# Patient Record
Sex: FEMALE | Race: WHITE | Employment: OTHER | URBAN - METROPOLITAN AREA
[De-identification: names, ages, dates, MRNs, and addresses within clinical notes are randomized per-mention and may not be internally consistent; named-entity substitution may affect disease eponyms.]

---

## 2017-06-08 ENCOUNTER — HOSPITAL ENCOUNTER (OUTPATIENT)
Dept: NON INVASIVE DIAGNOSTICS | Age: 77
Discharge: HOME | End: 2017-06-09
Attending: INTERNAL MEDICINE | Admitting: INTERNAL MEDICINE
Payer: MEDICARE

## 2017-06-08 ENCOUNTER — APPOINTMENT (OUTPATIENT)
Dept: GENERAL RADIOLOGY | Age: 77
End: 2017-06-08
Attending: INTERNAL MEDICINE
Payer: MEDICARE

## 2017-06-08 PROCEDURE — 77030031139 HC SUT VCRL2 J&J -A

## 2017-06-08 PROCEDURE — L3670 SO ACRO/CLAV CAN WEB PRE OTS: HCPCS

## 2017-06-08 PROCEDURE — 77030011640 HC PAD GRND REM COVD -A

## 2017-06-08 PROCEDURE — 77030002996 HC SUT SLK J&J -A

## 2017-06-08 PROCEDURE — 33208 INSRT HEART PM ATRIAL & VENT: CPT

## 2017-06-08 PROCEDURE — 74011000250 HC RX REV CODE- 250

## 2017-06-08 PROCEDURE — 74011250637 HC RX REV CODE- 250/637: Performed by: INTERNAL MEDICINE

## 2017-06-08 PROCEDURE — 77030018729 HC ELECTRD DEFIB PAD CARD -B

## 2017-06-08 PROCEDURE — 71010 XR CHEST PORT: CPT

## 2017-06-08 PROCEDURE — 99153 MOD SED SAME PHYS/QHP EA: CPT

## 2017-06-08 PROCEDURE — C1894 INTRO/SHEATH, NON-LASER: HCPCS

## 2017-06-08 PROCEDURE — 77030010507 HC ADH SKN DERMBND J&J -B

## 2017-06-08 PROCEDURE — 74011250636 HC RX REV CODE- 250/636

## 2017-06-08 PROCEDURE — 77030018836 HC SOL IRR NACL ICUM -A

## 2017-06-08 PROCEDURE — 74011250636 HC RX REV CODE- 250/636: Performed by: INTERNAL MEDICINE

## 2017-06-08 PROCEDURE — C1898 LEAD, PMKR, OTHER THAN TRANS: HCPCS

## 2017-06-08 PROCEDURE — C1892 INTRO/SHEATH,FIXED,PEEL-AWAY: HCPCS

## 2017-06-08 PROCEDURE — C1785 PMKR, DUAL, RATE-RESP: HCPCS

## 2017-06-08 PROCEDURE — 99152 MOD SED SAME PHYS/QHP 5/>YRS: CPT

## 2017-06-08 RX ORDER — ONDANSETRON 2 MG/ML
4 INJECTION INTRAMUSCULAR; INTRAVENOUS
Status: DISCONTINUED | OUTPATIENT
Start: 2017-06-08 | End: 2017-06-09 | Stop reason: HOSPADM

## 2017-06-08 RX ORDER — CEFAZOLIN SODIUM 1 G/3ML
INJECTION, POWDER, FOR SOLUTION INTRAMUSCULAR; INTRAVENOUS
Status: DISCONTINUED
Start: 2017-06-08 | End: 2017-06-09 | Stop reason: HOSPADM

## 2017-06-08 RX ORDER — TRAMADOL HYDROCHLORIDE 50 MG/1
50 TABLET ORAL
Status: DISCONTINUED | OUTPATIENT
Start: 2017-06-08 | End: 2017-06-09 | Stop reason: HOSPADM

## 2017-06-08 RX ORDER — LIDOCAINE HYDROCHLORIDE AND EPINEPHRINE 10; 10 MG/ML; UG/ML
INJECTION, SOLUTION INFILTRATION; PERINEURAL
Status: DISCONTINUED
Start: 2017-06-08 | End: 2017-06-08

## 2017-06-08 RX ORDER — SODIUM CHLORIDE 0.9 % (FLUSH) 0.9 %
5-10 SYRINGE (ML) INJECTION AS NEEDED
Status: DISCONTINUED | OUTPATIENT
Start: 2017-06-08 | End: 2017-06-09 | Stop reason: HOSPADM

## 2017-06-08 RX ORDER — METOPROLOL TARTRATE 5 MG/5ML
INJECTION INTRAVENOUS
Status: COMPLETED
Start: 2017-06-08 | End: 2017-06-08

## 2017-06-08 RX ORDER — PANTOPRAZOLE SODIUM 40 MG/1
40 TABLET, DELAYED RELEASE ORAL DAILY
Status: DISCONTINUED | OUTPATIENT
Start: 2017-06-09 | End: 2017-06-09 | Stop reason: HOSPADM

## 2017-06-08 RX ORDER — BISMUTH SUBSALICYLATE 262 MG
1 TABLET,CHEWABLE ORAL DAILY
COMMUNITY
End: 2022-02-22

## 2017-06-08 RX ORDER — CEFAZOLIN SODIUM IN 0.9 % NACL 2 G/100 ML
2 PLASTIC BAG, INJECTION (ML) INTRAVENOUS ONCE
Status: COMPLETED | OUTPATIENT
Start: 2017-06-08 | End: 2017-06-08

## 2017-06-08 RX ORDER — HEPARIN SODIUM 200 [USP'U]/100ML
INJECTION, SOLUTION INTRAVENOUS
Status: COMPLETED
Start: 2017-06-08 | End: 2017-06-08

## 2017-06-08 RX ORDER — LIDOCAINE HYDROCHLORIDE 10 MG/ML
1-40 INJECTION INFILTRATION; PERINEURAL
Status: DISCONTINUED | OUTPATIENT
Start: 2017-06-08 | End: 2017-06-08 | Stop reason: HOSPADM

## 2017-06-08 RX ORDER — FENTANYL CITRATE 50 UG/ML
INJECTION, SOLUTION INTRAMUSCULAR; INTRAVENOUS
Status: COMPLETED
Start: 2017-06-08 | End: 2017-06-08

## 2017-06-08 RX ORDER — ONDANSETRON 2 MG/ML
4 INJECTION INTRAMUSCULAR; INTRAVENOUS
Status: COMPLETED | OUTPATIENT
Start: 2017-06-08 | End: 2017-06-08

## 2017-06-08 RX ORDER — ONDANSETRON 2 MG/ML
INJECTION INTRAMUSCULAR; INTRAVENOUS
Status: COMPLETED
Start: 2017-06-08 | End: 2017-06-08

## 2017-06-08 RX ORDER — SODIUM CHLORIDE 0.9 % (FLUSH) 0.9 %
5-10 SYRINGE (ML) INJECTION EVERY 8 HOURS
Status: DISCONTINUED | OUTPATIENT
Start: 2017-06-08 | End: 2017-06-09 | Stop reason: HOSPADM

## 2017-06-08 RX ORDER — METOPROLOL TARTRATE 5 MG/5ML
5 INJECTION INTRAVENOUS ONCE
Status: COMPLETED | OUTPATIENT
Start: 2017-06-08 | End: 2017-06-08

## 2017-06-08 RX ORDER — MIDAZOLAM HYDROCHLORIDE 1 MG/ML
1-5 INJECTION, SOLUTION INTRAMUSCULAR; INTRAVENOUS
Status: DISCONTINUED | OUTPATIENT
Start: 2017-06-08 | End: 2017-06-08 | Stop reason: HOSPADM

## 2017-06-08 RX ORDER — MIDAZOLAM HYDROCHLORIDE 1 MG/ML
INJECTION, SOLUTION INTRAMUSCULAR; INTRAVENOUS
Status: COMPLETED
Start: 2017-06-08 | End: 2017-06-08

## 2017-06-08 RX ORDER — ENALAPRIL MALEATE 5 MG/1
10 TABLET ORAL DAILY
Status: DISCONTINUED | OUTPATIENT
Start: 2017-06-08 | End: 2017-06-09 | Stop reason: HOSPADM

## 2017-06-08 RX ORDER — BACITRACIN 50000 [IU]/1
INJECTION, POWDER, FOR SOLUTION INTRAMUSCULAR
Status: COMPLETED
Start: 2017-06-08 | End: 2017-06-08

## 2017-06-08 RX ORDER — HEPARIN SODIUM 200 [USP'U]/100ML
500 INJECTION, SOLUTION INTRAVENOUS ONCE
Status: COMPLETED | OUTPATIENT
Start: 2017-06-08 | End: 2017-06-08

## 2017-06-08 RX ORDER — LIDOCAINE HYDROCHLORIDE AND EPINEPHRINE 10; 10 MG/ML; UG/ML
INJECTION, SOLUTION INFILTRATION; PERINEURAL
Status: COMPLETED
Start: 2017-06-08 | End: 2017-06-08

## 2017-06-08 RX ORDER — BACITRACIN 50000 [IU]/1
50000 INJECTION, POWDER, FOR SOLUTION INTRAMUSCULAR ONCE
Status: COMPLETED | OUTPATIENT
Start: 2017-06-08 | End: 2017-06-08

## 2017-06-08 RX ORDER — ACETAMINOPHEN 325 MG/1
650 TABLET ORAL
Status: DISCONTINUED | OUTPATIENT
Start: 2017-06-08 | End: 2017-06-09 | Stop reason: HOSPADM

## 2017-06-08 RX ORDER — TRAMADOL HYDROCHLORIDE 50 MG/1
50 TABLET ORAL
Qty: 10 TAB | Refills: 0 | Status: ON HOLD | OUTPATIENT
Start: 2017-06-08 | End: 2017-07-01

## 2017-06-08 RX ORDER — FENTANYL CITRATE 50 UG/ML
12.5-5 INJECTION, SOLUTION INTRAMUSCULAR; INTRAVENOUS
Status: DISCONTINUED | OUTPATIENT
Start: 2017-06-08 | End: 2017-06-08 | Stop reason: HOSPADM

## 2017-06-08 RX ORDER — CEFAZOLIN SODIUM IN 0.9 % NACL 2 G/100 ML
2 PLASTIC BAG, INJECTION (ML) INTRAVENOUS EVERY 8 HOURS
Status: COMPLETED | OUTPATIENT
Start: 2017-06-08 | End: 2017-06-09

## 2017-06-08 RX ADMIN — BACITRACIN 50000 UNITS: 5000 INJECTION, POWDER, FOR SOLUTION INTRAMUSCULAR at 14:58

## 2017-06-08 RX ADMIN — TRAMADOL HYDROCHLORIDE 50 MG: 50 TABLET, FILM COATED ORAL at 21:51

## 2017-06-08 RX ADMIN — CEFAZOLIN 2 G: 10 INJECTION, POWDER, FOR SOLUTION INTRAVENOUS; PARENTERAL at 21:34

## 2017-06-08 RX ADMIN — FENTANYL CITRATE 25 MCG: 50 INJECTION, SOLUTION INTRAMUSCULAR; INTRAVENOUS at 14:58

## 2017-06-08 RX ADMIN — FENTANYL CITRATE 50 MCG: 50 INJECTION, SOLUTION INTRAMUSCULAR; INTRAVENOUS at 14:16

## 2017-06-08 RX ADMIN — ENALAPRIL MALEATE 10 MG: 5 TABLET ORAL at 18:14

## 2017-06-08 RX ADMIN — TRAMADOL HYDROCHLORIDE 50 MG: 50 TABLET, FILM COATED ORAL at 17:13

## 2017-06-08 RX ADMIN — MIDAZOLAM HYDROCHLORIDE 2 MG: 1 INJECTION, SOLUTION INTRAMUSCULAR; INTRAVENOUS at 14:16

## 2017-06-08 RX ADMIN — MIDAZOLAM HYDROCHLORIDE 2 MG: 1 INJECTION INTRAMUSCULAR; INTRAVENOUS at 14:16

## 2017-06-08 RX ADMIN — FENTANYL CITRATE 25 MCG: 50 INJECTION, SOLUTION INTRAMUSCULAR; INTRAVENOUS at 14:46

## 2017-06-08 RX ADMIN — BACITRACIN 50000 UNITS: 50000 INJECTION, POWDER, FOR SOLUTION INTRAMUSCULAR at 14:58

## 2017-06-08 RX ADMIN — METOPROLOL TARTRATE 5 MG: 5 INJECTION INTRAVENOUS at 14:57

## 2017-06-08 RX ADMIN — CEFAZOLIN 2 G: 10 INJECTION, POWDER, FOR SOLUTION INTRAVENOUS; PARENTERAL at 14:15

## 2017-06-08 RX ADMIN — MIDAZOLAM HYDROCHLORIDE 2 MG: 1 INJECTION, SOLUTION INTRAMUSCULAR; INTRAVENOUS at 14:50

## 2017-06-08 RX ADMIN — HEPARIN SODIUM 1000 UNITS: 200 INJECTION, SOLUTION INTRAVENOUS at 14:15

## 2017-06-08 RX ADMIN — MIDAZOLAM HYDROCHLORIDE 2 MG: 1 INJECTION INTRAMUSCULAR; INTRAVENOUS at 14:45

## 2017-06-08 RX ADMIN — ONDANSETRON HYDROCHLORIDE 4 MG: 2 INJECTION, SOLUTION INTRAMUSCULAR; INTRAVENOUS at 14:21

## 2017-06-08 RX ADMIN — MIDAZOLAM HYDROCHLORIDE 2 MG: 1 INJECTION, SOLUTION INTRAMUSCULAR; INTRAVENOUS at 14:45

## 2017-06-08 RX ADMIN — MIDAZOLAM HYDROCHLORIDE 2 MG: 1 INJECTION, SOLUTION INTRAMUSCULAR; INTRAVENOUS at 14:33

## 2017-06-08 RX ADMIN — ONDANSETRON 4 MG: 2 INJECTION INTRAMUSCULAR; INTRAVENOUS at 14:21

## 2017-06-08 RX ADMIN — LIDOCAINE HYDROCHLORIDE AND EPINEPHRINE 20 ML: 10; 10 INJECTION, SOLUTION INFILTRATION; PERINEURAL at 14:46

## 2017-06-08 RX ADMIN — ACETAMINOPHEN 650 MG: 325 TABLET, FILM COATED ORAL at 21:51

## 2017-06-08 RX ADMIN — Medication 10 ML: at 21:34

## 2017-06-08 NOTE — PROCEDURES
[address]  [phone]    Patient ID:  Patient: Samantha Hay  MRN: [id number]  Age: 78 y.o.  : 1940  Gender: female  Study Date: 2017    History: This is a female with nonreversible sick sinus syndrome and 2nd degree AV block, here for permanent pacemaker implant. Procedures Performed:  1. DUAL CHAMBER PACEMAKER (81642)    The patient was brought to the EP lab in a postabsorptive state after informed consent had been previously obtained. Continuous electrocardiographic and hemodynamic monitoring was performed. Sedation was performed by the nurse who was in constant attendance and my supervision throughout the procedure. Versed and fentanyl were used, start time 1:53 pm, stop time 2:53 pm.  Using 1% lidocaine with epinephrine, the left chest site was anesthetized. The pocket was formed in the usual fashion and ultimately axillary venous access was obtained using a micropuncture needle. Two safe sheaths were placed. The right ventricular lead was advanced to the RV apex. The right atrial lead was advanced to the RA appendage. Each lead was fixed and tested, performance verified. The leads were anchored to the pocket floor using two 0-silk sutures at each anchor sleeve. The pulse generator was connected to the leads and placed in the pocket after hemostasis was confirmed. Vigorous irrigation with antibiotic solution was performed. A single 0-silk suture was used to anchor the pulse generator to the pocket floor. The pocket was closed using a running 2-0 Vicryl layer x1, followed by a more superficial layer of running 4-0 Vicryl in a subcuticular fashion to close the skin. Final fluoroscopic check revealed adequate redundancy of the leads and the absence of pneumothorax. Dermabond was applied. Preoperative Diagnosis: As above. Postoperative Diagnosis: As above. Procedure:  As above.   Surgeon(s) and Role: Marina Matute MD - Primary   Anesthesia:   MAC. Estimated Blood Loss:  <5 cc. Specimens: * No specimens in log *   Findings:  As below. Complications:  None. SETTINGS:  SJM 2272, [id number], [employer] MRI  RA 3.9, 1.1, 564  RV 8.3, 0.5, 636  DDDR     Recommendations:  After successful dual chamber permanent pacemaker implant to the left chest using transvenous leads, routine follow-up in 2-4 weeks for wound and device check.

## 2017-06-08 NOTE — H&P
[facility]   [place], [address]   HISTORY AND PHYSICAL       Name:  Roslyn Mendieta   MR#:  [id number]   :  1940   Account #:  [de-identified]        Date of Adm:  2017       CHIEF COMPLAINT: Symptomatic bradycardia. HISTORY OF PRESENT ILLNESS: This is a 70-year-old female with a   history of prior supraventricular tachycardia ablation in  by Dr. Harman Cruz, chronic propranolol for symptomatic PVCs, hypertension,   hyperlipidemia, and hypothyroidism. She has been quite been complaining of occasional palpitations which   she describes as skipped beats, but no rapid heart rates. Lately she   has had more fatigue, including having to take afternoon naps, which   she did not do before. She tries to remain active and has cut back on   her propranolol to twice from three times daily. She has had a history of vertigo like   symptoms, but none recently. No syncope, chest pain, or heart failure symptoms otherwise. ALLERGIES:   1. EPINEPHRINE. 2. MOLD EXTRACT. 3. MORPHINE. MEDICATIONS: Please see the list.    REVIEW OF SYSTEMS   Noncontributory except as noted above. FAMILY HISTORY: Noncontributory. SOCIAL HISTORY: Nonsmoker. . Occasional alcohol use. PHYSICAL EXAMINATION   VITAL SIGNS: Blood pressure 199/83, pulse 63, respirations 18,   oxygen saturation 100% on room air, weight 138 pounds (62.6   kilograms). GENERAL: Not diaphoretic, not in acute distress. CARDIAC: Irregular rhythm and bradycardic, no murmur. Palpable   radial pulses bilaterally. EXTREMITIES: No cyanosis or clubbing. NEUROLOGIC: Awake, appropriate, grossly nonfocal. No resting   tremor. TELEMETRY: Sinus rhythm with sinus slowing and 2nd degree AV block Mobitz I are noted. The   overall heart rate is approximately 40 beats per minute, probably less. This is at rest.    IMPRESSION:   1.  Sick sinus syndrome and 2nd degree AV block with bradycardia, out of proportion to current beta blocker use. She is symptomatic. This is a nonreversible condition. 2. As above. RECOMMENDATIONS:   1. Given the potential benefits, risks, and alternatives she agrees to proceed with permanent pacemaker. A dual-chamber device will be   used. MRI compatibility will be sought. MD SHIRA Snyder / Denise.Wali   D:  06/08/2017   15:14   T:  06/08/2017   15:37   Job #:  [id number]

## 2017-06-08 NOTE — IP AVS SNAPSHOT
[address] [facility] 
[phone] Patient: Kaveh Falcon MRN: [id number] JFQ:0/49/2548 You are allergic to the following Allergen Reactions Epinephrine Other (comments) \"severe tachycardia\" Mold Extracts Unknown (comments) Morphine Nausea and Vomiting Recent Documentation Height Weight Breastfeeding? BMI OB Status Smoking Status 1.575 m 62.6 kg No 25.24 kg/m2 Hysterectomy Never Smoker Emergency Contacts Name Discharge Info Relation Home Work Mobile [address] [employer] CAREGIVER [3] Spouse [3] [phone] About your hospitalization You were admitted on:  June 8, 2017 You last received care in the:  [facility] 2 [facility] You were discharged on:  June 9, 2017 Unit phone number:  [phone] Why you were hospitalized Your primary diagnosis was:  Not on File Providers Seen During Your Hospitalizations Provider Role Specialty Primary office phone Regi Beverly MD Attending Provider Cardiology [phone] Your Primary Care Physician (PCP) Primary Care Physician Office Phone Office Fax John Lopez [phone] Follow-up Information Follow up With Details Comments Contact Info Rk Lopez, [address] [facility] [address] 
[phone] Your Appointments Wednesday July 05, 2017 11:00 AM EDT  
[facility] SCREENING SD RESULTS with [facility] 3 [address] ([address]) 200 [facility], [address] 3988 [address] [place] [address]  
[phone] Shower or bathe using soap and water.   Do not use deodorant, powder, perfumes, or lotion the day of your exam.  If your prior mammograms were not performed at [facility] please bring films with you or forward prior images 2 days before your procedure. Check in at registration 15min before your appointment time unless you were instructed to do otherwise. A script is not necessary, but if you have one, please bring it on the day of the mammogram or have it faxed to the department. [facility] [phone] [facility] [place]  [phone] [facility] [address]  [phone] [facility] [phone] [facility] [address] [facility] Jem Kentin [phone] Patient should report to the [facility], located in [address] [place] [address], [address]. Patient should arrive 15 minutes prior to appointment time. Current Discharge Medication List  
  
START taking these medications Dose & Instructions Dispensing Information Comments Morning Noon Evening Bedtime  
 traMADol 50 mg tablet Commonly known as:  ULTRAM  
   
Your last dose was: Your next dose is:    
   
   
 Dose:  50 mg Take 1 Tab by mouth every six (6) hours as needed for Pain. Max Daily Amount: 200 mg. Quantity:  10 Tab Refills:  0 CONTINUE these medications which have NOT CHANGED Dose & Instructions Dispensing Information Comments Morning Noon Evening Bedtime  
 enalapril 10 mg tablet Commonly known as:  Tiny Spray Your last dose was: Your next dose is:    
   
   
 Dose:  10 mg Take 10 mg by mouth daily. Refills:  0  
     
   
   
   
  
 multivitamin tablet Commonly known as:  ONE A DAY Your last dose was: Your next dose is:    
   
   
 Dose:  1 Tab Take 1 Tab by mouth daily. Refills:  0  
     
   
   
   
  
 omeprazole 10 mg capsule Commonly known as:  PRILOSEC Your last dose was: Your next dose is:    
   
   
 Dose:  40 mg Take 40 mg by mouth daily. Refills:  0  
     
   
   
   
  
 SYNTHROID 100 mcg tablet Generic drug:  levothyroxine Your last dose was: Your next dose is:    
   
   
 Dose:  100 mcg Take 100 mcg by mouth Daily (before breakfast). DAILY EXCEPT SUNDAY Refills:  0 Where to Get Your Medications Information on where to get these meds will be given to you by the nurse or doctor. ! Ask your nurse or doctor about these medications  
  traMADol 50 mg tablet Discharge Instructions DISCHARGE INSTRUCTIONS FOR PATIENTS WITH PACEMAKERS You had a dual chamber [employer] pacemaker implanted for a symptomatic slow heart rate problem with Dr. Allan Henderson on 6/8. 
 
1. Remember to call for an appointment in 2-4 weeks [phone] to check healing and implant programming with Dr. Davis Kirkpatrick nurse, Billie Nageotte. 2. Medic Alert Bracelets are available from your pharmacist to wear at all times if you choose to wear one. 3. Carry your ID card for pacemaker with you at all times. This card will be given to you in the hospital or mailed to you. 4. The pacemaker will bulge slightly under your skin. The bulge will decrease in size over the next few weeks. Please notify the doctor's office if you notice any of the following around your site: A.  A bruise that does not go away. B.  Soreness or yellow, green, or brown drainage from the site. C. Any swelling from the site. D. If you have a fever of 100 degrees or higher that lasts for a few days. INCISION CARE 1.  Leave skin glue over your site until it starts to fall off, usually in a few weeks. 2.  You may shower after 3 days as long as your incision isnt submerged or directly sprayed upon until well healed. 3.  For comfort, wear loose fitting clothing. 4.  Report any signs of infection, fever, pain, swelling, redness, oozing, or heat at site especially if these symptoms increase after the first 3 to 4 days. ACTIVITY PRECAUTIONS 1. Avoid rough contact with the implant site. 2. No driving for 14 days.  
3. Avoid lifting your arm over your head, carrying anything on the affected side, or lifting over 10 pounds for 30 days. For the first 2 days only bend your arm at the elbow. 4. Any extreme activity such as golf, weight lifting or exercise biking should be restricted for 60 days. 5. Do not carry objects by holding them against your implant site. 6.  No shooting rifles or any type of gun with the affected shoulder permanently. SPECIAL PRECAUTIONS 1. You should avoid all strong magnetic fields, such as arc welding, large transformers, large motors. 2.  You may not have an MRI which uses a strong magnet to take pictures unless your system is confirmed to be working well and a radiologist gives the OK. 3.  Treatments or surgery that requires diathermy or electrocautery should be discussed with your doctor before scheduled. 4. Avoid radio frequency transmitters, including radar. 5. Advise dentist or other medical personnel you see that you have a pacemaker. 6.  Cell phones and microwave oven use is okay. 7.  If you plan to move or take a trip to a new area, the doctor's office will give you a name of a doctor to contact for any problems. ANTIBIOTIC THERAPY During the first 8 weeks after your pacemaker insertion, you may need antibiotics before any dental work or certain tests or operations. Let the dentist or doctor who is caring for you know that you have had an implanted device. Discharge Orders None Introducing [facility]! Grey Zapata introduces [employer] patient portal. Now you can access parts of your medical record, email your doctor's office, and request medication refills online. 1. In your internet browser, go to [email or web address] 2. Click on the First Time User? Click Here link in the Sign In box. You will see the New Member Sign Up page. 3. Enter your [employer] Access Code exactly as it appears below. You will not need to use this code after youve completed the sign-up process.  If you do not sign up before the expiration date, you must request a new code. · [email or web address] Access Code: [email or web address] Expires: 8/23/2017  2:48 PM 
 
4. Enter the last four digits of your Social Security Number (xxxx) and Date of Birth (mm/dd/yyyy) as indicated and click Submit. You will be taken to the next sign-up page. 5. Create a [email or web address] ID. This will be your [email or web address] login ID and cannot be changed, so think of one that is secure and easy to remember. 6. Create a [email or web address] password. You can change your password at any time. 7. Enter your Password Reset Question and Answer. This can be used at a later time if you forget your password. 8. Enter your e-mail address. You will receive e-mail notification when new information is available in [address] 9. Click Sign Up. You can now view and download portions of your medical record. 10. Click the Download Summary menu link to download a portable copy of your medical information. If you have questions, please visit the Frequently Asked Questions section of the [email or web address] website. Remember, [email or web address] is NOT to be used for urgent needs. For medical emergencies, dial 911. Now available from your iPhone and Android! General Information Please provide this summary of care documentation to your next provider. Patient Signature:  ____________________________________________________________ Date:  ____________________________________________________________  
  
Francy Eastman Provider Signature:  ____________________________________________________________ Date:  ____________________________________________________________

## 2017-06-09 VITALS
WEIGHT: 138 LBS | HEART RATE: 60 BPM | BODY MASS INDEX: 25.4 KG/M2 | SYSTOLIC BLOOD PRESSURE: 164 MMHG | TEMPERATURE: 97.5 F | HEIGHT: 62 IN | OXYGEN SATURATION: 97 % | DIASTOLIC BLOOD PRESSURE: 81 MMHG | RESPIRATION RATE: 18 BRPM

## 2017-06-09 PROCEDURE — 74011250636 HC RX REV CODE- 250/636: Performed by: INTERNAL MEDICINE

## 2017-06-09 PROCEDURE — 74011250637 HC RX REV CODE- 250/637: Performed by: INTERNAL MEDICINE

## 2017-06-09 RX ADMIN — ACETAMINOPHEN 650 MG: 325 TABLET, FILM COATED ORAL at 06:25

## 2017-06-09 RX ADMIN — LEVOTHYROXINE SODIUM 100 MCG: 75 TABLET ORAL at 08:26

## 2017-06-09 RX ADMIN — TRAMADOL HYDROCHLORIDE 50 MG: 50 TABLET, FILM COATED ORAL at 05:19

## 2017-06-09 RX ADMIN — CEFAZOLIN 2 G: 10 INJECTION, POWDER, FOR SOLUTION INTRAVENOUS; PARENTERAL at 05:19

## 2017-06-09 RX ADMIN — Medication 10 ML: at 05:21

## 2017-06-09 RX ADMIN — ACETAMINOPHEN 650 MG: 325 TABLET, FILM COATED ORAL at 02:38

## 2017-06-09 RX ADMIN — PANTOPRAZOLE SODIUM 40 MG: 40 TABLET, DELAYED RELEASE ORAL at 08:26

## 2017-06-09 RX ADMIN — MULTIPLE VITAMINS W/ MINERALS TAB 1 TABLET: TAB at 08:26

## 2017-06-09 RX ADMIN — ENALAPRIL MALEATE 10 MG: 5 TABLET ORAL at 08:26

## 2017-06-09 NOTE — PROGRESS NOTES
POD#1 site and device programming check OK. S/p dual chamber SJM pacemaker. Dermabond intact. No hematoma. Ambulatory and taking oral.      Visit Vitals    /81 (BP 1 Location: Right arm, BP Patient Position: At rest)    Pulse 60    Temp 97.5 °F (36.4 °C)    Resp 18    Ht 5' 2\" (1.575 m)    Wt 62.6 kg (138 lb)    SpO2 97%    Breastfeeding No    BMI 25.24 kg/m2       ND, NAD  Chest site OK. No hematoma. RRR, no rub. Lungs CTAB anteriorly. No unilateral arm edema. Awake, appropriate, neuro grossly nonfocal.      PLAN:  Discharge to home with F/U in the office for wound and device programming check. Tramadol PRN. All questions answered. Patient is aware of signs and sx warranting urgent med F/U or calling 911.

## 2017-06-09 NOTE — CARDIO/PULMONARY
Cardiac Rehab:      Chart reviewed and pt visited. Printed material given and discussed re: pacemakers, pacemaker discharge instructions and the TLC diet. Discussed post pacemaker instructions including: restrictions for the affected arm (no raising the arm above shoulder level, no heavy lifting for 30 days), monitoring for infection, avoiding impacts/pressure to the site, avoiding extreme activities, when to call the doctor, use of cell phones and microwaves and avoiding strong magnetic fields. Pt verbalized understanding.

## 2017-06-09 NOTE — PROGRESS NOTES
1900 - Bedside report from [facility]. LACW sore, just medicated by prior nurse. Sling and ice intact. Dermabond site benign. 2100 - OOB to restroom for HS care, sat on side of bed and wanted to return to bed. Pt reassured soreness at site is normal, rates pain a 2/10 but is very dramatic over incision, site, soreness, what clothes she can and cannot wear, etc.  Dtr constantly offering reassurance to pt. Pt declines ambulation in hallway at this time, wants to wait until morning but has moved around in restroom and room a fair amount thusfar this evening with no adverse effects. Pt is maintaining appropriate L arm precautions. 2200 - Tramadol / Tylenol for LACW soreness 2/10.    2300 - pt restful in bed with no current complaints. 0240 - Tylenol for HA and incisional ache. 0552 - Tramadol for HA and incisional ache. Ambulation in hallway 400 feet without difficulty or complaints. LACW incision is benign. 0630 - tylenol for HA and incisional soreness. 0700 - bedside report to RN.

## 2017-06-09 NOTE — PROGRESS NOTES
Patient ambulated in hallway without difficulty. Dressing CDI. No complaints. Discharge instructions reviewed with patient; to be discharged to home with . Site care instructions reviewed; site(s) CDI. Patient instructed on which medications to continue, which to start. Prescriptions given to patient. Medication info provided and reviewed with patient. Follow-up appointment information given; follow-up appointment to be made by patient. IV and tele box removed. Opportunity for questions provided; all questions answered. All belongings returned. Patient wheeled to front door via wheelchair by volunteer; to be transported home by .

## 2017-06-09 NOTE — DISCHARGE INSTRUCTIONS
DISCHARGE INSTRUCTIONS FOR PATIENTS WITH PACEMAKERS    You had a dual chamber [employer] pacemaker implanted for a symptomatic slow heart rate problem with Dr. Kartik Heck on 6/8.    1. Remember to call for an appointment in 2-4 weeks [phone] to check healing and implant programming with Dr. Cookie Mcintosh nurse, Evan Pressley. 2. Medic Alert Bracelets are available from your pharmacist to wear at all times if you choose to wear one. 3. Carry your ID card for pacemaker with you at all times. This card will be given to you in the hospital or mailed to you. 4. The pacemaker will bulge slightly under your skin. The bulge will decrease in size over the next few weeks. Please notify the doctor's office if you notice any of the following around your site:   A.  A bruise that does not go away. B.  Soreness or yellow, green, or brown drainage from the site. C. Any swelling from the site. D. If you have a fever of 100 degrees or higher that lasts for a few days. INCISION CARE       1.  Leave skin glue over your site until it starts to fall off, usually in a few weeks. 2.  You may shower after 3 days as long as your incision isnt submerged or directly sprayed upon until well healed. 3.  For comfort, wear loose fitting clothing. 4.  Report any signs of infection, fever, pain, swelling, redness, oozing, or heat at site especially if these symptoms increase after the first 3 to 4 days. ACTIVITY PRECAUTIONS     1. Avoid rough contact with the implant site. 2. No driving for 14 days. 3. Avoid lifting your arm over your head, carrying anything on the affected side, or lifting over 10 pounds for 30 days. For the first 2 days only bend your arm at the elbow. 4. Any extreme activity such as golf, weight lifting or exercise biking should be restricted for 60 days. 5. Do not carry objects by holding them against your implant site.    6.  No shooting rifles or any type of gun with the affected shoulder permanently. SPECIAL PRECAUTIONS     1. You should avoid all strong magnetic fields, such as arc welding, large transformers, large motors. 2.  You may not have an MRI which uses a strong magnet to take pictures unless your system is confirmed to be working well and a radiologist gives the OK. 3.  Treatments or surgery that requires diathermy or electrocautery should be discussed with your doctor before scheduled. 4. Avoid radio frequency transmitters, including radar. 5. Advise dentist or other medical personnel you see that you have a pacemaker. 6.  Cell phones and microwave oven use is okay. 7.  If you plan to move or take a trip to a new area, the doctor's office will give you a name of a doctor to contact for any problems. ANTIBIOTIC THERAPY    During the first 8 weeks after your pacemaker insertion, you may need antibiotics before any dental work or certain tests or operations. Let the dentist or doctor who is caring for you know that you have had an implanted device.

## 2017-06-29 ENCOUNTER — HOSPITAL ENCOUNTER (OUTPATIENT)
Dept: GENERAL RADIOLOGY | Age: 77
Discharge: HOME | End: 2017-06-29
Payer: MEDICARE

## 2017-06-29 DIAGNOSIS — I44.1: ICD-10-CM

## 2017-06-29 PROCEDURE — 71020 XR CHEST PA LAT: CPT

## 2017-06-30 ENCOUNTER — HOSPITAL ENCOUNTER (OUTPATIENT)
Dept: NON INVASIVE DIAGNOSTICS | Age: 77
Discharge: HOME | End: 2017-07-01
Attending: INTERNAL MEDICINE | Admitting: INTERNAL MEDICINE
Payer: MEDICARE

## 2017-06-30 ENCOUNTER — APPOINTMENT (OUTPATIENT)
Dept: GENERAL RADIOLOGY | Age: 77
End: 2017-06-30
Attending: INTERNAL MEDICINE
Payer: MEDICARE

## 2017-06-30 PROBLEM — T82.110A PACEMAKER LEAD MALFUNCTION: Status: ACTIVE | Noted: 2017-06-30

## 2017-06-30 PROCEDURE — 71010 XR CHEST PORT: CPT

## 2017-06-30 PROCEDURE — 99152 MOD SED SAME PHYS/QHP 5/>YRS: CPT

## 2017-06-30 PROCEDURE — 77030036615 HC IMPL ENV ANTIBACT MEDT -G

## 2017-06-30 PROCEDURE — 74011000250 HC RX REV CODE- 250

## 2017-06-30 PROCEDURE — 74011250636 HC RX REV CODE- 250/636

## 2017-06-30 PROCEDURE — 74011250636 HC RX REV CODE- 250/636: Performed by: INTERNAL MEDICINE

## 2017-06-30 PROCEDURE — 99218 HC RM OBSERVATION: CPT

## 2017-06-30 PROCEDURE — L3670 SO ACRO/CLAV CAN WEB PRE OTS: HCPCS

## 2017-06-30 PROCEDURE — 77030010507 HC ADH SKN DERMBND J&J -B

## 2017-06-30 PROCEDURE — 77030031139 HC SUT VCRL2 J&J -A

## 2017-06-30 PROCEDURE — 77030018729 HC ELECTRD DEFIB PAD CARD -B

## 2017-06-30 PROCEDURE — 74011250637 HC RX REV CODE- 250/637: Performed by: INTERNAL MEDICINE

## 2017-06-30 PROCEDURE — 77030011640 HC PAD GRND REM COVD -A

## 2017-06-30 PROCEDURE — 33215 REPOSITION PACING-DEFIB LEAD: CPT

## 2017-06-30 PROCEDURE — 77030028698 HC BLD TISS PLSM MEDT -D

## 2017-06-30 PROCEDURE — 99153 MOD SED SAME PHYS/QHP EA: CPT

## 2017-06-30 PROCEDURE — 77030002996 HC SUT SLK J&J -A

## 2017-06-30 PROCEDURE — 77030018836 HC SOL IRR NACL ICUM -A

## 2017-06-30 RX ORDER — SODIUM CHLORIDE 0.9 % (FLUSH) 0.9 %
5-10 SYRINGE (ML) INJECTION AS NEEDED
Status: DISCONTINUED | OUTPATIENT
Start: 2017-06-30 | End: 2017-07-01 | Stop reason: HOSPADM

## 2017-06-30 RX ORDER — MIDAZOLAM HYDROCHLORIDE 1 MG/ML
1-5 INJECTION, SOLUTION INTRAMUSCULAR; INTRAVENOUS
Status: DISCONTINUED | OUTPATIENT
Start: 2017-06-30 | End: 2017-06-30 | Stop reason: HOSPADM

## 2017-06-30 RX ORDER — THERA TABS 400 MCG
1 TAB ORAL DAILY
Status: DISCONTINUED | OUTPATIENT
Start: 2017-07-01 | End: 2017-07-01 | Stop reason: HOSPADM

## 2017-06-30 RX ORDER — METOPROLOL TARTRATE 5 MG/5ML
5 INJECTION INTRAVENOUS ONCE
Status: COMPLETED | OUTPATIENT
Start: 2017-06-30 | End: 2017-06-30

## 2017-06-30 RX ORDER — FENTANYL CITRATE 50 UG/ML
INJECTION, SOLUTION INTRAMUSCULAR; INTRAVENOUS
Status: COMPLETED
Start: 2017-06-30 | End: 2017-06-30

## 2017-06-30 RX ORDER — CEFAZOLIN SODIUM IN 0.9 % NACL 2 G/100 ML
PLASTIC BAG, INJECTION (ML) INTRAVENOUS
Status: COMPLETED
Start: 2017-06-30 | End: 2017-06-30

## 2017-06-30 RX ORDER — ONDANSETRON 2 MG/ML
INJECTION INTRAMUSCULAR; INTRAVENOUS
Status: DISPENSED
Start: 2017-06-30 | End: 2017-07-01

## 2017-06-30 RX ORDER — LABETALOL HYDROCHLORIDE 5 MG/ML
10 INJECTION, SOLUTION INTRAVENOUS
Status: DISCONTINUED | OUTPATIENT
Start: 2017-06-30 | End: 2017-07-01 | Stop reason: HOSPADM

## 2017-06-30 RX ORDER — MIDAZOLAM HYDROCHLORIDE 1 MG/ML
INJECTION, SOLUTION INTRAMUSCULAR; INTRAVENOUS
Status: COMPLETED
Start: 2017-06-30 | End: 2017-06-30

## 2017-06-30 RX ORDER — CEFAZOLIN SODIUM IN 0.9 % NACL 2 G/100 ML
2 PLASTIC BAG, INJECTION (ML) INTRAVENOUS EVERY 8 HOURS
Status: CANCELLED | OUTPATIENT
Start: 2017-06-30 | End: 2017-07-01

## 2017-06-30 RX ORDER — LIDOCAINE HYDROCHLORIDE 10 MG/ML
INJECTION INFILTRATION; PERINEURAL
Status: COMPLETED
Start: 2017-06-30 | End: 2017-06-30

## 2017-06-30 RX ORDER — ENALAPRIL MALEATE 5 MG/1
10 TABLET ORAL DAILY
Status: DISCONTINUED | OUTPATIENT
Start: 2017-06-30 | End: 2017-07-01 | Stop reason: HOSPADM

## 2017-06-30 RX ORDER — SODIUM CHLORIDE 0.9 % (FLUSH) 0.9 %
5-10 SYRINGE (ML) INJECTION EVERY 8 HOURS
Status: DISCONTINUED | OUTPATIENT
Start: 2017-06-30 | End: 2017-07-01 | Stop reason: HOSPADM

## 2017-06-30 RX ORDER — TRAMADOL HYDROCHLORIDE 50 MG/1
50-100 TABLET ORAL
Qty: 20 TAB | Refills: 0 | Status: ON HOLD | OUTPATIENT
Start: 2017-06-30 | End: 2017-08-13 | Stop reason: CLARIF

## 2017-06-30 RX ORDER — ONDANSETRON 2 MG/ML
4 INJECTION INTRAMUSCULAR; INTRAVENOUS ONCE
Status: ACTIVE | OUTPATIENT
Start: 2017-06-30 | End: 2017-07-01

## 2017-06-30 RX ORDER — CEFAZOLIN SODIUM IN 0.9 % NACL 2 G/100 ML
2 PLASTIC BAG, INJECTION (ML) INTRAVENOUS ONCE
Status: COMPLETED | OUTPATIENT
Start: 2017-06-30 | End: 2017-06-30

## 2017-06-30 RX ORDER — HEPARIN SODIUM 200 [USP'U]/100ML
500 INJECTION, SOLUTION INTRAVENOUS ONCE
Status: COMPLETED | OUTPATIENT
Start: 2017-06-30 | End: 2017-06-30

## 2017-06-30 RX ORDER — PANTOPRAZOLE SODIUM 40 MG/1
40 TABLET, DELAYED RELEASE ORAL DAILY
Status: DISCONTINUED | OUTPATIENT
Start: 2017-07-01 | End: 2017-07-01 | Stop reason: HOSPADM

## 2017-06-30 RX ORDER — SODIUM CHLORIDE 9 MG/ML
100 INJECTION, SOLUTION INTRAVENOUS CONTINUOUS
Status: DISCONTINUED | OUTPATIENT
Start: 2017-06-30 | End: 2017-07-01 | Stop reason: HOSPADM

## 2017-06-30 RX ORDER — BACITRACIN 50000 [IU]/1
50000 INJECTION, POWDER, FOR SOLUTION INTRAMUSCULAR ONCE
Status: COMPLETED | OUTPATIENT
Start: 2017-06-30 | End: 2017-06-30

## 2017-06-30 RX ORDER — HEPARIN SODIUM 200 [USP'U]/100ML
INJECTION, SOLUTION INTRAVENOUS
Status: COMPLETED
Start: 2017-06-30 | End: 2017-06-30

## 2017-06-30 RX ORDER — BACITRACIN 50000 [IU]/1
INJECTION, POWDER, FOR SOLUTION INTRAMUSCULAR
Status: COMPLETED
Start: 2017-06-30 | End: 2017-06-30

## 2017-06-30 RX ORDER — METOPROLOL TARTRATE 5 MG/5ML
INJECTION INTRAVENOUS
Status: COMPLETED
Start: 2017-06-30 | End: 2017-06-30

## 2017-06-30 RX ORDER — TRAMADOL HYDROCHLORIDE 50 MG/1
50-100 TABLET ORAL
Status: DISCONTINUED | OUTPATIENT
Start: 2017-06-30 | End: 2017-07-01 | Stop reason: HOSPADM

## 2017-06-30 RX ORDER — LIDOCAINE HYDROCHLORIDE 10 MG/ML
1-20 INJECTION INFILTRATION; PERINEURAL
Status: DISCONTINUED | OUTPATIENT
Start: 2017-06-30 | End: 2017-06-30 | Stop reason: HOSPADM

## 2017-06-30 RX ORDER — LORAZEPAM 0.5 MG/1
0.5 TABLET ORAL
Status: DISCONTINUED | OUTPATIENT
Start: 2017-06-30 | End: 2017-07-01 | Stop reason: HOSPADM

## 2017-06-30 RX ORDER — FENTANYL CITRATE 50 UG/ML
12.5-5 INJECTION, SOLUTION INTRAMUSCULAR; INTRAVENOUS
Status: DISCONTINUED | OUTPATIENT
Start: 2017-06-30 | End: 2017-06-30 | Stop reason: HOSPADM

## 2017-06-30 RX ADMIN — METOPROLOL TARTRATE 5 MG: 5 INJECTION INTRAVENOUS at 17:02

## 2017-06-30 RX ADMIN — CEFAZOLIN 2 G: 10 INJECTION, POWDER, FOR SOLUTION INTRAVENOUS; PARENTERAL at 15:47

## 2017-06-30 RX ADMIN — BACITRACIN 50000 UNITS: 50000 INJECTION, POWDER, FOR SOLUTION INTRAMUSCULAR at 16:43

## 2017-06-30 RX ADMIN — Medication 2 G: at 15:47

## 2017-06-30 RX ADMIN — MIDAZOLAM HYDROCHLORIDE 2 MG: 1 INJECTION INTRAMUSCULAR; INTRAVENOUS at 15:46

## 2017-06-30 RX ADMIN — FENTANYL CITRATE 50 MCG: 50 INJECTION, SOLUTION INTRAMUSCULAR; INTRAVENOUS at 16:20

## 2017-06-30 RX ADMIN — FENTANYL CITRATE 50 MCG: 50 INJECTION, SOLUTION INTRAMUSCULAR; INTRAVENOUS at 15:46

## 2017-06-30 RX ADMIN — MIDAZOLAM HYDROCHLORIDE 2 MG: 1 INJECTION, SOLUTION INTRAMUSCULAR; INTRAVENOUS at 16:14

## 2017-06-30 RX ADMIN — MIDAZOLAM HYDROCHLORIDE 1 MG: 1 INJECTION, SOLUTION INTRAMUSCULAR; INTRAVENOUS at 16:20

## 2017-06-30 RX ADMIN — HEPARIN SODIUM 1000 UNITS: 200 INJECTION, SOLUTION INTRAVENOUS at 15:49

## 2017-06-30 RX ADMIN — SODIUM CHLORIDE 100 ML/HR: 900 INJECTION, SOLUTION INTRAVENOUS at 12:25

## 2017-06-30 RX ADMIN — MIDAZOLAM HYDROCHLORIDE 2 MG: 1 INJECTION, SOLUTION INTRAMUSCULAR; INTRAVENOUS at 15:55

## 2017-06-30 RX ADMIN — TRAMADOL HYDROCHLORIDE 100 MG: 50 TABLET, FILM COATED ORAL at 23:18

## 2017-06-30 RX ADMIN — Medication 10 ML: at 23:18

## 2017-06-30 RX ADMIN — MIDAZOLAM HYDROCHLORIDE 2 MG: 1 INJECTION, SOLUTION INTRAMUSCULAR; INTRAVENOUS at 16:05

## 2017-06-30 RX ADMIN — MIDAZOLAM HYDROCHLORIDE 1 MG: 1 INJECTION, SOLUTION INTRAMUSCULAR; INTRAVENOUS at 16:38

## 2017-06-30 RX ADMIN — Medication 50 MCG: at 16:05

## 2017-06-30 RX ADMIN — LIDOCAINE HYDROCHLORIDE 15 ML: 10 INJECTION, SOLUTION INFILTRATION; PERINEURAL at 16:14

## 2017-06-30 RX ADMIN — FENTANYL CITRATE 50 MCG: 50 INJECTION, SOLUTION INTRAMUSCULAR; INTRAVENOUS at 16:05

## 2017-06-30 RX ADMIN — TRAMADOL HYDROCHLORIDE 50 MG: 50 TABLET, FILM COATED ORAL at 18:38

## 2017-06-30 RX ADMIN — LIDOCAINE HYDROCHLORIDE 15 ML: 10 INJECTION INFILTRATION; PERINEURAL at 16:14

## 2017-06-30 RX ADMIN — Medication 50 MCG: at 15:46

## 2017-06-30 RX ADMIN — MIDAZOLAM HYDROCHLORIDE 2 MG: 1 INJECTION, SOLUTION INTRAMUSCULAR; INTRAVENOUS at 15:46

## 2017-06-30 RX ADMIN — Medication 50 MCG: at 16:20

## 2017-06-30 RX ADMIN — ENALAPRIL MALEATE 10 MG: 5 TABLET ORAL at 18:38

## 2017-06-30 RX ADMIN — MIDAZOLAM HYDROCHLORIDE 2 MG: 1 INJECTION, SOLUTION INTRAMUSCULAR; INTRAVENOUS at 16:11

## 2017-06-30 NOTE — IP AVS SNAPSHOT
[address] [facility] 
[phone] Patient: Bebe Durant MRN: [id number] QDP:3/14/3851 You are allergic to the following Allergen Reactions Epinephrine Other (comments) \"severe tachycardia\" Mold Extracts Unknown (comments) Morphine Nausea and Vomiting Recent Documentation Height Weight BMI OB Status Smoking Status 1.575 m 61.7 kg 24.87 kg/m2 Hysterectomy Never Smoker Emergency Contacts Name Discharge Info Relation Home Work Mobile [address] [employer] CAREGIVER [3] Spouse [3] [phone] About your hospitalization You were admitted on:  June 30, 2017 You last received care in the:  [facility] 2 [facility] You were discharged on:  July 1, 2017 Unit phone number:  [phone] Why you were hospitalized Your primary diagnosis was:  Not on File Your diagnoses also included:  Pacemaker Lead Malfunction Providers Seen During Your Hospitalizations Provider Role Specialty Primary office phone Michelle Rodriguez MD Attending Provider Cardiology [phone] Your Primary Care Physician (PCP) Primary Care Physician Office Phone Office Fax Becca Zapata [phone] Follow-up Information Follow up With Details Comments Contact Info Deisy Duffy, [address] [facility] [address] [phone] Your Appointments Wednesday July 05, 2017 11:00 AM EDT  
[facility] SCREENING SD RESULTS with [facility] PSYCHIATRIC CENTER [facility] 3 [address] ([address]) 200 [facility], [address]48 [address] [place] [address]  
[phone] Shower or bathe using soap and water.   Do not use deodorant, powder, perfumes, or lotion the day of your exam.  If your prior mammograms were not performed at [facility] facility please bring films with you or forward prior images 2 days before your procedure. Check in at registration 15min before your appointment time unless you were instructed to do otherwise. A script is not necessary, but if you have one, please bring it on the day of the mammogram or have it faxed to the department. [facility] [phone] [facility]  [phone] [facility]  [phone] [facility] [phone] [facility] 1155 [facility] [phone] Patient should report to the [facility], located in [address] [place] [address], [address]. Patient should arrive 15 minutes prior to appointment time. Current Discharge Medication List  
  
CONTINUE these medications which have CHANGED Dose & Instructions Dispensing Information Comments Morning Noon Evening Bedtime * traMADol 50 mg tablet Commonly known as:  ULTRAM  
What changed:   
- how much to take 
- reasons to take this Your last dose was: Your next dose is:    
   
   
 Dose:   mg Take 1-2 Tabs by mouth every six (6) hours as needed. Max Daily Amount: 400 mg. Quantity:  20 Tab Refills:  0  
     
   
   
   
  
 * traMADol 50 mg tablet Commonly known as:  ULTRAM  
What changed: You were already taking a medication with the same name, and this prescription was added. Make sure you understand how and when to take each. Your last dose was: Your next dose is:    
   
   
 Dose:  50 mg Take 1 Tab by mouth every six (6) hours as needed for Pain. Max Daily Amount: 200 mg. Quantity:  10 Tab Refills:  0  
     
   
   
   
  
 * Notice: This list has 2 medication(s) that are the same as other medications prescribed for you. Read the directions carefully, and ask your doctor or other care provider to review them with you. CONTINUE these medications which have NOT CHANGED Dose & Instructions Dispensing Information Comments Morning Noon Evening Bedtime  
 enalapril 10 mg tablet Commonly known as:  Larina Dense Your last dose was: Your next dose is:    
   
   
 Dose:  10 mg Take 10 mg by mouth daily. Refills:  0  
     
   
   
   
  
 multivitamin tablet Commonly known as:  ONE A DAY Your last dose was: Your next dose is:    
   
   
 Dose:  1 Tab Take 1 Tab by mouth daily. Refills:  0  
     
   
   
   
  
 omeprazole 10 mg capsule Commonly known as:  PRILOSEC Your last dose was: Your next dose is:    
   
   
 Dose:  40 mg Take 40 mg by mouth daily. Refills:  0  
     
   
   
   
  
 SYNTHROID 100 mcg tablet Generic drug:  levothyroxine Your last dose was: Your next dose is:    
   
   
 Dose:  100 mcg Take 100 mcg by mouth Daily (before breakfast). DAILY EXCEPT SUNDAY Refills:  0 Where to Get Your Medications Information on where to get these meds will be given to you by the nurse or doctor. ! Ask your nurse or doctor about these medications  
  traMADol 50 mg tablet  
 traMADol 50 mg tablet Discharge Instructions DISCHARGE INSTRUCTIONS FOR PATIENTS WITH PACEMAKERS You had both the atrial and ventricular leads repositioned by Dr. Starr Mejia on 6/30 due to intermittent chest discomfort that may have been coming from one of those leads. This was done despite a reassuring programming check and chest X-ray, rarely these will not reveal a problem with lead position or interface with the heart muscle itself. I expect you will be sore at the pacemaker incision area for several days. You will be provided with a prescription for pain medication accordingly. 1. Remember to call for an appointment in 2-4 weeks [phone] to check healing and implant programming with Dr. Delmy Prieto nurse, Shana Hansen. Please call with any questions or concerns. 2. Medic Alert Bracelets are available from your pharmacist to wear at all times if you choose to wear one. 3. Carry your ID card for pacemaker with you at all times. This card will be given to you in the hospital or mailed to you. 4. The pacemaker will bulge slightly under your skin. The bulge will decrease in size over the next few weeks. Please notify the doctor's office if you notice any of the following around your site: A.  A bruise that does not go away. B.  Soreness or yellow, green, or brown drainage from the site. C. Any swelling from the site. D. If you have a fever of 100 degrees or higher that lasts for a few days. INCISION CARE 1.  Leave skin glue over your site until it starts to fall off, usually in a few weeks. 2.  You may shower after 3 days as long as your incision isnt submerged or directly sprayed upon until well healed. No submersion in any water for one month. 3.  For comfort, wear loose fitting clothing. 4.  Report any signs of infection, fever, pain, swelling, redness, oozing, or heat at site especially if these symptoms increase after the first 3 to 4 days. ACTIVITY PRECAUTIONS 1. Avoid rough contact with the implant site. 2. No driving for 14 days. 3. Avoid lifting your arm over your head, carrying anything on the affected side, or lifting over 10 pounds for 30 days. For the first 2 days only bend your arm at the elbow. 4. Any extreme activity such as golf, weight lifting or exercise biking should be restricted for 60 days. 5. Do not carry objects by holding them against your implant site. 6.  No shooting rifles or any type of gun with the affected shoulder permanently. SPECIAL PRECAUTIONS 1. You should avoid all strong magnetic fields, such as arc welding, large transformers, large motors. 2.  You may not have an MRI which uses a strong magnet to take pictures unless a cardiologist AND radiologist give the [place]. 3.  Treatments or surgery that requires diathermy or electrocautery should be discussed with your doctor before scheduled. 4. Avoid radio frequency transmitters, including radar. 5. Advise dentist or other medical personnel you see that you have a pacemaker. 6.  Cell phones and microwave oven use is okay. 7.  If you plan to move or take a trip to a new area, the doctor's office will give you a name of a doctor to contact for any problems. ANTIBIOTIC THERAPY During the first 8 weeks after your pacemaker insertion, you may need antibiotics before any dental work or certain tests or operations. Let the dentist or doctor who is caring for you know that you have had an implanted device. You will not need to take an oral antibiotic after this procedure. As we discussed, a special mesh impregnated with antibiotics was placed in the area--this will be completely absorbed but will bathe the interior of your pacemaker wound with therapy for about a week. Discharge Orders None Introducing [facility] HE[facility]! [facility] introduces [employer] patient portal. Now you can access parts of your medical record, email your doctor's office, and request medication refills online. 1. In your internet browser, go to [email or web address] 2. Click on the First Time User? Click Here link in the Sign In box. You will see the New Member Sign Up page. 3. Enter your [employer] Access Code exactly as it appears below. You will not need to use this code after youve completed the sign-up process. If you do not sign up before the expiration date, you must request a new code. · [employer] Access Code: [email or web address] Expires: 8/23/2017  2:48 PM 
 
4. Enter the last four digits of your Social Security Number (xxxx) and Date of Birth (mm/dd/yyyy) as indicated and click Submit. You will be taken to the next sign-up page. 5. Create a [employer] ID. This will be your [employer] login ID and cannot be changed, so think of one that is secure and easy to remember. 6. Create a [employer] password. You can change your password at any time. 7. Enter your Password Reset Question and Answer. This can be used at a later time if you forget your password. 8. Enter your e-mail address. You will receive e-mail notification when new information is available in [address] 9. Click Sign Up. You can now view and download portions of your medical record. 10. Click the Download Summary menu link to download a portable copy of your medical information. If you have questions, please visit the Frequently Asked Questions section of the [employer] website. Remember, [employer] is NOT to be used for urgent needs. For medical emergencies, dial 911. Now available from your iPhone and Android! General Information Please provide this summary of care documentation to your next provider. Patient Signature:  ____________________________________________________________ Date:  ____________________________________________________________  
  
[facility] Endo Provider Signature:  ____________________________________________________________ Date:  ____________________________________________________________

## 2017-06-30 NOTE — PROCEDURES
[address]  [phone]    Patient ID:  Patient: Clinton Colbert  MRN: [id number]  Age: 78 y.o.  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 It is unclear whether it is the atrial or ventricular lead by device testing and chest X-ray. She is here for lead revision. Procedures Performed:  1. Repositioning of right atrial lead (55800)   2. Repositioning of right ventricular lead (55859)     The patient was brought to the EP lab in a postabsorptive state after informed consent had been previously obtained. Continuous electrocardiographic and hemodynamic monitoring was performed. Sedation was performed by the EP nurse who was in constant attendance throughout the procedure using Versed 12 mg and fentanyl 150 mcg. Start time 14:45, end time 15:51 (66 minutes). The leads were inspected under fluoroscopy and no significant abnormality was found. No lead stood out as a cause of her chest discomfort. Using 1% lidocaine, the left chest site was anesthetized at the preexisting pocket. The pocket was opened in the usual fashion, old debris removed, and the anchor sleeves of the leads and pulse generator were exposed. There were both small arterial and venous tributaries that required clamping and/or electrocautery to achieve hemostasis. The pulse generator was disconnected from the leads and removed from the pocket. Analyzer testing of the leads was unremarkable. Again, no lead stood out as a cause of her chest discomfort, so I decided to reposition both. The right atrial lead was freed from its anchor sleeve by cutting and removing the 0-silk anchor stitches. Then, the fixation screw was retracted after a stylet advanced. The right atrial lead was pulled back into the SVC, then advanced to the RA appendage region. The lead was fixed and tested, performance verified. The right ventricular lead was freed from its anchor sleeve by cutting and removing the 0-silk anchor stitches. The fixation screw was retracted after a stylet advanced. It was pulled back into the right atrium, then advanced to the RVOT and then down to the RV apex. The lead was fixed and tested, performance verified. The leads were anchored to the pocket floor using two 0-silk sutures at each anchor sleeve. The old pulse generator was connected to the leads and placed in a Tyrx antibiotic envelope and then inside the pocket after hemostasis was confirmed. Vigorous irrigation with antibiotic solution was performed. The pocket was closed using a running 2-0 Vicryl layer x1, followed by a more superficial layer of running 4-0 Vicryl in a subcuticular fashion to close the skin. Final fluoroscopic check revealed adequate redundancy of the leads and the absence of pneumothorax. Dermabond was applied. Preoperative Diagnosis: As above. Postoperative Diagnosis: As above. Procedure:  As above. Surgeon(s) and Role:  Keith Dalal MD - Primary   Anesthesia:   MAC. Estimated Blood Loss:  <5 cc. Specimens: * No specimens in log *   Findings:  As below. Complications:  None. SETTINGS:  [facility]272, [id number]  RA 2.0, 0.8, 448  RV 10.4, 0.7, 543  DDDR     Recommendations:  After successful repositioning of both right atrial and right ventricular transvenous leads of the existing dual chamber permanent pacemaker system, routine follow-up in 2-4 weeks for wound and device check.     Signed:  Varun Hollingsworth MD

## 2017-06-30 NOTE — PROGRESS NOTES
Bedside and Verbal shift change report given to Hermann Nelson (oncoming nurse) by Viki Oglesby (offgoing nurse).  Report included the following information SBAR, Kardex, Intake/Output, MAR, Accordion and Recent Results

## 2017-06-30 NOTE — PROGRESS NOTES
I explained the scenario to the patient and family. She has had some lower central chest discomfort that has occurred intermittently with coughing, sneezing, or bending over. This has not worsened over time. Said that she didn't notice it as much due to pocket site soreness, but when that improved, the new sx emerged. She has had device testing as an OP on two occasions since implant, last one yesterday along with a PA, LAT CXR. The left chest pocket looks OK, device is testing very well, and the CXR shows minimal slack in the RV lead, but otherwise unremarkable. I discussed the option of pacemaker lead revision (atrial and/or ventricular) and she agrees to proceed. I may have to reposition or replace the lead(s) based on my findings and to make sure that all possible explanations for sx addressed. She understands this.

## 2017-07-01 VITALS
RESPIRATION RATE: 16 BRPM | DIASTOLIC BLOOD PRESSURE: 77 MMHG | HEART RATE: 60 BPM | BODY MASS INDEX: 25.03 KG/M2 | WEIGHT: 136 LBS | OXYGEN SATURATION: 96 % | TEMPERATURE: 97.6 F | SYSTOLIC BLOOD PRESSURE: 170 MMHG | HEIGHT: 62 IN

## 2017-07-01 PROCEDURE — 99218 HC RM OBSERVATION: CPT

## 2017-07-01 PROCEDURE — 74011250637 HC RX REV CODE- 250/637: Performed by: INTERNAL MEDICINE

## 2017-07-01 RX ORDER — TRAMADOL HYDROCHLORIDE 50 MG/1
50 TABLET ORAL
Qty: 10 TAB | Refills: 0 | Status: SHIPPED | OUTPATIENT
Start: 2017-07-01 | End: 2022-02-22

## 2017-07-01 RX ADMIN — Medication 10 ML: at 05:44

## 2017-07-01 RX ADMIN — Medication 10 ML: at 06:23

## 2017-07-01 RX ADMIN — TRAMADOL HYDROCHLORIDE 50 MG: 50 TABLET, FILM COATED ORAL at 05:44

## 2017-07-01 RX ADMIN — TRAMADOL HYDROCHLORIDE 100 MG: 50 TABLET, FILM COATED ORAL at 11:15

## 2017-07-01 RX ADMIN — ENALAPRIL MALEATE 10 MG: 5 TABLET ORAL at 08:04

## 2017-07-01 RX ADMIN — THERA TABS 1 TABLET: TAB at 08:04

## 2017-07-01 RX ADMIN — PANTOPRAZOLE SODIUM 40 MG: 40 TABLET, DELAYED RELEASE ORAL at 08:04

## 2017-07-01 RX ADMIN — LEVOTHYROXINE SODIUM 100 MCG: 75 TABLET ORAL at 08:04

## 2017-07-01 NOTE — PROGRESS NOTES
Pt c/o abdominal discomfort as well as left anterior chest wall incisional pain. Pt states the incisional l pain makes wayne feel nauseated. Provided pt with crackers ans ginger ale. Tramadol 100 mg po given for pain. Pt up to bathroom x 2. Pt states she feels like she needs to have a BM but is unable to. Pt also states that she takes Metamucil at home daily. Pt stated her last BM was Thursday. Will continue to monitor.

## 2017-07-01 NOTE — CARDIO/PULMONARY
C/P Rehab Note:    Chart Reviewed. Pt S/p PPM lead revision. Pt is a non smoker. History significant for:  -HTN  Met with pt who was sitting up in the bed. Reviewed role of Cardiac Rehab Nurse. Printed material given and discussed re:  pacemaker discharge instructions and the [place] diet. Reviewed post pacemaker instructions including: restrictions for the affected arm (no raising the arm above shoulder level, no heavy lifting for 30 days), monitoring for infection, avoiding impacts/pressure to the site, avoiding extreme activities, when to call the doctor, use of cell phones and microwaves and avoiding strong magnetic fields. Pt without questions and demonstrated understanding.

## 2017-07-01 NOTE — ROUTINE PROCESS
Verbal and bedside report received from Antonieta Nugent RN by [employer]. Resumed care of pt. VSS. Pt c/o of incisional pain 5/10. Incision on left chest dry and intact with no hematoma.

## 2017-07-01 NOTE — DISCHARGE INSTRUCTIONS
DISCHARGE INSTRUCTIONS FOR PATIENTS WITH PACEMAKERS    You had both the atrial and ventricular leads repositioned by Dr. Dee Dill on 6/30 due to intermittent chest discomfort that may have been coming from one of those leads. This was done despite a reassuring programming check and chest X-ray, rarely these will not reveal a problem with lead position or interface with the heart muscle itself. I expect you will be sore at the pacemaker incision area for several days. You will be provided with a prescription for pain medication accordingly. 1. Remember to call for an appointment in 2-4 weeks [phone] to check healing and implant programming with Dr. Andrea Bella nurse, Niki Fagan. Please call with any questions or concerns. 2. Medic Alert Bracelets are available from your pharmacist to wear at all times if you choose to wear one. 3. Carry your ID card for pacemaker with you at all times. This card will be given to you in the hospital or mailed to you. 4. The pacemaker will bulge slightly under your skin. The bulge will decrease in size over the next few weeks. Please notify the doctor's office if you notice any of the following around your site:   A.  A bruise that does not go away. B.  Soreness or yellow, green, or brown drainage from the site. C. Any swelling from the site. D. If you have a fever of 100 degrees or higher that lasts for a few days. INCISION CARE       1.  Leave skin glue over your site until it starts to fall off, usually in a few weeks. 2.  You may shower after 3 days as long as your incision isnt submerged or directly sprayed upon until well healed. No submersion in any water for one month. 3.  For comfort, wear loose fitting clothing. 4.  Report any signs of infection, fever, pain, swelling, redness, oozing, or heat at site especially if these symptoms increase after the first 3 to 4 days. ACTIVITY PRECAUTIONS     1. Avoid rough contact with the implant site.   2. No driving for 14 days. 3. Avoid lifting your arm over your head, carrying anything on the affected side, or lifting over 10 pounds for 30 days. For the first 2 days only bend your arm at the elbow. 4. Any extreme activity such as golf, weight lifting or exercise biking should be restricted for 60 days. 5. Do not carry objects by holding them against your implant site. 6.  No shooting rifles or any type of gun with the affected shoulder permanently. SPECIAL PRECAUTIONS     1. You should avoid all strong magnetic fields, such as arc welding, large transformers, large motors. 2.  You may not have an MRI which uses a strong magnet to take pictures unless a cardiologist AND radiologist give the [place]. 3.  Treatments or surgery that requires diathermy or electrocautery should be discussed with your doctor before scheduled. 4. Avoid radio frequency transmitters, including radar. 5. Advise dentist or other medical personnel you see that you have a pacemaker. 6.  Cell phones and microwave oven use is okay. 7.  If you plan to move or take a trip to a new area, the doctor's office will give you a name of a doctor to contact for any problems. ANTIBIOTIC THERAPY    During the first 8 weeks after your pacemaker insertion, you may need antibiotics before any dental work or certain tests or operations. Let the dentist or doctor who is caring for you know that you have had an implanted device. You will not need to take an oral antibiotic after this procedure. As we discussed, a special mesh impregnated with antibiotics was placed in the area--this will be completely absorbed but will bathe the interior of your pacemaker wound with therapy for about a week.

## 2017-08-10 ENCOUNTER — HOSPITAL ENCOUNTER (INPATIENT)
Age: 77
LOS: 2 days | Discharge: HOME | DRG: 281 | End: 2017-08-13
Attending: EMERGENCY MEDICINE | Admitting: INTERNAL MEDICINE
Payer: MEDICARE

## 2017-08-10 ENCOUNTER — APPOINTMENT (OUTPATIENT)
Dept: GENERAL RADIOLOGY | Age: 77
DRG: 281 | End: 2017-08-10
Attending: EMERGENCY MEDICINE
Payer: MEDICARE

## 2017-08-10 DIAGNOSIS — I51.81 TAKOTSUBO CARDIOMYOPATHY: ICD-10-CM

## 2017-08-10 DIAGNOSIS — I21.4 NSTEMI (NON-ST ELEVATED MYOCARDIAL INFARCTION) (HCC): Primary | ICD-10-CM

## 2017-08-10 LAB
ALBUMIN SERPL BCP-MCNC: 3.3 G/DL (ref 3.5–5)
ALBUMIN/GLOB SERPL: 0.9 {RATIO} (ref 1.1–2.2)
ALP SERPL-CCNC: 90 U/L (ref 45–117)
ALT SERPL-CCNC: 23 U/L (ref 12–78)
ANION GAP BLD CALC-SCNC: 8 MMOL/L (ref 5–15)
AST SERPL W P-5'-P-CCNC: 19 U/L (ref 15–37)
BASOPHILS # BLD AUTO: 0 K/UL (ref 0–0.1)
BASOPHILS # BLD: 0 % (ref 0–1)
BILIRUB SERPL-MCNC: 0.3 MG/DL (ref 0.2–1)
BUN SERPL-MCNC: 16 MG/DL (ref 6–20)
BUN/CREAT SERPL: 17 (ref 12–20)
CALCIUM SERPL-MCNC: 8.4 MG/DL (ref 8.5–10.1)
CHLORIDE SERPL-SCNC: 106 MMOL/L (ref 97–108)
CK SERPL-CCNC: 140 U/L (ref 26–192)
CO2 SERPL-SCNC: 24 MMOL/L (ref 21–32)
CREAT SERPL-MCNC: 0.93 MG/DL (ref 0.55–1.02)
EOSINOPHIL # BLD: 0.1 K/UL (ref 0–0.4)
EOSINOPHIL NFR BLD: 1 % (ref 0–7)
ERYTHROCYTE [DISTWIDTH] IN BLOOD BY AUTOMATED COUNT: 13.8 % (ref 11.5–14.5)
GLOBULIN SER CALC-MCNC: 3.6 G/DL (ref 2–4)
GLUCOSE SERPL-MCNC: 167 MG/DL (ref 65–100)
HCT VFR BLD AUTO: 36.2 % (ref 35–47)
HGB BLD-MCNC: 12.4 G/DL (ref 11.5–16)
LYMPHOCYTES # BLD AUTO: 25 % (ref 12–49)
LYMPHOCYTES # BLD: 2.3 K/UL (ref 0.8–3.5)
MCH RBC QN AUTO: 30.8 PG (ref 26–34)
MCHC RBC AUTO-ENTMCNC: 34.3 G/DL (ref 30–36.5)
MCV RBC AUTO: 90 FL (ref 80–99)
MONOCYTES # BLD: 0.2 K/UL (ref 0–1)
MONOCYTES NFR BLD AUTO: 2 % (ref 5–13)
NEUTS SEG # BLD: 6.4 K/UL (ref 1.8–8)
NEUTS SEG NFR BLD AUTO: 72 % (ref 32–75)
PLATELET # BLD AUTO: 283 K/UL (ref 150–400)
POTASSIUM SERPL-SCNC: 3.1 MMOL/L (ref 3.5–5.1)
PROT SERPL-MCNC: 6.9 G/DL (ref 6.4–8.2)
RBC # BLD AUTO: 4.02 M/UL (ref 3.8–5.2)
SODIUM SERPL-SCNC: 138 MMOL/L (ref 136–145)
TROPONIN I SERPL-MCNC: 2.84 NG/ML
WBC # BLD AUTO: 9 K/UL (ref 3.6–11)

## 2017-08-10 PROCEDURE — 85025 COMPLETE CBC W/AUTO DIFF WBC: CPT | Performed by: EMERGENCY MEDICINE

## 2017-08-10 PROCEDURE — 96375 TX/PRO/DX INJ NEW DRUG ADDON: CPT

## 2017-08-10 PROCEDURE — 71010 XR CHEST PORT: CPT

## 2017-08-10 PROCEDURE — 96374 THER/PROPH/DIAG INJ IV PUSH: CPT

## 2017-08-10 PROCEDURE — 80053 COMPREHEN METABOLIC PANEL: CPT | Performed by: EMERGENCY MEDICINE

## 2017-08-10 PROCEDURE — 82550 ASSAY OF CK (CPK): CPT | Performed by: EMERGENCY MEDICINE

## 2017-08-10 PROCEDURE — 84484 ASSAY OF TROPONIN QUANT: CPT | Performed by: EMERGENCY MEDICINE

## 2017-08-10 PROCEDURE — 99285 EMERGENCY DEPT VISIT HI MDM: CPT

## 2017-08-10 PROCEDURE — 93005 ELECTROCARDIOGRAM TRACING: CPT

## 2017-08-10 PROCEDURE — 36415 COLL VENOUS BLD VENIPUNCTURE: CPT | Performed by: EMERGENCY MEDICINE

## 2017-08-10 NOTE — IP AVS SNAPSHOT
[address] [facility] 
[phone] Patient: Martine Roque MRN: [id number] FIC:[id number]/85/[id number] You are allergic to the following Allergen Reactions Epinephrine Other (comments) \"severe tachycardia\" Mold Extracts Unknown (comments) Morphine Nausea and Vomiting Recent Documentation Height Weight Breastfeeding? 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 [3] Spouse [3] [phone] About your hospitalization You were admitted on:  August 11, 2017 You last received care in the:  [facility] 2 INTRVNTNL CARDIO You were discharged on:  August 13, 2017 Unit phone number:  [phone] Why you were hospitalized Your primary diagnosis was:  Not on File Your diagnoses also included:  Nstemi (Non-St Elevated Myocardial Infarction) (Hcc), Htn (Hypertension), History Of Permanent Cardiac Pacemaker Placement, Essential Hypertension, Benign, Gerd (Gastroesophageal Reflux Disease) Providers Seen During Your Hospitalizations Provider Role Specialty Primary office phone Moisés Posada MD Attending Provider Emergency Medicine [phone] Washington Chin MD Attending Provider Cardiology [phone] Gary Sanches MD Attending Provider Cardiology [phone] Your Primary Care Physician (PCP) Primary Care Physician Office Phone Office Fax Hannahute Speed [phone] Follow-up Information Follow up With Details Comments Contact Info Alyssia Ferraro MD  to discuss long term treatment of anxiety [address] [place] [address] [facility] [address] [phone] Gary Sanches MD Schedule an appointment as soon as possible for a visit in 2 weeks  [id number] [place] 
[phone] Your Appointments Wednesday September 06, 2017  9:00 AM EDT  
CARD ASSESSMENT I with [facility] CARDIOPULM SPECIALTY  
[facility] CARDIOPULM [facility] ([address]) [address] [facility] [place]  
[phone] Wednesday September 27, 2017 10:15 AM EDT  
BETHANY MAMMO SCREENING with [facility] 2 [address] ([address]) [address] [place] [address], [address] [facility]  
[phone] Shower or bathe using soap and water. Do not use deodorant, powder, perfumes, or lotion the day of your exam.  If your prior mammograms were not performed at [facility] 6 please bring films with you or forward prior images 2 days before your procedure. Check in at registration 15min before your appointment time unless you were instructed to do otherwise. A script is not necessary, but if you have one, please bring it on the day of the mammogram or have it faxed to the department. [facility] [phone] [facility]  [phone] [facility] [address]  [phone] [facility] [phone] [facility] 1156 [facility] [phone] Patient should report to the [facility], located in [address] [place] [address], [address]. Patient should arrive 15 minutes prior to appointment time. Current Discharge Medication List  
  
START taking these medications Dose & Instructions Dispensing Information Comments Morning Noon Evening Bedtime  
 aspirin 81 mg chewable tablet Start taking on:  8/14/2017 Your last dose was: Your next dose is:    
   
   
 Dose:  81 mg Take 1 Tab by mouth daily. Quantity:  30 Tab Refills:  12 LORazepam 0.5 mg tablet Commonly known as:  ATIVAN Your last dose was: Your next dose is:    
   
   
 Dose:  0.5 mg Take 1 Tab by mouth every six (6) hours as needed. Max Daily Amount: 2 mg. Do not drive or drink alcohol if taking this medicine. Quantity:  20 Tab Refills:  0  
     
   
   
   
  
 metoprolol succinate 25 mg XL tablet Commonly known as:  TOPROL-XL Start taking on:  8/14/2017 Your last dose was: Your next dose is:    
   
   
 Dose:  25 mg Take 1 Tab by mouth daily. Quantity:  30 Tab Refills:  12  
     
   
   
   
  
 pravastatin 10 mg tablet Commonly known as:  PRAVACHOL Your last dose was: Your next dose is:    
   
   
 Dose:  10 mg Take 1 Tab by mouth nightly. Quantity:  30 Tab Refills:  12 CONTINUE these medications which have NOT CHANGED Dose & Instructions Dispensing Information Comments Morning Noon Evening Bedtime  
 enalapril 10 mg tablet Commonly known as:  Meldanita Hayser Your last dose was: Your next dose is:    
   
   
 Dose:  10 mg Take 10 mg by mouth daily. Refills:  0  
     
   
   
   
  
 multivitamin tablet Commonly known as:  ONE A DAY Your last dose was: Your next dose is:    
   
   
 Dose:  1 Tab Take 1 Tab by mouth daily. Refills:  0  
     
   
   
   
  
 omeprazole 10 mg capsule Commonly known as:  PRILOSEC Your last dose was: Your next dose is:    
   
   
 Dose:  40 mg Take 40 mg by mouth daily. Refills:  0  
     
   
   
   
  
 SYNTHROID 100 mcg tablet Generic drug:  levothyroxine Your last dose was: Your next dose is:    
   
   
 Dose:  100 mcg Take 100 mcg by mouth Daily (before breakfast). DAILY EXCEPT SUNDAY Refills:  0  
     
   
   
   
  
 traMADol 50 mg tablet Commonly known as:  ULTRAM  
   
Your last dose was: Your next dose is:    
   
   
 Dose:  50 mg Take 1 Tab by mouth every six (6) hours as needed for Pain. Max Daily Amount: 200 mg. Quantity:  10 Tab Refills:  0 Where to Get Your Medications Information on where to get these meds will be given to you by the nurse or doctor. ! Ask your nurse or doctor about these medications  
  aspirin 81 mg chewable tablet LORazepam 0.5 mg tablet  
 metoprolol succinate 25 mg XL tablet  
 pravastatin 10 mg tablet Discharge Instructions [address]    [phone] [address]    [email or web address] Patient Discharge Instructions Bebe Durant / [id number] : 1940 Admitted 8/10/2017 Discharged 2017 · It is important that you take the medication exactly as they are prescribed. · Keep your medication in the bottles provided by the pharmacist and keep a list of the medication names, dosages, and times to be taken in your wallet. · Do not take other medications without consulting your doctor. BRING ALL OF YOUR MEDICINES TO YOUR OFFICE VISIT with Dr. Wyatt Flores. Follow-up:  
Follow-up Information Follow up With Details Comments Contact Info Deisy Duffy MD  to discuss long term treatment of anxiety [address] [place], [place] [id number] 
[phone] Sal Fairchild MD Schedule an appointment as soon as possible for a visit in 2 weeks  [address] [facility] 
[phone] Cardiac Catheterization  Discharge Instructions ? Do not drive, operate any machinery, or sign any legal documents for 24 hours after your procedure. You must have someone to drive you home. ? You may take a shower 24 hours after your cardiac catheterization. Be sure to get the dressing wet and then remove it; gently wash the area with warm soapy water. Pat dry and leave open to air. To help prevent infections, be sure to keep the cath site clean and dry. No lotions, creams, powders, ointments, etc. in the cath site for approximately 1 week.  
 
? Do not take a tub bath, get in a hot tub or swimming pool for approximately 5 days or until the cath site is completely healed. ? No strenuous activity or heavy lifting over 10 lbs. for 7 days. ? Drink plenty of fluids for 24-48 hours after your cath to flush the contrast dye from your kidneys. No alcoholic beverages for 24 hours. You may resume your previous diet (low fat, low cholesterol) after your cath. ? After your cath, some bruising or discomfort is common during the healing process. Tylenol, 1-2 tablets every 6 hours as needed, is recommended if you experience any discomfort. If you experience any signs or symptoms of infection such as fever, chills, or poorly healing incision, persistent tenderness or swelling in the groin, redness and/or warmth to the touch, numbness, significant tingling or pain at the groin site or affected extremity, rash, drainage from the cath site, or if the leg feels tight or swollen, call your physician right away. ? If bleeding at the cath site occurs, take a clean gauze pad and apply direct pressure to the groin just above the puncture site. Call 911 immediately, and continue to apply direct pressure until an ambulance gets to your location. ? You may return to work  2  days after your cardiac cath if no groin bleeding. Heart Failure: After Your Visit Your Care Instructions Heart failure occurs when your heart does not pump as much blood as the body needs. Failure does not mean that the heart has stopped pumping but rather that it is not pumping as well as it should. Over time, this causes fluid buildup in your lungs and other parts of your body. Fluid buildup can cause shortness of breath, fatigue, swollen ankles, and other problems. By taking medicines regularly, reducing sodium (salt) in your diet, checking your weight every day, and making lifestyle changes, you can feel better and live longer. Follow-up care is a key part of your treatment and safety.  Be sure to make and go to all appointments, and call your doctor if you are having problems. You need to keep a list of the medicines you take and the medicine dosages. How can you care for yourself at home? Diet · Limit sodium (salt) in your diet to less than 1800 mg per day. People get most of their sodium from table salt that is added to food and from processed foods. Fast food and restaurant meals also tend to be very high in sodium. Do not add salt to your food. · Limit your liquids to  1.5 quarts per day. Medicines · Take your medicines exactly as prescribed. Call your doctor if you think you are having a problem with your medicine. · Do not take any vitamins, over-the-counter medicine, or herbal products without talking to your doctor first. Adline Rim not take ibuprofen (Advil or Motrin) and naproxen (Aleve) without talking to your doctor first. They could make your heart failure worse. · You may be taking some of the following medicine. ¨ Beta-blockers can slow heart rate, decrease blood pressure, and improve your condition. Taking a beta-blocker may lower your chance of needing to be hospitalized again. ¨ Angiotensin-converting enzyme inhibitors (ACEIs) reduce the heart's workload, lower blood pressure, and reduce swelling. Taking an ACEI may lower your chance of needing to be hospitalized again. ¨ Angiotensin II receptor blockers (ARBs) work like ACEIs. Your doctor may prescribe them instead of or in addition to ACEIs. ¨ Diuretics, also called water pills, reduce swelling. ¨ Spironolactone is a diuretic that also keeps heart failure from getting worse. ¨ Digoxin reduces symptoms for some people with heart failure. ¨ Aspirin and other blood thinners prevent blood clots, which can cause a stroke or heart attack. ¨ Potassium supplements replace this important mineral, which is sometimes lost with diuretics. When should you call for help? Call 911 if you have symptoms of sudden heart failure such as: · You have severe trouble breathing. · You cough up pink, foamy mucus. · You have a new irregular or rapid heartbeat. Call your doctor now or seek immediate medical care if: 
· You have new or increased shortness of breath. · You are dizzy or lightheaded, or you feel like you may faint. · You have sudden weight gain, such as 3 pounds or more in 2 to 3 days. · You have increased swelling in your legs, ankles, or feet. · You are suddenly so tired or weak that you cannot do your usual activities. Watch closely for changes in your health, and be sure to contact your doctor if: 
· You develop new symptoms. · Lifestyle changes Do not smoke. Smoking increases your risk of a heart attack. If you need help quitting, talk to your doctor about stop-smoking programs and medicines. These can increase your chances of quitting for good. Eat a heart-healthy diet that is low in cholesterol, saturated fat, and salt, and is full of fruits, vegetables and whole-grains. Eat at least two servings of fish each week. You may get more details about how to eat healthy, but these tips can help you get started. Avoid colds and flu. Get a pneumococcal vaccine shot. If you have had one before, ask your doctor whether you need a second dose. Get a flu shot every fall. If you must be around people with colds or flu, wash your hands often. Watch closely for changes in your health, and be sure to contact your doctor if you have any problem Information obtained by : 
I understand that if any problems occur once I am at home I am to contact my physician. I understand and acknowledge receipt of the instructions indicated above. R.N.'s Signature                                                                  Date/Time Patient or Representative Signature                                                          Date/Time Darrin Lennox, MD 
 
    
[address]    [phone] [place], [address] [facility]    [email or web address] Discharge Orders None [employer] Announcement We are excited to announce that we are making your provider's discharge notes available to you in [employer]. You will see these notes when they are completed and signed by the physician that discharged you from your recent hospital stay. If you have any questions or concerns about any information you see in [employer], please call the Health Information Department where you were seen or reach out to your Primary Care Provider for more information about your plan of care. Introducing [facility]! Emanuel Dickey introduces [employer] patient portal. Now you can access parts of your medical record, email your doctor's office, and request medication refills online. 1. In your internet browser, go to [email or web address] 2. Click on the First Time User? Click Here link in the Sign In box. You will see the New Member Sign Up page. 3. Enter your [employer] Access Code exactly as it appears below. You will not need to use this code after youve completed the sign-up process. If you do not sign up before the expiration date, you must request a new code. · [employer] Access Code: [email or web address] Expires: 8/23/2017  2:48 PM 
 
4. Enter the last four digits of your Social Security Number (xxxx) and Date of Birth (mm/dd/yyyy) as indicated and click Submit. You will be taken to the next sign-up page. 5. Create a [employer] ID. This will be your [employer] login ID and cannot be changed, so think of one that is secure and easy to remember. 6. Create a [employer] password. You can change your password at any time. 7. Enter your Password Reset Question and Answer. This can be used at a later time if you forget your password. 8. Enter your e-mail address. You will receive e-mail notification when new information is available in [address] 9. Click Sign Up. You can now view and download portions of your medical record. 10. Click the Download Summary menu link to download a portable copy of your medical information. If you have questions, please visit the Frequently Asked Questions section of the [employer] website. Remember, [employer] is NOT to be used for urgent needs. For medical emergencies, dial 911. Now available from your iPhone and Android! General Information Please provide this summary of care documentation to your next provider. Patient Signature:  ____________________________________________________________ Date:  ____________________________________________________________  
  
Maricel Francisco Provider Signature:  ____________________________________________________________ Date:  ____________________________________________________________

## 2017-08-11 PROBLEM — I10 HTN (HYPERTENSION): Status: ACTIVE | Noted: 2017-08-11

## 2017-08-11 PROBLEM — K21.9 GERD (GASTROESOPHAGEAL REFLUX DISEASE): Status: ACTIVE | Noted: 2017-08-11

## 2017-08-11 PROBLEM — I21.4 NSTEMI (NON-ST ELEVATED MYOCARDIAL INFARCTION) (HCC): Status: ACTIVE | Noted: 2017-08-11

## 2017-08-11 PROBLEM — Z95.0 HISTORY OF PERMANENT CARDIAC PACEMAKER PLACEMENT: Status: ACTIVE | Noted: 2017-08-11

## 2017-08-11 LAB
ATRIAL RATE: 77 BPM
ATRIAL RATE: 78 BPM
CALCULATED P AXIS, ECG09: 69 DEGREES
CALCULATED P AXIS, ECG09: 70 DEGREES
CALCULATED R AXIS, ECG10: 28 DEGREES
CALCULATED R AXIS, ECG10: 64 DEGREES
CALCULATED T AXIS, ECG11: -14 DEGREES
CALCULATED T AXIS, ECG11: 19 DEGREES
CK MB CFR SERPL CALC: 6.5 % (ref 0–2.5)
CK MB SERPL-MCNC: 14.3 NG/ML (ref 5–25)
CK SERPL-CCNC: 221 U/L (ref 26–192)
DIAGNOSIS, 93000: NORMAL
DIAGNOSIS, 93000: NORMAL
P-R INTERVAL, ECG05: 196 MS
P-R INTERVAL, ECG05: 198 MS
Q-T INTERVAL, ECG07: 400 MS
Q-T INTERVAL, ECG07: 424 MS
QRS DURATION, ECG06: 102 MS
QRS DURATION, ECG06: 94 MS
QTC CALCULATION (BEZET), ECG08: 456 MS
QTC CALCULATION (BEZET), ECG08: 479 MS
TROPONIN I SERPL-MCNC: 7.55 NG/ML
VENTRICULAR RATE, ECG03: 77 BPM
VENTRICULAR RATE, ECG03: 78 BPM

## 2017-08-11 PROCEDURE — 74011250637 HC RX REV CODE- 250/637: Performed by: INTERNAL MEDICINE

## 2017-08-11 PROCEDURE — 74011000250 HC RX REV CODE- 250

## 2017-08-11 PROCEDURE — 65660000000 HC RM CCU STEPDOWN

## 2017-08-11 PROCEDURE — 93005 ELECTROCARDIOGRAM TRACING: CPT

## 2017-08-11 PROCEDURE — 82553 CREATINE MB FRACTION: CPT | Performed by: EMERGENCY MEDICINE

## 2017-08-11 PROCEDURE — 74011250636 HC RX REV CODE- 250/636: Performed by: EMERGENCY MEDICINE

## 2017-08-11 PROCEDURE — 77030029065 HC DRSG HEMO QCLOT ZMED -B

## 2017-08-11 PROCEDURE — 84484 ASSAY OF TROPONIN QUANT: CPT | Performed by: EMERGENCY MEDICINE

## 2017-08-11 PROCEDURE — 77030004550 HC CATH ANGI DX PRF MRTM -B

## 2017-08-11 PROCEDURE — 36415 COLL VENOUS BLD VENIPUNCTURE: CPT | Performed by: EMERGENCY MEDICINE

## 2017-08-11 PROCEDURE — 74011250636 HC RX REV CODE- 250/636: Performed by: INTERNAL MEDICINE

## 2017-08-11 PROCEDURE — 82550 ASSAY OF CK (CPK): CPT | Performed by: EMERGENCY MEDICINE

## 2017-08-11 PROCEDURE — B2151ZZ FLUOROSCOPY OF LEFT HEART USING LOW OSMOLAR CONTRAST: ICD-10-PCS | Performed by: INTERNAL MEDICINE

## 2017-08-11 PROCEDURE — 74011250637 HC RX REV CODE- 250/637: Performed by: EMERGENCY MEDICINE

## 2017-08-11 PROCEDURE — B2111ZZ FLUOROSCOPY OF MULTIPLE CORONARY ARTERIES USING LOW OSMOLAR CONTRAST: ICD-10-PCS | Performed by: INTERNAL MEDICINE

## 2017-08-11 PROCEDURE — 74011636320 HC RX REV CODE- 636/320

## 2017-08-11 PROCEDURE — 74011250636 HC RX REV CODE- 250/636

## 2017-08-11 PROCEDURE — C1894 INTRO/SHEATH, NON-LASER: HCPCS

## 2017-08-11 PROCEDURE — C1769 GUIDE WIRE: HCPCS

## 2017-08-11 PROCEDURE — C9113 INJ PANTOPRAZOLE SODIUM, VIA: HCPCS | Performed by: EMERGENCY MEDICINE

## 2017-08-11 PROCEDURE — C1760 CLOSURE DEV, VASC: HCPCS

## 2017-08-11 PROCEDURE — 4A023N7 MEASUREMENT OF CARDIAC SAMPLING AND PRESSURE, LEFT HEART, PERCUTANEOUS APPROACH: ICD-10-PCS | Performed by: INTERNAL MEDICINE

## 2017-08-11 PROCEDURE — 93306 TTE W/DOPPLER COMPLETE: CPT

## 2017-08-11 PROCEDURE — 99153 MOD SED SAME PHYS/QHP EA: CPT

## 2017-08-11 PROCEDURE — 99152 MOD SED SAME PHYS/QHP 5/>YRS: CPT

## 2017-08-11 RX ORDER — SODIUM CHLORIDE 0.9 % (FLUSH) 0.9 %
5-10 SYRINGE (ML) INJECTION AS NEEDED
Status: DISCONTINUED | OUTPATIENT
Start: 2017-08-11 | End: 2017-08-13 | Stop reason: HOSPADM

## 2017-08-11 RX ORDER — ENOXAPARIN SODIUM 100 MG/ML
1 INJECTION SUBCUTANEOUS EVERY 12 HOURS
Status: DISCONTINUED | OUTPATIENT
Start: 2017-08-11 | End: 2017-08-11

## 2017-08-11 RX ORDER — ACETAMINOPHEN 325 MG/1
650 TABLET ORAL
Status: COMPLETED | OUTPATIENT
Start: 2017-08-11 | End: 2017-08-11

## 2017-08-11 RX ORDER — ATORVASTATIN CALCIUM 20 MG/1
20 TABLET, FILM COATED ORAL
Status: DISCONTINUED | OUTPATIENT
Start: 2017-08-11 | End: 2017-08-13

## 2017-08-11 RX ORDER — MORPHINE SULFATE 4 MG/ML
2 INJECTION, SOLUTION INTRAMUSCULAR; INTRAVENOUS
Status: DISCONTINUED | OUTPATIENT
Start: 2017-08-11 | End: 2017-08-13 | Stop reason: HOSPADM

## 2017-08-11 RX ORDER — ACETAMINOPHEN 325 MG/1
650 TABLET ORAL
Status: DISCONTINUED | OUTPATIENT
Start: 2017-08-11 | End: 2017-08-13 | Stop reason: HOSPADM

## 2017-08-11 RX ORDER — SODIUM CHLORIDE 0.9 % (FLUSH) 0.9 %
5-10 SYRINGE (ML) INJECTION EVERY 8 HOURS
Status: DISCONTINUED | OUTPATIENT
Start: 2017-08-11 | End: 2017-08-13 | Stop reason: HOSPADM

## 2017-08-11 RX ORDER — SODIUM CHLORIDE 9 MG/ML
75 INJECTION, SOLUTION INTRAVENOUS CONTINUOUS
Status: DISCONTINUED | OUTPATIENT
Start: 2017-08-11 | End: 2017-08-11

## 2017-08-11 RX ORDER — VALSARTAN 40 MG/1
20 TABLET ORAL DAILY
Status: DISCONTINUED | OUTPATIENT
Start: 2017-08-12 | End: 2017-08-11

## 2017-08-11 RX ORDER — HEPARIN SODIUM 200 [USP'U]/100ML
500 INJECTION, SOLUTION INTRAVENOUS ONCE
Status: COMPLETED | OUTPATIENT
Start: 2017-08-11 | End: 2017-08-11

## 2017-08-11 RX ORDER — PANTOPRAZOLE SODIUM 40 MG/10ML
40 INJECTION, POWDER, LYOPHILIZED, FOR SOLUTION INTRAVENOUS
Status: COMPLETED | OUTPATIENT
Start: 2017-08-11 | End: 2017-08-11

## 2017-08-11 RX ORDER — TRAMADOL HYDROCHLORIDE 50 MG/1
50 TABLET ORAL
Status: DISCONTINUED | OUTPATIENT
Start: 2017-08-11 | End: 2017-08-13 | Stop reason: HOSPADM

## 2017-08-11 RX ORDER — LIDOCAINE HYDROCHLORIDE 10 MG/ML
INJECTION INFILTRATION; PERINEURAL
Status: COMPLETED
Start: 2017-08-11 | End: 2017-08-11

## 2017-08-11 RX ORDER — LORAZEPAM 2 MG/ML
1 INJECTION INTRAMUSCULAR
Status: COMPLETED | OUTPATIENT
Start: 2017-08-11 | End: 2017-08-11

## 2017-08-11 RX ORDER — LIDOCAINE HYDROCHLORIDE 10 MG/ML
1-20 INJECTION INFILTRATION; PERINEURAL ONCE
Status: COMPLETED | OUTPATIENT
Start: 2017-08-11 | End: 2017-08-11

## 2017-08-11 RX ORDER — POTASSIUM CHLORIDE AND SODIUM CHLORIDE 900; 300 MG/100ML; MG/100ML
INJECTION, SOLUTION INTRAVENOUS CONTINUOUS
Status: DISCONTINUED | OUTPATIENT
Start: 2017-08-11 | End: 2017-08-12

## 2017-08-11 RX ORDER — POTASSIUM CHLORIDE 750 MG/1
40 TABLET, FILM COATED, EXTENDED RELEASE ORAL
Status: COMPLETED | OUTPATIENT
Start: 2017-08-11 | End: 2017-08-11

## 2017-08-11 RX ORDER — METOPROLOL SUCCINATE 25 MG/1
12.5 TABLET, EXTENDED RELEASE ORAL DAILY
Status: DISCONTINUED | OUTPATIENT
Start: 2017-08-11 | End: 2017-08-13

## 2017-08-11 RX ORDER — ENALAPRIL MALEATE 5 MG/1
10 TABLET ORAL DAILY
Status: DISCONTINUED | OUTPATIENT
Start: 2017-08-12 | End: 2017-08-13 | Stop reason: HOSPADM

## 2017-08-11 RX ORDER — LEVOTHYROXINE SODIUM 25 UG/1
100 TABLET ORAL
Status: DISCONTINUED | OUTPATIENT
Start: 2017-08-11 | End: 2017-08-13

## 2017-08-11 RX ORDER — NALOXONE HYDROCHLORIDE 0.4 MG/ML
0.4 INJECTION, SOLUTION INTRAMUSCULAR; INTRAVENOUS; SUBCUTANEOUS AS NEEDED
Status: DISCONTINUED | OUTPATIENT
Start: 2017-08-11 | End: 2017-08-13 | Stop reason: HOSPADM

## 2017-08-11 RX ORDER — HEPARIN SODIUM 200 [USP'U]/100ML
INJECTION, SOLUTION INTRAVENOUS
Status: COMPLETED
Start: 2017-08-11 | End: 2017-08-11

## 2017-08-11 RX ORDER — HYDROMORPHONE HYDROCHLORIDE 1 MG/ML
0.5 INJECTION, SOLUTION INTRAMUSCULAR; INTRAVENOUS; SUBCUTANEOUS
Status: COMPLETED | OUTPATIENT
Start: 2017-08-11 | End: 2017-08-11

## 2017-08-11 RX ORDER — METOPROLOL TARTRATE 25 MG/1
25 TABLET, FILM COATED ORAL 2 TIMES DAILY
Status: DISCONTINUED | OUTPATIENT
Start: 2017-08-11 | End: 2017-08-11

## 2017-08-11 RX ORDER — NITROGLYCERIN 0.4 MG/1
0.4 TABLET SUBLINGUAL AS NEEDED
Status: DISCONTINUED | OUTPATIENT
Start: 2017-08-11 | End: 2017-08-13 | Stop reason: HOSPADM

## 2017-08-11 RX ORDER — MIDAZOLAM HYDROCHLORIDE 1 MG/ML
INJECTION, SOLUTION INTRAMUSCULAR; INTRAVENOUS
Status: COMPLETED
Start: 2017-08-11 | End: 2017-08-11

## 2017-08-11 RX ORDER — FENTANYL CITRATE 50 UG/ML
25-50 INJECTION, SOLUTION INTRAMUSCULAR; INTRAVENOUS
Status: DISCONTINUED | OUTPATIENT
Start: 2017-08-11 | End: 2017-08-11

## 2017-08-11 RX ORDER — DIPHENHYDRAMINE HYDROCHLORIDE 50 MG/ML
25 INJECTION, SOLUTION INTRAMUSCULAR; INTRAVENOUS
Status: DISCONTINUED | OUTPATIENT
Start: 2017-08-11 | End: 2017-08-13 | Stop reason: HOSPADM

## 2017-08-11 RX ORDER — POTASSIUM CHLORIDE AND SODIUM CHLORIDE 900; 300 MG/100ML; MG/100ML
INJECTION, SOLUTION INTRAVENOUS CONTINUOUS
Status: DISCONTINUED | OUTPATIENT
Start: 2017-08-11 | End: 2017-08-11

## 2017-08-11 RX ORDER — FENTANYL CITRATE 50 UG/ML
INJECTION, SOLUTION INTRAMUSCULAR; INTRAVENOUS
Status: COMPLETED
Start: 2017-08-11 | End: 2017-08-11

## 2017-08-11 RX ORDER — ASPIRIN 325 MG
325 TABLET ORAL DAILY
Status: DISCONTINUED | OUTPATIENT
Start: 2017-08-11 | End: 2017-08-13

## 2017-08-11 RX ORDER — PANTOPRAZOLE SODIUM 40 MG/1
40 TABLET, DELAYED RELEASE ORAL
Status: DISCONTINUED | OUTPATIENT
Start: 2017-08-11 | End: 2017-08-13 | Stop reason: HOSPADM

## 2017-08-11 RX ORDER — MIDAZOLAM HYDROCHLORIDE 1 MG/ML
.5-2 INJECTION, SOLUTION INTRAMUSCULAR; INTRAVENOUS
Status: DISCONTINUED | OUTPATIENT
Start: 2017-08-11 | End: 2017-08-11

## 2017-08-11 RX ORDER — ONDANSETRON 2 MG/ML
4 INJECTION INTRAMUSCULAR; INTRAVENOUS
Status: DISCONTINUED | OUTPATIENT
Start: 2017-08-11 | End: 2017-08-13 | Stop reason: HOSPADM

## 2017-08-11 RX ADMIN — ATORVASTATIN CALCIUM 20 MG: 20 TABLET, FILM COATED ORAL at 22:00

## 2017-08-11 RX ADMIN — MIDAZOLAM HYDROCHLORIDE 1 MG: 1 INJECTION, SOLUTION INTRAMUSCULAR; INTRAVENOUS at 13:27

## 2017-08-11 RX ADMIN — ACETAMINOPHEN 650 MG: 325 TABLET ORAL at 03:28

## 2017-08-11 RX ADMIN — ACETAMINOPHEN 650 MG: 325 TABLET ORAL at 18:42

## 2017-08-11 RX ADMIN — MIDAZOLAM HYDROCHLORIDE 1 MG: 1 INJECTION INTRAMUSCULAR; INTRAVENOUS at 13:16

## 2017-08-11 RX ADMIN — PANTOPRAZOLE SODIUM 40 MG: 40 TABLET, DELAYED RELEASE ORAL at 09:00

## 2017-08-11 RX ADMIN — SODIUM CHLORIDE 75 ML/HR: 900 INJECTION, SOLUTION INTRAVENOUS at 06:40

## 2017-08-11 RX ADMIN — SODIUM CHLORIDE AND POTASSIUM CHLORIDE: 9; 2.98 INJECTION, SOLUTION INTRAVENOUS at 10:15

## 2017-08-11 RX ADMIN — FENTANYL CITRATE 50 MCG: 50 INJECTION, SOLUTION INTRAMUSCULAR; INTRAVENOUS at 13:16

## 2017-08-11 RX ADMIN — IOPAMIDOL 50 ML: 755 INJECTION, SOLUTION INTRAVENOUS at 13:42

## 2017-08-11 RX ADMIN — HEPARIN SODIUM 1000 UNITS: 200 INJECTION, SOLUTION INTRAVENOUS at 13:26

## 2017-08-11 RX ADMIN — IOPAMIDOL 30 ML: 755 INJECTION, SOLUTION INTRAVENOUS at 13:40

## 2017-08-11 RX ADMIN — Medication 10 ML: at 20:59

## 2017-08-11 RX ADMIN — METOPROLOL TARTRATE 25 MG: 25 TABLET ORAL at 09:00

## 2017-08-11 RX ADMIN — LIDOCAINE HYDROCHLORIDE 8 ML: 10 INJECTION, SOLUTION INFILTRATION; PERINEURAL at 13:28

## 2017-08-11 RX ADMIN — LEVOTHYROXINE SODIUM 100 MCG: 25 TABLET ORAL at 09:00

## 2017-08-11 RX ADMIN — HYDROMORPHONE HYDROCHLORIDE 0.5 MG: 1 INJECTION, SOLUTION INTRAMUSCULAR; INTRAVENOUS; SUBCUTANEOUS at 00:11

## 2017-08-11 RX ADMIN — PANTOPRAZOLE SODIUM 40 MG: 40 INJECTION, POWDER, FOR SOLUTION INTRAVENOUS at 00:11

## 2017-08-11 RX ADMIN — SODIUM CHLORIDE AND POTASSIUM CHLORIDE: 9; 2.98 INJECTION, SOLUTION INTRAVENOUS at 13:59

## 2017-08-11 RX ADMIN — ASPIRIN 325 MG ORAL TABLET 325 MG: 325 PILL ORAL at 05:17

## 2017-08-11 RX ADMIN — MIDAZOLAM HYDROCHLORIDE 1 MG: 1 INJECTION, SOLUTION INTRAMUSCULAR; INTRAVENOUS at 13:16

## 2017-08-11 RX ADMIN — ONDANSETRON 4 MG: 2 INJECTION INTRAMUSCULAR; INTRAVENOUS at 14:08

## 2017-08-11 RX ADMIN — METOPROLOL SUCCINATE 12.5 MG: 25 TABLET, EXTENDED RELEASE ORAL at 17:30

## 2017-08-11 RX ADMIN — LIDOCAINE HYDROCHLORIDE 8 ML: 10 INJECTION INFILTRATION; PERINEURAL at 13:28

## 2017-08-11 RX ADMIN — POTASSIUM CHLORIDE 40 MEQ: 750 TABLET, FILM COATED, EXTENDED RELEASE ORAL at 09:00

## 2017-08-11 RX ADMIN — LORAZEPAM 1 MG: 2 INJECTION INTRAMUSCULAR; INTRAVENOUS at 04:00

## 2017-08-11 RX ADMIN — Medication 10 ML: at 21:42

## 2017-08-11 NOTE — PROCEDURES
Procedure: Cardiac Catheterization. Date: 8/11/2017   Moderate sedation start time: 1316  Moderate sedation stop time: 1338  Sedation used: versed/fentanyl. Sedation was administered by a cath lab nurse; I directly supervised the cath lab staff as the patient was monitored for the duration of the procedure. INDICATION/PreDx: NQWMI. CM (moderate LV dysfunction on echo). PROCEDURES PERFORMED   1. Left heart catheterization. 2. Left ventriculography in JOSEPH projection. 3. Selective coronary angiography. DESCRIPTION OF PROCEDURE: After informed consent of all potential complications, the patient was prepped and draped in the usual sterile manner. Lidocaine 1% was utilized for local anesthesia. Conscious sedation per flow sheet. The right femoral artery was entered and a 6-[place]izen of [place] sheath placed without complication using modified Seldinger technique. The sheath was flushed at all catheter exchanges and all catheters were advanced over a guidewire under direct fluoroscopic visualization. Left coronary angiography was performed in multiple views using a 6-Citizen of [place] JL4 catheter. Right coronary angiography was performed in multiple views using a JR4 catheter. Left heart catheterization and left ventriculography was performed in JOSEPH projection using a 6-[place]izen of [place] straight pigtail. No apparent complications. Estimated blood loss minimal. SPECIMENS: No specimens. After appropriate sheath placement confirmed on angiography and re-prepping the site, an Angioseal was utilized for closure with appropriate deployment, excellent hemostasis and no apparent complications. FINDINGS   LEFT MAIN:   Large vessel; Angiographically normal.    LAD: Large vessel; Angiographically normal. Several small diagonals. CIRCUMFLEX: Large vessel; Ostial 20% stenosis otherwise an angiographically normal vessel. Small mid and distal OMs. RCA: Medium sized, dominant vessel;  Angiographically normal. Small PDA and trivial postero-lateral vessels. LV Gram: Akinetic mid-distal anterior, apical and distal inferior walls with vigorous basal function; EF 30%. Trivial   mitral regurgitation; no aortic stenosis on pullback. EDP 21. CONCLUSION/post procedure Dx:   1. Moderate LV function consistent with Takotsubo CM. 2. No significant CAD. PLAN: Medical management of non-ischemic CM.

## 2017-08-11 NOTE — ROUTINE PROCESS
TRANSFER - OUT REPORT:    Verbal report given to Sindy RN(name) on Martine Roque  being transferred to IVCU(unit) for routine progression of care       Report consisted of patients Situation, Background, Assessment and   Recommendations(SBAR). Information from the following report(s) SBAR, Kardex and ED Summary was reviewed with the receiving nurse. Lines:   Peripheral IV 08/10/17 Right Antecubital (Active)   Site Assessment Clean, dry, & intact 8/10/2017 10:52 PM   Phlebitis Assessment 0 8/10/2017 10:52 PM   Infiltration Assessment 0 8/10/2017 10:52 PM   Dressing Status Clean, dry, & intact 8/10/2017 10:52 PM   Hub Color/Line Status Blue 8/10/2017 10:52 PM        Opportunity for questions and clarification was provided.       Patient transported with:   Registered Nurse  Tech

## 2017-08-11 NOTE — PROGRESS NOTES
Cardiology f/u:   No further CP today; NQWMI by enzymes. Echo: Ef 35-40%; ant/apical WMA; Mild-mod MR. I have recommended diagnostic cath for definitive evaluation of the patient's coronary anatomy and PCI if appropriate; All risks, benefits, and alternatives were discussed and patient wishes to proceed.

## 2017-08-11 NOTE — PROGRESS NOTES
TRANSFER - IN REPORT:    Verbal report received from lionel RN(name) on Ronda Shukla  being received from ER(unit) for routine progression of care      Report consisted of patients Situation, Background, Assessment and   Recommendations(SBAR). Information from the following report(s) SBAR, Kardex, ED Summary, Intake/Output, MAR, Recent Results, Med Rec Status and Cardiac Rhythm paced was reviewed with the receiving nurse. Opportunity for questions and clarification was provided. Assessment will be completed upon patients arrival to unit and care assumed.

## 2017-08-11 NOTE — CONSULTS
[address]   [address] [facility]       Name:  Briana Weinstein   MR#:  [id number]   :  1940   Account #:  [de-identified]    Date of Consultation:  2017   Date of Adm:  08/10/2017       REQUESTING PHYSICIAN: Melanie Rinne, MD, [facility]    CHIEF COMPLAINT: Chest pain. HISTORY OF PRESENT ILLNESS: The patient is a 68-year-old   female with a history of a recent dual-chamber pacemaker placement   for sick sinus syndrome, initially back in . The patient underwent   lead revision in early July by Dr. Rosales Bello because of persistent   symptoms of chest pain and possible lead dislodgement. She tolerated   this well and went home about a month ago. She has no history of   coronary artery disease or prior myocardial infarction. She does have   hypertension and dyslipidemia. She had a remote SV[place] [facility] The patient's  developed severe bleeding from a varicose vein   last night. There was apparently a lot of blood and the patient seemed   quite frightened and upset. She developed chest discomfort, which   was not particularly severe. She eventually came into the ER, where   her initial troponin was 2.84. At that point she was chest pain-free, and   a repeat troponin was 7.85. She was admitted by the hospitalist   service. She is currently clinically stable. PAST MEDICAL HISTORY: As noted above. Also, remarkable for   hypothyroidism. PAST SURGICAL HISTORY: Prior pacemaker placement, prior C-  section x3, prior finger surgery. CURRENT MEDICATIONS   1. Aspirin. 2. Metoprolol. 3. L-thyroxine. SOCIAL HISTORY: The patient does not smoke. She drinks alcohol   socially. She lives locally. FAMILY HISTORY: Negative for heart attacks. REVIEW OF SYSTEMS: As noted above, otherwise noncontributory. PHYSICAL EXAMINATION   GENERAL: Reveals an elderly white female in no acute distress.    VITAL SIGNS: Blood pressure 122/55, pulse 77, respirations 16,   temperature 97.5. HEENT: Pupils are equal and reactive. Oropharynx reveals moist oral   mucosa. NECK: Supple. No masses or thyromegaly. No cervical or   supraclavicular adenopathy. No carotid bruits. No JVD. CHEST: Clear. No wheezes or crackles. SKIN: Warm and dry. CARDIAC: Regular rate and rhythm. Soft gallop, 2/6 systolic murmur. ABDOMEN: Soft, nontender, no masses or organomegaly. Bowel   sounds positive. EXTREMITIES: No cyanosis, clubbing or edema. NEUROLOGIC: No obvious gross motor deficits. LABORATORY DATA: Hemoglobin 12.4. BUN 16, creatinine 0.9,   potassium 3.1. Troponin 2.84, repeat 7.85. DIAGNOSTIC DATA: EKG: Ventricular pacing, with some anterior T-  wave inversions and ST changes seen. Chest x-ray negative. IMPRESSION   1. Non-ST segment elevation myocardial infarction, rule out Takotsubo   cardiomyopathy. 2. Prior dual-chamber pacemaker placement in June, with lead revision   in early July. 3. Hypertension. 4. Dyslipidemia. 5. Remote history of supraventricular tachycardia, with ablation in   1992. PLAN: Continue current medications. We will obtain an   echocardiogram this morning. Plan catheter this afternoon. Hold off on   further anticoagulation, unless she has further symptoms.         MD Pinky Dempsey / Tayler Cardona   D:  08/11/2017   09:24   T:  08/11/2017   09:53   Job #:  [id number]

## 2017-08-11 NOTE — CARDIO/PULMONARY
CP Rehab Note: chart review    Admit: chest pain    Mhx: pacemaker, HTN, dyslipidemia, ablation,     Never smoked. To have cardiac cath today. Echo ordered. CP Rehab will continue to follow.

## 2017-08-11 NOTE — PROGRESS NOTES
3595 - Arrived via stretcher. Calm, no pain.      0700 - Pharmacy not yet sent lovenox. MD paged, lovenox to be HELD. Order for K supplement for 3.1K. Bedside report to RN.

## 2017-08-11 NOTE — PROGRESS NOTES
Bedside and Verbal shift change report given to Jazz (oncoming nurse) by Blair Louis (offgoing nurse). Report included the following information SBAR, Kardex, Intake/Output, MAR, Accordion and Recent Results.

## 2017-08-11 NOTE — ED PROVIDER NOTES
HPI Comments: Chapin Winters, 78 y.o. Female with PMHx significant for HTN and arrhythmia with pacemaker placed, presents via EMS to [facility] ED with cc of sudden onset central chest pain that started after she ran up the stairs at 2130 tonight. She also c/o associated nausea. She states that her grandson called her upstairs because her  had a varicose vein rupture. She states that she saw blood everywhere and called EMS. Per EMS, the patient received nitroglycerin en route. She states that she felt normal today prior to her 's varicose vein rupture. She notes that she had her pacemaker replaced ~ 5 weeks ago, and she had her pacemaker re-evaluated 2 weeks ago with no significant findings. She also reports that she had a stress test a \"few years ago,\" with no significant findings. She states that she had a history of rheumatic fever and two cardiac ablations. She denies a history of CAD or a history of cardiac catheterizations. She specifically denies SOB or other complaints at this time. PCP: Vickey Howe MD  Cardiology: Kevin Colon MD    Social history significant for: - Tobacco, - EtOH    There are no other complaints, changes, or physical findings at this time. Written by SIL Beltran, as dictated by Tyler Dobbs MD.    The history is provided by the patient. No  was used.         Past Medical History:   Diagnosis Date    Arrhythmia 58    HX TACHYCARDIA, TX ABLATION    Arthritis     GERD (gastroesophageal reflux disease)     Hypertension     Nausea & vomiting     SEE NOTE PAPER CHART PREVIOUS  MEDS USES TO PREVENT N&V    Thyroid disease     HYPOTHYROID       Past Surgical History:   Procedure Laterality Date    HX APPENDECTOMY      HX  SECTION        x3, hysterectomy    HX CHOLECYSTECTOMY      HX HEENT      tonsillectomy    HX HYSTERECTOMY      HX ORTHOPAEDIC      right knee orthoscopic, right wrist surgery         Family History: Problem Relation Age of Onset    Diabetes Mother     Lung Disease Father        Social History     Social History    Marital status:      Spouse name: N/A    Number of children: N/A    Years of education: N/A     Occupational History    Not on file. Social History Main Topics    Smoking status: Never Smoker    Smokeless tobacco: Not on file    Alcohol use Yes      Comment: 2-3 GLASSES WINE PER MONTH    Drug use: Not on file    Sexual activity: Not on file     Other Topics Concern    Not on file     Social History Narrative         ALLERGIES: Epinephrine; Mold extracts; and Morphine    Review of Systems   Constitutional: Negative for activity change, appetite change, fatigue and fever. HENT: Negative. Negative for congestion, rhinorrhea and sore throat. Respiratory: Negative. Negative for cough, shortness of breath and wheezing. Cardiovascular: Positive for chest pain. Negative for leg swelling. Gastrointestinal: Positive for nausea. Negative for abdominal distention, abdominal pain, constipation, diarrhea and vomiting. Endocrine: Negative. Genitourinary: Negative for difficulty urinating, dysuria, menstrual problem, vaginal bleeding and vaginal discharge. Musculoskeletal: Negative. Negative for arthralgias, joint swelling and myalgias. Skin: Negative. Negative for rash. Neurological: Negative. Negative for dizziness, weakness, light-headedness and headaches. Psychiatric/Behavioral: Negative. Vitals:    08/10/17 2231 08/11/17 0000 08/11/17 0334   BP: 126/78 129/67 121/76   Pulse: 83 75 78   Resp: 18 15 18   Temp: 97.5 °F (36.4 °C)     SpO2: 98% 99% 99%   Weight: 56.7 kg (125 lb)     Height: 5' 5\" (1.651 m)              Physical Exam   Constitutional: She is oriented to person, place, and time. She appears well-developed and well-nourished. HENT:   Head: Atraumatic. Mouth/Throat: Mucous membranes are dry.    Eyes: EOM are normal.   Cardiovascular: Normal rate, regular rhythm, normal heart sounds and intact distal pulses. Exam reveals no gallop and no friction rub. No murmur heard. Pulmonary/Chest: Effort normal and breath sounds normal. No respiratory distress. She has no wheezes. She has no rales. She exhibits no tenderness. Pacemaker to left chest wall with recent incision that is clean, dry, and intact   Abdominal: Soft. Bowel sounds are normal. She exhibits no distension and no mass. There is no tenderness. There is no rebound and no guarding. Musculoskeletal: Normal range of motion. She exhibits no edema or tenderness. Neurological: She is oriented to person, place, and time. Skin: Skin is warm. Bruise to the inner aspect of her RLE that appears several days old   Psychiatric: She has a normal mood and affect. Nursing note and vitals reviewed. MDM  Number of Diagnoses or Management Options  NSTEMI (non-ST elevated myocardial infarction) [facility]):   Takotsubo cardiomyopathy:   Diagnosis management comments:   DDx: Stress reaction Arneta Peamartín), atypical chest pain. Low suspicion for acute MI. Will obtain two sets of cardiac enzymes. Discuss with cardiology. Amount and/or Complexity of Data Reviewed  Clinical lab tests: ordered and reviewed  Tests in the radiology section of CPT®: ordered and reviewed  Tests in the medicine section of CPT®: ordered and reviewed  Review and summarize past medical records: yes  Discuss the patient with other providers: yes (Cardiology, Hospitalist)  Independent visualization of images, tracings, or specimens: yes         ED Course       Procedures     EKG interpretation: (Preliminary) 22:42  Rhythm: Atrial sensed ventricular paced rhythm; and regular . Rate (approx.): 78; Axis: normal; OH interval: normal; QRS interval: normal; J point elevation in I, II, V4-V6. Will repeat EKG in 15 minutes.    Written by Marichuy Thomas ED Scribe, as dictated by Iza Garcias MD.    Progress Note:  11:49 PM  The patient has a critical troponin level of 2.84. Will repeat EKG and page Cardiology. The patient does not have a prior EKG on file. Written by SIL Davies, as dictated by Gerald Rivas MD.    EKG interpretation: (Repeat) 0:06  Rhythm: Atrial sensed ventricular paced rhythm; and regular . Rate (approx.): 77; Axis: normal; [place] interval: normal; QRS interval: normal ; Persistent J point elevation in I, II and 1 mm ST elevation in V4-V6. Written by SIL Davies, as dictated by Gerald Rivas MD.    Consult Note:  12:21 AM  Gerald Rivas MD spoke with Dex Mccoy MD,  Specialty: Cardiology  Discussed pt's hx, disposition, and available diagnostic and imaging results. Reviewed care plans. Consultant agrees with plans as outlined. Dr. Yefri Jordan recommends to obtain another troponin in 3 hours. He states that if it is trending up, the patient can be admitted, if it does not increase, the patient can be discharged with close follow up with Cardiology tomorrow morning. Progress Note:  3:41 AM  The patient's repeat troponin is 7.55, will re-page Cardiology. Written by SIL Davies, as dictated by Gerald Rivas MD.    Progress Note:  3:45 AM  Re-spoke to Dr. Yefri Jordan regarding the patient's repeat elevated troponin. He recommends admission to the hospitalist with Cardiology consultation in the morning. He requests to be notified if her chest pain returns. The pt and family were updated on the plan, and they are in agreement. Written by SIL Davies, as dictated by Gerald Rivas MD.    CONSULT NOTE:   4:01 AM  Gerald Rivas MD spoke with Mary Hoffman MD,   Specialty: Hospitalist  Discussed pt's hx, disposition, and available diagnostic and imaging results. Reviewed care plans. Consultant will evaluate pt for admission.   Written by SIL Davies, as dictated by Gerald Rivas MD.    LABORATORY TESTS:  Recent Results (from the past 12 hour(s))   EKG, 12 LEAD, INITIAL    Collection Time: 08/10/17 10:42 PM   Result Value Ref Range    Ventricular Rate 78 BPM    Atrial Rate 78 BPM    P-R Interval 196 ms    QRS Duration 94 ms    Q-T Interval 400 ms    QTC Calculation (Bezet) 456 ms    Calculated P Axis 70 degrees    Calculated R Axis 64 degrees    Calculated T Axis 19 degrees    Diagnosis       Atrial-sensed ventricular-paced rhythm  Abnormal ECG  When compared with ECG of 08-JUN-2007 23:43,  Electronic ventricular pacemaker has replaced Sinus rhythm     CBC WITH AUTOMATED DIFF    Collection Time: 08/10/17 10:56 PM   Result Value Ref Range    WBC 9.0 3.6 - 11.0 K/uL    RBC 4.02 3.80 - 5.20 M/uL    HGB 12.4 11.5 - 16.0 g/dL    HCT 36.2 35.0 - 47.0 %    MCV 90.0 80.0 - 99.0 FL    MCH 30.8 26.0 - 34.0 PG    MCHC 34.3 30.0 - 36.5 g/dL    RDW 13.8 11.5 - 14.5 %    PLATELET 270 387 - 390 K/uL    NEUTROPHILS 72 32 - 75 %    LYMPHOCYTES 25 12 - 49 %    MONOCYTES 2 (L) 5 - 13 %    EOSINOPHILS 1 0 - 7 %    BASOPHILS 0 0 - 1 %    ABS. NEUTROPHILS 6.4 1.8 - 8.0 K/UL    ABS. LYMPHOCYTES 2.3 0.8 - 3.5 K/UL    ABS. MONOCYTES 0.2 0.0 - 1.0 K/UL    ABS. EOSINOPHILS 0.1 0.0 - 0.4 K/UL    ABS. BASOPHILS 0.0 0.0 - 0.1 K/UL   METABOLIC PANEL, COMPREHENSIVE    Collection Time: 08/10/17 10:56 PM   Result Value Ref Range    Sodium 138 136 - 145 mmol/L    Potassium 3.1 (L) 3.5 - 5.1 mmol/L    Chloride 106 97 - 108 mmol/L    CO2 24 21 - 32 mmol/L    Anion gap 8 5 - 15 mmol/L    Glucose 167 (H) 65 - 100 mg/dL    BUN 16 6 - 20 MG/DL    Creatinine 0.93 0.55 - 1.02 MG/DL    BUN/Creatinine ratio 17 12 - 20      GFR est AA >60 >60 ml/min/1.73m2    GFR est non-AA 59 (L) >60 ml/min/1.73m2    Calcium 8.4 (L) 8.5 - 10.1 MG/DL    Bilirubin, total 0.3 0.2 - 1.0 MG/DL    ALT (SGPT) 23 12 - 78 U/L    AST (SGOT) 19 15 - 37 U/L    Alk.  phosphatase 90 45 - 117 U/L    Protein, total 6.9 6.4 - 8.2 g/dL    Albumin 3.3 (L) 3.5 - 5.0 g/dL    Globulin 3.6 2.0 - 4.0 g/dL    A-G Ratio 0.9 (L) 1.1 - 2.2     CK W/ REFLX CKMB    Collection Time: 08/10/17 10:56 PM   Result Value Ref Range     26 - 192 U/L   TROPONIN I    Collection Time: 08/10/17 10:56 PM   Result Value Ref Range    Troponin-I, Qt. 2.84 (H) <0.05 ng/mL   EKG, 12 LEAD, SUBSEQUENT    Collection Time: 08/11/17 12:06 AM   Result Value Ref Range    Ventricular Rate 77 BPM    Atrial Rate 77 BPM    P-R Interval 198 ms    QRS Duration 102 ms    Q-T Interval 424 ms    QTC Calculation (Bezet) 479 ms    Calculated P Axis 69 degrees    Calculated R Axis 28 degrees    Calculated T Axis -14 degrees    Diagnosis       Atrial-sensed ventricular-paced rhythm  Abnormal ECG  When compared with ECG of 10-AUG-2017 22:42,  MANUAL COMPARISON REQUIRED, DATA IS UNCONFIRMED     TROPONIN I    Collection Time: 08/11/17  2:53 AM   Result Value Ref Range    Troponin-I, Qt. 7.55 (H) <0.05 ng/mL   CK W/ REFLX CKMB    Collection Time: 08/11/17  2:53 AM   Result Value Ref Range     (H) 26 - 192 U/L   CK-MB,QUANT. Collection Time: 08/11/17  2:53 AM   Result Value Ref Range    CK - MB 14.3 (H) <3.6 NG/ML    CK-MB Index 6.5 (H) 0 - 2.5         IMAGING RESULTS:  CXR Results  (Last 48 hours)               08/10/17 2333  XR CHEST PORT Final result    Impression:  IMPRESSION: No evidence of acute cardiopulmonary process. Narrative:  INDICATION:  Chest Pain        COMPARISON: 6/30/2017       FINDINGS: Single AP portable view of the chest obtained at 2321 demonstrates a   stable cardiomediastinal silhouette. The lungs are clear bilaterally. No osseous   abnormalities are seen. There is a left-sided pacer device. There is no   pneumothorax.                  MEDICATIONS GIVEN:  Medications   pantoprazole (PROTONIX) injection 40 mg (40 mg IntraVENous Given 8/11/17 0011)   HYDROmorphone (PF) (DILAUDID) injection 0.5 mg (0.5 mg IntraVENous Given 8/11/17 0011)   acetaminophen (TYLENOL) tablet 650 mg (650 mg Oral Given 8/11/17 9458)   LORazepam (ATIVAN) injection 1 mg (1 mg IntraVENous Given 8/11/17 1070) IMPRESSION:  1. NSTEMI (non-ST elevated myocardial infarction) ([facility] [address]        PLAN:  1. Admit to Hospitalist.    Admit Note:  4:02 AM  Pt is being admitted by Polo Fraga MD. The results of their tests and reason(s) for their admission have been discussed with pt and/or available family. They convey agreement and understanding for the need to be admitted and for admission diagnosis. This note is prepared by Marichuy Thomas, acting as a Scribe for Iza Garcias MD.    Iza Garcias MD: The scribe's documentation has been prepared under my direction and personally reviewed by me in its entirety. I confirm that the notes above accurately reflects all work, treatment, procedures, and medical decision making performed by me.

## 2017-08-11 NOTE — H&P
Hospitalist Admission Note    NAME: Wil Porras   :  1940   MRN:  [id number]     Date/Time:  2017 4:37 AM    Patient PCP: Torie Alexandra MD  ________________________________________________________________________    My assessment of this patient's clinical condition and my plan of care is as follows. Assessment / Plan:  NSTEMI  Interestingly chest pain started after emotional episode like Takotsubo but lasted more than an hour which is more consistent with NSTEMI  I discussed the case with oncall cardiology  Will place on Lovenox 1mg/kg SQ Q12H for now  Will keep NPO in case require cardiac cath in am   IVF  Will admit to cardiology service as per discussion with cardiology  Switch ACE inh to beta blockers  Start ASA  PRN nitroglycerine   Check FLP    Essential Hypertension  Switch Enalapril to BB due to above    PPM in place    GERD (gastroesophageal reflux disease) with h/o hiatal hernia  Continue PPIs    Code Status: Full  Surrogate Decision Maker:     DVT Prophylaxis: Therapeutic Lovenox for NSTEMI  GI Prophylaxis: On PPIs for GERD    Baseline: functional        Subjective:   CHIEF COMPLAINT: chest pain    HISTORY OF PRESENT ILLNESS:     Chao Miller is a 78 y.o.  female who presents with chest pain. As per patient, her  was bleeding from his varicose vein so she ran flight of stairs and then she started to have chest pain. Chest pain was 8/10 in intensity, crushing in nature, in center of chest, non radiating, constant and lasted for an hour. Pt claims, even after an hour it remain there with 2/10 in intensity. Pt denies any fever, chills, nausea, vomiting, diarrhea, chest pain, cough, shortness of breath and problems urination. In ED pt was given dilaudid for chest pain. He troponin noted to be remarkably elevated. We were asked to admit for work up and evaluation of the above problems.      Past Medical History:   Diagnosis Date    Arrhythmia     HX TACHYCARDIA, TX ABLATION    Arthritis     GERD (gastroesophageal reflux disease)     Hypertension     Nausea & vomiting     SEE NOTE PAPER CHART PREVIOUS  MEDS USES TO PREVENT N&V    Thyroid disease     HYPOTHYROID        Past Surgical History:   Procedure Laterality Date    HX APPENDECTOMY      HX  SECTION        x3, hysterectomy    HX CHOLECYSTECTOMY      HX HEENT      tonsillectomy    HX HYSTERECTOMY      HX ORTHOPAEDIC      right knee orthoscopic, right wrist surgery       Social History   Substance Use Topics    Smoking status: Never Smoker    Smokeless tobacco: Not on file    Alcohol use Yes      Comment: 2-3 GLASSES WINE PER MONTH        Family History   Problem Relation Age of Onset    Diabetes Mother     Lung Disease Father      Allergies   Allergen Reactions    Epinephrine Other (comments)     \"severe tachycardia\"     Mold Extracts Unknown (comments)    Morphine Nausea and Vomiting        Prior to Admission medications    Medication Sig Start Date End Date Taking? Authorizing Provider   traMADol (ULTRAM) 50 mg tablet Take 1 Tab by mouth every six (6) hours as needed for Pain. Max Daily Amount: 200 mg. 17   Paris Caro MD   traMADol (ULTRAM) 50 mg tablet Take 1-2 Tabs by mouth every six (6) hours as needed. Max Daily Amount: 400 mg. 17   Beverly Sanchez MD   multivitamin (ONE A DAY) tablet Take 1 Tab by mouth daily. Historical Provider   enalapril (VASOTEC) 10 mg tablet Take 10 mg by mouth daily. Perico Wetzel MD   levothyroxine (SYNTHROID) 100 mcg tablet Take 100 mcg by mouth Daily (before breakfast). DAILY EXCEPT     Perico Wetzel MD   omeprazole (PRILOSEC) 10 mg capsule Take 40 mg by mouth daily. Perico Wetzel MD       REVIEW OF SYSTEMS:     I am not able to complete the review of systems because:    The patient is intubated and sedated    The patient has altered mental status due to his acute medical problems    The patient has baseline aphasia from prior stroke(s)    The patient has baseline dementia and is not reliable historian    The patient is in acute medical distress and unable to provide information           Total of 12 systems reviewed as follows:       POSITIVE= underlined text  Negative = text not underlined  General:  fever, chills, sweats, generalized weakness, weight loss/gain,      loss of appetite   Eyes:    blurred vision, eye pain, loss of vision, double vision  ENT:    rhinorrhea, pharyngitis   Respiratory:   cough, sputum production, SOB, HINES, wheezing, pleuritic pain   Cardiology:   chest pain, palpitations, orthopnea, PND, edema, syncope   Gastrointestinal:  abdominal pain , N/V, diarrhea, dysphagia, constipation, bleeding   Genitourinary:  frequency, urgency, dysuria, hematuria, incontinence   Muskuloskeletal :  arthralgia, myalgia, back pain  Hematology:  easy bruising, nose or gum bleeding, lymphadenopathy   Dermatological: rash, ulceration, pruritis, color change / jaundice  Endocrine:   hot flashes or polydipsia   Neurological:  headache, dizziness, confusion, focal weakness, paresthesia,     Speech difficulties, memory loss, gait difficulty  Psychological: Feelings of anxiety, depression, agitation    Objective:   VITALS:    Visit Vitals    /76 (BP 1 Location: Right arm)    Pulse 78    Temp 97.5 °F (36.4 °C)    Resp 18    Ht 5' 5\" (1.651 m)    Wt 56.7 kg (125 lb)    SpO2 99%    BMI 20.8 kg/m2       PHYSICAL EXAM:    General:    Alert, cooperative, no distress, appears stated age. HEENT: Atraumatic, anicteric sclerae, pink conjunctivae     No oral ulcers, mucosa moist, throat clear, dentition fair  Neck:  Supple, symmetrical,  thyroid: non tender  Lungs:   Clear to auscultation bilaterally. No Wheezing or Rhonchi. No rales. Chest wall:  No tenderness  No Accessory muscle use. Heart:   Regular  rhythm,  No  murmur   No edema  Abdomen:   Soft, non-tender. Not distended.   Bowel sounds normal  Extremities: No cyanosis. No clubbing,      Skin turgor normal, Capillary refill normal, Radial dial pulse 2+  Skin:     Not pale. Not Jaundiced  No rashes   Psych:  Good insight. Not depressed. Not anxious or agitated. Neurologic: EOMs intact. No facial asymmetry. No aphasia or slurred speech. Symmetrical strength, Sensation grossly intact. Alert and oriented X 4.     _______________________________________________________________________  Care Plan discussed with:    Comments   Patient y    Family      RN y    Care Manager                    Consultant:  [facility], ED physician   _______________________________________________________________________  Expected  Disposition:   Home with Family y   HH/PT/OT/RN    SNF/LTC    IVAN    ________________________________________________________________________  TOTAL TIME: 61 Minutes    Critical Care Provided     Minutes non procedure based      Comments    y Reviewed previous records   >50% of visit spent in counseling and coordination of care y Discussion with patient and family and questions answered  Discussion with oncall cardiology Dr Sridhar Arciniega       ________________________________________________________________________  Signed: Butron Crockett MD    Procedures: see electronic medical records for all procedures/Xrays and details which were not copied into this note but were reviewed prior to creation of Plan.     LAB DATA REVIEWED:    Recent Results (from the past 24 hour(s))   EKG, 12 LEAD, INITIAL    Collection Time: 08/10/17 10:42 PM   Result Value Ref Range    Ventricular Rate 78 BPM    Atrial Rate 78 BPM    P-R Interval 196 ms    QRS Duration 94 ms    Q-T Interval 400 ms    QTC Calculation (Bezet) 456 ms    Calculated P Axis 70 degrees    Calculated R Axis 64 degrees    Calculated T Axis 19 degrees    Diagnosis       Atrial-sensed ventricular-paced rhythm  Abnormal ECG  When compared with ECG of 08-JUN-2007 23:43,  Electronic ventricular pacemaker has replaced Sinus rhythm     CBC WITH AUTOMATED DIFF    Collection Time: 08/10/17 10:56 PM   Result Value Ref Range    WBC 9.0 3.6 - 11.0 K/uL    RBC 4.02 3.80 - 5.20 M/uL    HGB 12.4 11.5 - 16.0 g/dL    HCT 36.2 35.0 - 47.0 %    MCV 90.0 80.0 - 99.0 FL    MCH 30.8 26.0 - 34.0 PG    MCHC 34.3 30.0 - 36.5 g/dL    RDW 13.8 11.5 - 14.5 %    PLATELET 445 841 - 779 K/uL    NEUTROPHILS 72 32 - 75 %    LYMPHOCYTES 25 12 - 49 %    MONOCYTES 2 (L) 5 - 13 %    EOSINOPHILS 1 0 - 7 %    BASOPHILS 0 0 - 1 %    ABS. NEUTROPHILS 6.4 1.8 - 8.0 K/UL    ABS. LYMPHOCYTES 2.3 0.8 - 3.5 K/UL    ABS. MONOCYTES 0.2 0.0 - 1.0 K/UL    ABS. EOSINOPHILS 0.1 0.0 - 0.4 K/UL    ABS. BASOPHILS 0.0 0.0 - 0.1 K/UL   METABOLIC PANEL, COMPREHENSIVE    Collection Time: 08/10/17 10:56 PM   Result Value Ref Range    Sodium 138 136 - 145 mmol/L    Potassium 3.1 (L) 3.5 - 5.1 mmol/L    Chloride 106 97 - 108 mmol/L    CO2 24 21 - 32 mmol/L    Anion gap 8 5 - 15 mmol/L    Glucose 167 (H) 65 - 100 mg/dL    BUN 16 6 - 20 MG/DL    Creatinine 0.93 0.55 - 1.02 MG/DL    BUN/Creatinine ratio 17 12 - 20      GFR est AA >60 >60 ml/min/1.73m2    GFR est non-AA 59 (L) >60 ml/min/1.73m2    Calcium 8.4 (L) 8.5 - 10.1 MG/DL    Bilirubin, total 0.3 0.2 - 1.0 MG/DL    ALT (SGPT) 23 12 - 78 U/L    AST (SGOT) 19 15 - 37 U/L    Alk.  phosphatase 90 45 - 117 U/L    Protein, total 6.9 6.4 - 8.2 g/dL    Albumin 3.3 (L) 3.5 - 5.0 g/dL    Globulin 3.6 2.0 - 4.0 g/dL    A-G Ratio 0.9 (L) 1.1 - 2.2     CK W/ REFLX CKMB    Collection Time: 08/10/17 10:56 PM   Result Value Ref Range     26 - 192 U/L   TROPONIN I    Collection Time: 08/10/17 10:56 PM   Result Value Ref Range    Troponin-I, Qt. 2.84 (H) <0.05 ng/mL   EKG, 12 LEAD, SUBSEQUENT    Collection Time: 08/11/17 12:06 AM   Result Value Ref Range    Ventricular Rate 77 BPM    Atrial Rate 77 BPM    P-R Interval 198 ms    QRS Duration 102 ms    Q-T Interval 424 ms    QTC Calculation (Bezet) 479 ms    Calculated P Axis 69 degrees    Calculated R Axis 28 degrees    Calculated T Axis -14 degrees    Diagnosis       Atrial-sensed ventricular-paced rhythm  Abnormal ECG  When compared with ECG of 10-AUG-2017 22:42,  MANUAL COMPARISON REQUIRED, DATA IS UNCONFIRMED     TROPONIN I    Collection Time: 08/11/17  2:53 AM   Result Value Ref Range    Troponin-I, Qt. 7.55 (H) <0.05 ng/mL   CK W/ REFLX CKMB    Collection Time: 08/11/17  2:53 AM   Result Value Ref Range     (H) 26 - 192 U/L   CK-MB,QUANT.     Collection Time: 08/11/17  2:53 AM   Result Value Ref Range    CK - MB 14.3 (H) <3.6 NG/ML    CK-MB Index 6.5 (H) 0 - 2.5

## 2017-08-12 LAB
ALBUMIN SERPL BCP-MCNC: 3 G/DL (ref 3.5–5)
ALBUMIN/GLOB SERPL: 0.9 {RATIO} (ref 1.1–2.2)
ALP SERPL-CCNC: 74 U/L (ref 45–117)
ALT SERPL-CCNC: 19 U/L (ref 12–78)
ANION GAP BLD CALC-SCNC: 4 MMOL/L (ref 5–15)
AST SERPL W P-5'-P-CCNC: 26 U/L (ref 15–37)
BILIRUB SERPL-MCNC: 0.5 MG/DL (ref 0.2–1)
BUN SERPL-MCNC: 14 MG/DL (ref 6–20)
BUN/CREAT SERPL: 16 (ref 12–20)
CALCIUM SERPL-MCNC: 8.7 MG/DL (ref 8.5–10.1)
CHLORIDE SERPL-SCNC: 107 MMOL/L (ref 97–108)
CHOLEST SERPL-MCNC: 166 MG/DL
CO2 SERPL-SCNC: 27 MMOL/L (ref 21–32)
CREAT SERPL-MCNC: 0.86 MG/DL (ref 0.55–1.02)
GLOBULIN SER CALC-MCNC: 3.4 G/DL (ref 2–4)
GLUCOSE SERPL-MCNC: 95 MG/DL (ref 65–100)
HDLC SERPL-MCNC: 54 MG/DL
HDLC SERPL: 3.1 {RATIO} (ref 0–5)
LDLC SERPL CALC-MCNC: 82.4 MG/DL (ref 0–100)
LIPID PROFILE,FLP: NORMAL
POTASSIUM SERPL-SCNC: 4.6 MMOL/L (ref 3.5–5.1)
PROT SERPL-MCNC: 6.4 G/DL (ref 6.4–8.2)
SODIUM SERPL-SCNC: 138 MMOL/L (ref 136–145)
TRIGL SERPL-MCNC: 148 MG/DL (ref ?–150)
VLDLC SERPL CALC-MCNC: 29.6 MG/DL

## 2017-08-12 PROCEDURE — 80053 COMPREHEN METABOLIC PANEL: CPT | Performed by: INTERNAL MEDICINE

## 2017-08-12 PROCEDURE — 65660000000 HC RM CCU STEPDOWN

## 2017-08-12 PROCEDURE — 74011250637 HC RX REV CODE- 250/637: Performed by: INTERNAL MEDICINE

## 2017-08-12 PROCEDURE — 80061 LIPID PANEL: CPT | Performed by: INTERNAL MEDICINE

## 2017-08-12 PROCEDURE — 36415 COLL VENOUS BLD VENIPUNCTURE: CPT | Performed by: INTERNAL MEDICINE

## 2017-08-12 RX ORDER — LORAZEPAM 0.5 MG/1
0.5 TABLET ORAL
Status: DISCONTINUED | OUTPATIENT
Start: 2017-08-12 | End: 2017-08-13 | Stop reason: HOSPADM

## 2017-08-12 RX ADMIN — ENALAPRIL MALEATE 10 MG: 5 TABLET ORAL at 08:26

## 2017-08-12 RX ADMIN — TRAMADOL HYDROCHLORIDE 50 MG: 50 TABLET, FILM COATED ORAL at 07:32

## 2017-08-12 RX ADMIN — TRAMADOL HYDROCHLORIDE 50 MG: 50 TABLET, FILM COATED ORAL at 13:33

## 2017-08-12 RX ADMIN — LORAZEPAM 0.5 MG: 0.5 TABLET ORAL at 22:17

## 2017-08-12 RX ADMIN — Medication 10 ML: at 05:29

## 2017-08-12 RX ADMIN — LEVOTHYROXINE SODIUM 100 MCG: 25 TABLET ORAL at 07:32

## 2017-08-12 RX ADMIN — ACETAMINOPHEN 650 MG: 325 TABLET ORAL at 17:30

## 2017-08-12 RX ADMIN — ACETAMINOPHEN 650 MG: 325 TABLET ORAL at 22:17

## 2017-08-12 RX ADMIN — METOPROLOL SUCCINATE 12.5 MG: 25 TABLET, EXTENDED RELEASE ORAL at 08:26

## 2017-08-12 RX ADMIN — ASPIRIN 325 MG ORAL TABLET 325 MG: 325 PILL ORAL at 08:26

## 2017-08-12 RX ADMIN — ATORVASTATIN CALCIUM 20 MG: 20 TABLET, FILM COATED ORAL at 22:16

## 2017-08-12 RX ADMIN — Medication 10 ML: at 22:18

## 2017-08-12 RX ADMIN — PANTOPRAZOLE SODIUM 40 MG: 40 TABLET, DELAYED RELEASE ORAL at 07:32

## 2017-08-12 NOTE — ROUTINE PROCESS
Verbal and bedside report received from Ida Haskins RN by [employer]. Resumed care of pt. VSS. Pt sitting up in the chair eating lunch. Will continue to monitor.

## 2017-08-12 NOTE — PROGRESS NOTES
Visited by Chris Oliveira Partner on 8/12/17.     MANUELA Galicia, [facility], [address]     Paging Service  [phone])

## 2017-08-12 NOTE — CARDIO/PULMONARY
Cardiopulmonary Rehab:    Chart reviewed. Pt is a 78 y.o. female admitted with NSTEMI. S/P cardiac cath, no intervention. Pt with Takotsubo cardiomyopathy. LVEF 35-40%. Mhx: pacemaker, HTN, dyslipidemia, ablation,      Never smoked. Pt visited,  at the bedside. Patient given printed teaching materials on MI and the cardiac diet. Instruction given on symptom identification of MI and the importance of prompt treatment. Discussed checking weight every am and calling MD if weight is up 2-3 lbs in a day or 5 lbs in a week (or as directed by the physician). Also discussed the cardiac diet (low NA/fat/chol). Reviewed progressive return to activity as tolerated with frequent rest periods as needed, taking medications as prescribed, the benefits of outpatient cardiac rehab and the importance of follow up visits with physician. Pt is scheduled to begin outpatient cardiopulmonary rehabilitation Wednesday, September 6, 2017 @ 0900. Orientation packet provided. Discussed post catheterization restrictions including no lifting, no tub baths and no straining for 7 days. Also discussed what to do if bleeding or bruising at the cath insertion site is observed. Pt very anxious. Emotional support provided. Encouraged patient/ to verbalize concerns/questions. Will follow as appropriate for MI education and support.

## 2017-08-12 NOTE — PROGRESS NOTES
Bedside and Verbal shift change report given to Odette Hoffman (oncoming nurse) by Moris Powers (offgoing nurse). Report included the following information SBAR, Kardex, Intake/Output, MAR, Accordion and Recent Results.

## 2017-08-12 NOTE — PROGRESS NOTES
1900 - Bedside report from [facility]. Paced. Eating meal.  ELROY douglass benign, +PPP. No complaints. 1030 - Up to ambulate in hallway 300 feet without difficulty or complaints. ELROY douglass benign. 0700 - Bedside report to RN. Paced. R groin sore this am.  Day nurse will medicate.

## 2017-08-12 NOTE — PROGRESS NOTES
Cardiology Progress Note  2017     Admit Date: 8/10/2017  Admit Diagnosis: NSTEMI (non-ST elevated myocardial infarction) ([facility] [address]  CC: none currently  Cardiac Assessment/Plan:   CM: Echo: Ef 35-40%; ant/apical WMA; Mild-mod MR. No CAD at cath. Takotsubo CM: med Rx; cont bblocker/acei. HTN: stable. Rhythm: sinus    Volume status:euvolemic  Renal function: stable    Anxiety D/O: per PCP; short-term ativan prn. Dispo: mobilize further; d/c tomorrow if tolerating meds. For other plans, see orders.   Hospital problem list   Active Hospital Problems    Diagnosis Date Noted    NSTEMI (non-ST elevated myocardial infarction) ([facility] [address] 2017    HTN (hypertension) 2017    History of permanent cardiac pacemaker placement 2017    GERD (gastroesophageal reflux disease) 2017    Essential hypertension, benign 2013        Subjective: Altaf Parnell reports   Chest pain X none  consistent with:  Non-cardiac CP         Atypical CP     None now  On going  Anginal CP     Dyspnea  none  at rest  with exertion        x improved  unchanged  worse              PND X none  overnight       Orthopnea X none  improved  unchanged  worse   Presyncope X none  improved  unchanged  worse     Ambulated in hallway without symptoms   Yes   Ambulated in room without symptoms  Yes   Objective:    Physical Exam:  Overall VSSAF;    Visit Vitals    /83 (BP 1 Location: Left arm, BP Patient Position: At rest)    Pulse 64    Temp 98 °F (36.7 °C)    Resp 16    Ht 5' 5\" (1.651 m)    Wt 56.7 kg (125 lb)    SpO2 98%    Breastfeeding No    BMI 20.8 kg/m2     Temp (24hrs), Av.9 °F (36.6 °C), Min:97.6 °F (36.4 °C), Max:98.2 °F (36.8 °C)    Patient Vitals for the past 8 hrs:   Pulse   17 0655 64   17 0517 76    Patient Vitals for the past 8 hrs:   Resp   17 0655 16   17 0517 16    Patient Vitals for the past 8 hrs:   BP   17 0655 151/83   17 7055 114/70        Intake/Output Summary (Last 24 hours) at 08/12/17 1036  Last data filed at 08/12/17 0655   Gross per 24 hour   Intake              480 ml   Output                0 ml   Net              480 ml     General Appearance: Well developed, well nourished, no acute distress. Ears/Nose/Mouth/Throat:   Normal MM; anicteric. JVP: WNL   Resp:   clear to auscultation bilaterally. Nl resp effort. Cardiovascular:  RRR, S1, S2 normal, no new murmur. No gallop or rub. Abdomen:   Soft, non-tender, bowel sounds are present. Extremities: No edema bilaterally. Skin:  Neuro: Warm and dry. A/O x3, grossly nonfocal   cath site intact w/o hematoma or new bruit; distal pulse unchanged x Yes   Data Review:     Telemetry independently reviewed x sinus x paced  parox afib  NSVT     ECG independently reviewed  NSR  afib  no significant changes  NSST-Tw chgs   x no new ECG provided for review   Lab results reviewed as noted below. Current medications reviewed as noted below. No results for input(s): PH, PCO2, PO2 in the last 72 hours. Recent Labs      08/11/17   0253  08/10/17   2256   CKMB  14.3*   --    CKNDX  6.5*   --    TROIQ  7.55*  2.84*     Recent Labs      08/12/17   0524  08/10/17   2256   NA  138  138   K  4.6  3.1*   CL  107  106   CO2  27  24   BUN  14  16   CREA  0.86  0.93   GLU  95  167*   CA  8.7  8.4*   ALB  3.0*  3.3*   WBC   --   9.0   HGB   --   12.4   HCT   --   36.2   PLT   --   283     Lab Results   Component Value Date/Time    Cholesterol, total 166 08/12/2017 05:24 AM    HDL Cholesterol 54 08/12/2017 05:24 AM    LDL, calculated 82.4 08/12/2017 05:24 AM    Triglyceride 148 08/12/2017 05:24 AM    CHOL/HDL Ratio 3.1 08/12/2017 05:24 AM     Recent Labs      08/12/17   0524  08/10/17   2256   SGOT  26  19   AP  74  90   TP  6.4  6.9   ALB  3.0*  3.3*   GLOB  3.4  3.6     No results for input(s): INR, PTP, APTT in the last 72 hours.     No lab exists for component: INREXT   No components found for: [place]    Current Facility-Administered Medications   Medication Dose Route Frequency    aspirin (ASPIRIN) tablet 325 mg  325 mg Oral DAILY    levothyroxine (SYNTHROID) tablet 100 mcg  100 mcg Oral ACB    pantoprazole (PROTONIX) tablet 40 mg  40 mg Oral ACB    traMADol (ULTRAM) tablet 50 mg  50 mg Oral Q6H PRN    morphine injection 2 mg  2 mg IntraVENous Q3H PRN    sodium chloride (NS) flush 5-10 mL  5-10 mL IntraVENous Q8H    sodium chloride (NS) flush 5-10 mL  5-10 mL IntraVENous PRN    acetaminophen (TYLENOL) tablet 650 mg  650 mg Oral Q6H PRN    ondansetron (ZOFRAN) injection 4 mg  4 mg IntraVENous Q4H PRN    nitroglycerin (NITROSTAT) tablet 0.4 mg  0.4 mg SubLINGual PRN    atorvastatin (LIPITOR) tablet 20 mg  20 mg Oral QHS    sodium chloride (NS) flush 5-10 mL  5-10 mL IntraVENous Q8H    sodium chloride (NS) flush 5-10 mL  5-10 mL IntraVENous PRN    naloxone (NARCAN) injection 0.4 mg  0.4 mg IntraVENous PRN    diphenhydrAMINE (BENADRYL) injection 25 mg  25 mg IntraVENous Q4H PRN    metoprolol succinate (TOPROL-XL) XL tablet 12.5 mg  12.5 mg Oral DAILY    enalapril (VASOTEC) tablet 10 mg  10 mg Oral DAILY        Marion Garcia MD

## 2017-08-13 VITALS
BODY MASS INDEX: 20.83 KG/M2 | SYSTOLIC BLOOD PRESSURE: 138 MMHG | HEIGHT: 65 IN | TEMPERATURE: 98 F | WEIGHT: 125 LBS | OXYGEN SATURATION: 96 % | DIASTOLIC BLOOD PRESSURE: 76 MMHG | RESPIRATION RATE: 13 BRPM | HEART RATE: 61 BPM

## 2017-08-13 PROCEDURE — 74011250637 HC RX REV CODE- 250/637: Performed by: INTERNAL MEDICINE

## 2017-08-13 RX ORDER — METOPROLOL SUCCINATE 25 MG/1
25 TABLET, EXTENDED RELEASE ORAL DAILY
Status: DISCONTINUED | OUTPATIENT
Start: 2017-08-14 | End: 2017-08-13 | Stop reason: HOSPADM

## 2017-08-13 RX ORDER — LEVOTHYROXINE SODIUM 100 UG/1
100 TABLET ORAL
Status: DISCONTINUED | OUTPATIENT
Start: 2017-08-13 | End: 2017-08-13 | Stop reason: HOSPADM

## 2017-08-13 RX ORDER — PRAVASTATIN SODIUM 10 MG/1
10 TABLET ORAL
Status: DISCONTINUED | OUTPATIENT
Start: 2017-08-13 | End: 2017-08-13 | Stop reason: HOSPADM

## 2017-08-13 RX ORDER — LORAZEPAM 0.5 MG/1
0.5 TABLET ORAL
Qty: 20 TAB | Refills: 0 | Status: SHIPPED | OUTPATIENT
Start: 2017-08-13

## 2017-08-13 RX ORDER — PRAVASTATIN SODIUM 10 MG/1
10 TABLET ORAL
Qty: 30 TAB | Refills: 12 | Status: SHIPPED | OUTPATIENT
Start: 2017-08-13 | End: 2022-02-22

## 2017-08-13 RX ORDER — METOPROLOL SUCCINATE 25 MG/1
25 TABLET, EXTENDED RELEASE ORAL DAILY
Qty: 30 TAB | Refills: 12 | Status: SHIPPED | OUTPATIENT
Start: 2017-08-14 | End: 2022-02-22

## 2017-08-13 RX ORDER — GUAIFENESIN 100 MG/5ML
81 LIQUID (ML) ORAL DAILY
Qty: 30 TAB | Refills: 12 | Status: SHIPPED
Start: 2017-08-14 | End: 2022-03-04

## 2017-08-13 RX ORDER — GUAIFENESIN 100 MG/5ML
81 LIQUID (ML) ORAL DAILY
Status: DISCONTINUED | OUTPATIENT
Start: 2017-08-14 | End: 2017-08-13 | Stop reason: HOSPADM

## 2017-08-13 RX ADMIN — ENALAPRIL MALEATE 10 MG: 5 TABLET ORAL at 07:52

## 2017-08-13 RX ADMIN — METOPROLOL SUCCINATE 12.5 MG: 25 TABLET, EXTENDED RELEASE ORAL at 07:53

## 2017-08-13 RX ADMIN — ASPIRIN 325 MG ORAL TABLET 325 MG: 325 PILL ORAL at 07:52

## 2017-08-13 RX ADMIN — PANTOPRAZOLE SODIUM 40 MG: 40 TABLET, DELAYED RELEASE ORAL at 06:37

## 2017-08-13 NOTE — DISCHARGE INSTRUCTIONS
[address], [address]    [phone]  [place], [address] [facility]    [email or web address]    Patient Discharge Instructions    Daniel Connors / [id number] : 1940    Admitted 8/10/2017 Discharged 2017     · It is important that you take the medication exactly as they are prescribed. · Keep your medication in the bottles provided by the pharmacist and keep a list of the medication names, dosages, and times to be taken in your wallet. · Do not take other medications without consulting your doctor. BRING ALL OF YOUR MEDICINES TO YOUR OFFICE VISIT with Dr. Crispin Bowles. Follow-up:   Follow-up Information     Follow up With Details Comments Charu Hung MD  to discuss long term treatment of anxiety [place] [phone]      Damien Finney MD Schedule an appointment as soon as possible for a visit in 2 weeks  [address]  [id number]  [address] [facility]              Cardiac Catheterization  Discharge Instructions     Do not drive, operate any machinery, or sign any legal documents for 24 hours after your procedure. You must have someone to drive you home.  You may take a shower 24 hours after your cardiac catheterization. Be sure to get the dressing wet and then remove it; gently wash the area with warm soapy water. Pat dry and leave open to air. To help prevent infections, be sure to keep the cath site clean and dry. No lotions, creams, powders, ointments, etc. in the cath site for approximately 1 week.  Do not take a tub bath, get in a hot tub or swimming pool for approximately 5 days or until the cath site is completely healed.  No strenuous activity or heavy lifting over 10 lbs. for 7 days.  Drink plenty of fluids for 24-48 hours after your cath to flush the contrast dye from your kidneys. No alcoholic beverages for 24 hours.   You may resume your previous diet (low fat, low cholesterol) after your cath.       After your cath, some bruising or discomfort is common during the healing process. Tylenol, 1-2 tablets every 6 hours as needed, is recommended if you experience any discomfort. If you experience any signs or symptoms of infection such as fever, chills, or poorly healing incision, persistent tenderness or swelling in the groin, redness and/or warmth to the touch, numbness, significant tingling or pain at the groin site or affected extremity, rash, drainage from the cath site, or if the leg feels tight or swollen, call your physician right away.  If bleeding at the cath site occurs, take a clean gauze pad and apply direct pressure to the groin just above the puncture site. Call 911 immediately, and continue to apply direct pressure until an ambulance gets to your location.  You may return to work  2  days after your cardiac cath if no groin bleeding. Heart Failure: After Your Visit  Your Care Instructions  Heart failure occurs when your heart does not pump as much blood as the body needs. Failure does not mean that the heart has stopped pumping but rather that it is not pumping as well as it should. Over time, this causes fluid buildup in your lungs and other parts of your body. Fluid buildup can cause shortness of breath, fatigue, swollen ankles, and other problems. By taking medicines regularly, reducing sodium (salt) in your diet, checking your weight every day, and making lifestyle changes, you can feel better and live longer. Follow-up care is a key part of your treatment and safety. Be sure to make and go to all appointments, and call your doctor if you are having problems. You need to keep a list of the medicines you take and the medicine dosages. How can you care for yourself at home? Diet  · Limit sodium (salt) in your diet to less than 1800 mg per day. People get most of their sodium from table salt that is added to food and from processed foods.  Fast food and restaurant meals also tend to be very high in sodium. Do not add salt to your food. · Limit your liquids to  1.5 quarts per day. Medicines  · Take your medicines exactly as prescribed. Call your doctor if you think you are having a problem with your medicine. · Do not take any vitamins, over-the-counter medicine, or herbal products without talking to your doctor first. Sherolyn Combe not take ibuprofen (Advil or Motrin) and naproxen (Aleve) without talking to your doctor first. They could make your heart failure worse. · You may be taking some of the following medicine. ¨ Beta-blockers can slow heart rate, decrease blood pressure, and improve your condition. Taking a beta-blocker may lower your chance of needing to be hospitalized again. ¨ Angiotensin-converting enzyme inhibitors (ACEIs) reduce the heart's workload, lower blood pressure, and reduce swelling. Taking an ACEI may lower your chance of needing to be hospitalized again. ¨ Angiotensin II receptor blockers (ARBs) work like ACEIs. Your doctor may prescribe them instead of or in addition to ACEIs. ¨ Diuretics, also called water pills, reduce swelling. ¨ Spironolactone is a diuretic that also keeps heart failure from getting worse. ¨ Digoxin reduces symptoms for some people with heart failure. ¨ Aspirin and other blood thinners prevent blood clots, which can cause a stroke or heart attack. ¨ Potassium supplements replace this important mineral, which is sometimes lost with diuretics. When should you call for help? Call 911 if you have symptoms of sudden heart failure such as:  · You have severe trouble breathing. · You cough up pink, foamy mucus. · You have a new irregular or rapid heartbeat. Call your doctor now or seek immediate medical care if:  · You have new or increased shortness of breath. · You are dizzy or lightheaded, or you feel like you may faint. · You have sudden weight gain, such as 3 pounds or more in 2 to 3 days.   · You have increased swelling in your legs, ankles, or feet. · You are suddenly so tired or weak that you cannot do your usual activities. Watch closely for changes in your health, and be sure to contact your doctor if:  · You develop new symptoms. ·     Lifestyle changes  Do not smoke. Smoking increases your risk of a heart attack. If you need help quitting, talk to your doctor about stop-smoking programs and medicines. These can increase your chances of quitting for good. Eat a heart-healthy diet that is low in cholesterol, saturated fat, and salt, and is full of fruits, vegetables and whole-grains. Eat at least two servings of fish each week. You may get more details about how to eat healthy, but these tips can help you get started. Avoid colds and flu. Get a pneumococcal vaccine shot. If you have had one before, ask your doctor whether you need a second dose. Get a flu shot every fall. If you must be around people with colds or flu, wash your hands often. Watch closely for changes in your health, and be sure to contact your doctor if you have any problem      Information obtained by :  I understand that if any problems occur once I am at home I am to contact my physician. I understand and acknowledge receipt of the instructions indicated above. R.N.'s Signature                                                                  Date/Time                                                                                                                                              Patient or Representative Signature                                                          Date/Time      Zachariah Turpin MD         [address], [address]    [phone]  [place], [address] [facility]    [email or web address]

## 2017-08-13 NOTE — DISCHARGE SUMMARY
Cardiology Discharge Summary     Patient ID: Meng Talavera  [id number]  14 y.o.  1940    Admit Date: 8/10/2017  Discharge Date: 8/13/2017   Admitting Physician: Leobardo Peñaloza MD   Discharge Physician: Shane Parada MD    Admission Diagnoses: NSTEMI (non-ST elevated myocardial infarction) [facility])    Discharge Diagnoses:   Takotsubo CM   HTN heart disease    NSTEMI (non-ST elevated myocardial infarction) ([address] (8/11/2017)   Anxiety    History of permanent cardiac pacemaker placement (8/11/2017)    GERD (gastroesophageal reflux disease) (8/11/2017)    Cardiology Procedures this Admission:  Diagnostic left heart catheterization    Hospital Course:  CM: NQWMI but enzymes (/_MB & index; trop 7.6); Echo: Ef 35-40%; ant/apical WMA; Mild-mod MR. No CAD at cath.     Takotsubo CM: med Rx; cont bblocker/acei.     HTN: stable.     Rhythm: sinus     Volume status:euvolemic  Renal function: stable    Lipids: increased pravachol to qhs from 2x/week (pt concerned about potential LFT abnl: none).    Anxiety D/O: per PCP; short-term ativan prn. Recent Labs      08/12/17   0524  08/10/17   2256   NA  138  138   K  4.6  3.1*   BUN  14  16   CREA  0.86  0.93   WBC   --   9.0   HGB   --   12.4   HCT   --   36.2   PLT   --   283     Lab Results   Component Value Date/Time    Cholesterol, total 166 08/12/2017 05:24 AM    HDL Cholesterol 54 08/12/2017 05:24 AM    LDL, calculated 82.4 08/12/2017 05:24 AM    Triglyceride 148 08/12/2017 05:24 AM     Recent Labs      08/12/17   0524  08/10/17   2256   SGOT  26  19   ALT  19  23   AP  74  90       Patient was ambulating without symptoms prior to d/c. Consults: None  Disposition: home  Discharge Condition: Stable  Patient Instructions:   Current Discharge Medication List      START taking these medications    Details   aspirin 81 mg chewable tablet Take 1 Tab by mouth daily.   Qty: 30 Tab, Refills: 12      LORazepam (ATIVAN) 0.5 mg tablet Take 1 Tab by mouth every six (6) hours as needed. Max Daily Amount: 2 mg. Do not drive or drink alcohol if taking this medicine. Qty: 20 Tab, Refills: 0      metoprolol succinate (TOPROL-XL) 25 mg XL tablet Take 1 Tab by mouth daily. Qty: 30 Tab, Refills: 12      pravastatin (PRAVACHOL) 10 mg tablet Take 1 Tab by mouth nightly. Qty: 30 Tab, Refills: 12         CONTINUE these medications which have NOT CHANGED    Details   multivitamin (ONE A DAY) tablet Take 1 Tab by mouth daily. enalapril (VASOTEC) 10 mg tablet Take 10 mg by mouth daily. levothyroxine (SYNTHROID) 100 mcg tablet Take 100 mcg by mouth Daily (before breakfast). DAILY EXCEPT SUNDAY      omeprazole (PRILOSEC) 10 mg capsule Take 40 mg by mouth daily. traMADol (ULTRAM) 50 mg tablet Take 1 Tab by mouth every six (6) hours as needed for Pain. Max Daily Amount: 200 mg. Qty: 10 Tab, Refills: 0             Referenced discharge instructions provided by nursing for diet and activity.   Follow-up:   Follow-up Information     Follow up With Details Comments Contact Info    Izabela Garrison MD  to discuss long term treatment of anxiety [address]  [phone]      Lynne Castanon MD Schedule an appointment as soon as possible for a visit in 2 weeks  [address]  [id number]  [address] [phone]            Signed:  Lynne Castanon MD  8/13/2017  10:23 AM

## 2017-08-13 NOTE — PROGRESS NOTES
Cardiology Progress Note  2017     Admit Date: 8/10/2017  Admit Diagnosis: NSTEMI (non-ST elevated myocardial infarction) ([facility] [address]  CC: none currently  Cardiac Assessment/Plan:   CM: Echo: Ef 35-40%; ant/apical WMA; Mild-mod MR. No CAD at cath. Takotsubo CM: med Rx; cont bblocker/acei. HTN: stable. Rhythm: sinus    Volume status:euvolemic  Renal function: stable    Anxiety D/O: per PCP; short-term ativan prn. Dispo:  d/c. For other plans, see orders.   Hospital problem list   Active Hospital Problems    Diagnosis Date Noted    NSTEMI (non-ST elevated myocardial infarction) ([facility] [address] 2017    HTN (hypertension) 2017    History of permanent cardiac pacemaker placement 2017    GERD (gastroesophageal reflux disease) 2017    Essential hypertension, benign 2013        Subjective: Tandy Osler reports   Chest pain X none  consistent with:  Non-cardiac CP         Atypical CP     None now  On going  Anginal CP     Dyspnea  none  at rest  with exertion        x improved  unchanged  worse              PND X none  overnight       Orthopnea X none  improved  unchanged  worse   Presyncope X none  improved  unchanged  worse     Ambulated in hallway without symptoms  x Yes   Ambulated in room without symptoms x Yes   Objective:    Physical Exam:  Overall VSSAF;    Visit Vitals    /76    Pulse 61    Temp 98 °F (36.7 °C)    Resp 13    Ht 5' 5\" (1.651 m)    Wt 56.7 kg (125 lb)    SpO2 96%    Breastfeeding No    BMI 20.8 kg/m2     Temp (24hrs), Av.2 °F (36.8 °C), Min:98 °F (36.7 °C), Max:98.5 °F (36.9 °C)    Patient Vitals for the past 8 hrs:   Pulse   17 0636 61   17 0229 60    Patient Vitals for the past 8 hrs:   Resp   17 0636 13   17 0229 12    Patient Vitals for the past 8 hrs:   BP   17 0636 138/76   17 0229 119/62          Intake/Output Summary (Last 24 hours) at 17 1018  Last data filed at 17 1818   Gross per 24 hour   Intake              240 ml   Output                0 ml   Net              240 ml     General Appearance: Well developed, well nourished, no acute distress. Ears/Nose/Mouth/Throat:   Normal MM; anicteric. JVP: WNL   Resp:   clear to auscultation bilaterally. Nl resp effort. Cardiovascular:  RRR, S1, S2 normal, no new murmur. No gallop or rub. Abdomen:   Soft, non-tender, bowel sounds are present. Extremities: No edema bilaterally. Skin:  Neuro: Warm and dry. A/O x3, grossly nonfocal   cath site intact w/o hematoma or new bruit; distal pulse unchanged x Yes   Data Review:     Telemetry independently reviewed x sinus x paced  parox afib  NSVT     ECG independently reviewed  NSR  afib  no significant changes  NSST-Tw chgs   x no new ECG provided for review   Lab results reviewed as noted below. Current medications reviewed as noted below. No results for input(s): PH, PCO2, PO2 in the last 72 hours. Recent Labs      08/11/17 0253  08/10/17   2256   CKMB  14.3*   --    CKNDX  6.5*   --    TROIQ  7.55*  2.84*     Recent Labs      08/12/17   0524  08/10/17   2256   NA  138  138   K  4.6  3.1*   CL  107  106   CO2  27  24   BUN  14  16   CREA  0.86  0.93   GLU  95  167*   CA  8.7  8.4*   ALB  3.0*  3.3*   WBC   --   9.0   HGB   --   12.4   HCT   --   36.2   PLT   --   283     Lab Results   Component Value Date/Time    Cholesterol, total 166 08/12/2017 05:24 AM    HDL Cholesterol 54 08/12/2017 05:24 AM    LDL, calculated 82.4 08/12/2017 05:24 AM    Triglyceride 148 08/12/2017 05:24 AM    CHOL/HDL Ratio 3.1 08/12/2017 05:24 AM     Recent Labs      08/12/17   0524  08/10/17   2256   SGOT  26  19   AP  74  90   TP  6.4  6.9   ALB  3.0*  3.3*   GLOB  3.4  3.6     No results for input(s): INR, PTP, APTT in the last 72 hours.     No lab exists for component: INREXT, INREXT   No components found for: [place]    Current Facility-Administered Medications   Medication Dose Route Frequency    levothyroxine (SYNTHROID) tablet 100 mcg  100 mcg Oral ACB    [START ON 8/14/2017] metoprolol succinate (TOPROL-XL) XL tablet 25 mg  25 mg Oral DAILY    [START ON 8/14/2017] aspirin chewable tablet 81 mg  81 mg Oral DAILY    pravastatin (PRAVACHOL) tablet 10 mg  10 mg Oral QHS    LORazepam (ATIVAN) tablet 0.5 mg  0.5 mg Oral Q6H PRN    pantoprazole (PROTONIX) tablet 40 mg  40 mg Oral ACB    traMADol (ULTRAM) tablet 50 mg  50 mg Oral Q6H PRN    morphine injection 2 mg  2 mg IntraVENous Q3H PRN    sodium chloride (NS) flush 5-10 mL  5-10 mL IntraVENous Q8H    sodium chloride (NS) flush 5-10 mL  5-10 mL IntraVENous PRN    acetaminophen (TYLENOL) tablet 650 mg  650 mg Oral Q6H PRN    ondansetron (ZOFRAN) injection 4 mg  4 mg IntraVENous Q4H PRN    nitroglycerin (NITROSTAT) tablet 0.4 mg  0.4 mg SubLINGual PRN    sodium chloride (NS) flush 5-10 mL  5-10 mL IntraVENous Q8H    sodium chloride (NS) flush 5-10 mL  5-10 mL IntraVENous PRN    naloxone (NARCAN) injection 0.4 mg  0.4 mg IntraVENous PRN    diphenhydrAMINE (BENADRYL) injection 25 mg  25 mg IntraVENous Q4H PRN    enalapril (VASOTEC) tablet 10 mg  10 mg Oral DAILY        Marion Garcia MD

## 2017-08-13 NOTE — PROGRESS NOTES
Pt and  given d/c instructions. Removed Iv and telemetry.  Pt taken by WC to main entrance for d/c home

## 2017-09-06 ENCOUNTER — HOSPITAL ENCOUNTER (OUTPATIENT)
Dept: CARDIAC REHAB | Age: 77
Discharge: HOME | End: 2017-09-06
Payer: MEDICARE

## 2017-09-06 VITALS — BODY MASS INDEX: 26.5 KG/M2 | WEIGHT: 144 LBS | HEIGHT: 62 IN

## 2017-09-06 DIAGNOSIS — I21.4 NSTEMI (NON-ST ELEVATED MYOCARDIAL INFARCTION) (HCC): ICD-10-CM

## 2017-09-06 PROCEDURE — 93798 PHYS/QHP OP CAR RHAB W/ECG: CPT

## 2017-09-06 RX ORDER — LANOLIN ALCOHOL/MO/W.PET/CERES
1000 CREAM (GRAM) TOPICAL DAILY
COMMUNITY
End: 2022-02-22

## 2017-09-06 RX ORDER — MELATONIN
DAILY
COMMUNITY
End: 2022-02-22

## 2017-09-06 NOTE — CARDIO/PULMONARY
Cardiopulmonary Rehab Nursing Entry:    Pt presented for Cardiac Rehab Orientation    Pt reports she had a stress-induced heart attack. Her  had a ruptured varicose vein that bled all-over her house and needed emergent care. She developed chest pain in response to his emergency and was brought to [address] [facility] with NSTEMI, Takotsubo CM. Additional PMHx of dyslipidemia, HTN, hypothyroid disease, s/p PPM.  EF 35-40% as of 8/11/2017. Allergies and medication list were reviewed. Pt does not take flu or PNA vaccines due to side effects. She has never smoked, has a weekly glass of wine or less and does not use any drugs. She has a supportive , reports stress of raising her grandson. Has supportive additional family and friends and does adult coloring books and reads to cope. She walks her dog twice a day for 30 minutes all-together and keeps busy doing housework. She tries to avoid greasy foods. 5'2\"  144lbs  BMI 26.39  w-36\", h-38\"  Paced  BS CTA  No pedal edema  No cough reported  100% RA  170/87 (L)  180/91(R)  59    Pt completed 6 minute walk test on the treadmill. She completed 655ft, 199.6m without stops or adverse symptoms. Goals are as follows: To lose weight to a goal of 120lbs. To increase her knowledge of heart disease. To walk on a treadmill and establish home exercise routine. Pt viewed DVD, is scheduled for her 6 weeks of classes and exercise sessions.

## 2017-09-11 ENCOUNTER — HOSPITAL ENCOUNTER (OUTPATIENT)
Dept: CARDIAC REHAB | Age: 77
Discharge: HOME | End: 2017-09-11
Payer: MEDICARE

## 2017-09-11 VITALS — BODY MASS INDEX: 26.34 KG/M2 | WEIGHT: 144 LBS

## 2017-09-11 DIAGNOSIS — I21.4 NSTEMI (NON-ST ELEVATED MYOCARDIAL INFARCTION) (HCC): ICD-10-CM

## 2017-09-11 PROCEDURE — 93798 PHYS/QHP OP CAR RHAB W/ECG: CPT

## 2017-09-11 PROCEDURE — 93797 PHYS/QHP OP CAR RHAB WO ECG: CPT

## 2017-09-13 ENCOUNTER — HOSPITAL ENCOUNTER (OUTPATIENT)
Dept: CARDIAC REHAB | Age: 77
Discharge: HOME | End: 2017-09-13
Payer: MEDICARE

## 2017-09-13 VITALS — BODY MASS INDEX: 26.34 KG/M2 | WEIGHT: 144 LBS

## 2017-09-13 DIAGNOSIS — I21.4 NSTEMI (NON-ST ELEVATED MYOCARDIAL INFARCTION) (HCC): ICD-10-CM

## 2017-09-13 PROCEDURE — 93798 PHYS/QHP OP CAR RHAB W/ECG: CPT

## 2017-09-18 ENCOUNTER — APPOINTMENT (OUTPATIENT)
Dept: CARDIAC REHAB | Age: 77
End: 2017-09-18
Attending: INTERNAL MEDICINE
Payer: MEDICARE

## 2017-09-18 ENCOUNTER — APPOINTMENT (OUTPATIENT)
Dept: CARDIAC REHAB | Age: 77
End: 2017-09-18
Payer: MEDICARE

## 2017-09-20 ENCOUNTER — HOSPITAL ENCOUNTER (OUTPATIENT)
Dept: CARDIAC REHAB | Age: 77
Discharge: HOME | End: 2017-09-20
Payer: MEDICARE

## 2017-09-20 VITALS — BODY MASS INDEX: 26.34 KG/M2 | WEIGHT: 144 LBS

## 2017-09-20 DIAGNOSIS — I21.4 NSTEMI (NON-ST ELEVATED MYOCARDIAL INFARCTION) (HCC): ICD-10-CM

## 2017-09-20 PROCEDURE — 93798 PHYS/QHP OP CAR RHAB W/ECG: CPT

## 2017-09-25 ENCOUNTER — APPOINTMENT (OUTPATIENT)
Dept: CARDIAC REHAB | Age: 77
End: 2017-09-25
Payer: MEDICARE

## 2017-09-25 ENCOUNTER — HOSPITAL ENCOUNTER (OUTPATIENT)
Dept: CARDIAC REHAB | Age: 77
Discharge: HOME | End: 2017-09-25
Attending: INTERNAL MEDICINE
Payer: MEDICARE

## 2017-09-25 VITALS — WEIGHT: 143 LBS | BODY MASS INDEX: 26.16 KG/M2

## 2017-09-25 PROCEDURE — 93798 PHYS/QHP OP CAR RHAB W/ECG: CPT

## 2017-09-27 ENCOUNTER — HOSPITAL ENCOUNTER (OUTPATIENT)
Dept: MAMMOGRAPHY | Age: 77
Discharge: HOME | End: 2017-09-27
Attending: OBSTETRICS & GYNECOLOGY
Payer: MEDICARE

## 2017-09-27 DIAGNOSIS — Z12.31 VISIT FOR SCREENING MAMMOGRAM: ICD-10-CM

## 2017-09-27 PROCEDURE — 77067 SCR MAMMO BI INCL CAD: CPT

## 2017-10-02 ENCOUNTER — HOSPITAL ENCOUNTER (OUTPATIENT)
Dept: CARDIAC REHAB | Age: 77
Discharge: HOME | End: 2017-10-02
Attending: INTERNAL MEDICINE
Payer: MEDICARE

## 2017-10-02 ENCOUNTER — APPOINTMENT (OUTPATIENT)
Dept: CARDIAC REHAB | Age: 77
End: 2017-10-02
Payer: MEDICARE

## 2017-10-02 VITALS — BODY MASS INDEX: 26.23 KG/M2 | WEIGHT: 143.4 LBS

## 2017-10-02 PROCEDURE — 93798 PHYS/QHP OP CAR RHAB W/ECG: CPT

## 2017-10-04 ENCOUNTER — HOSPITAL ENCOUNTER (OUTPATIENT)
Dept: CARDIAC REHAB | Age: 77
Discharge: HOME | End: 2017-10-04
Payer: MEDICARE

## 2017-10-09 ENCOUNTER — HOSPITAL ENCOUNTER (OUTPATIENT)
Dept: CARDIAC REHAB | Age: 77
Discharge: HOME | End: 2017-10-09
Attending: INTERNAL MEDICINE
Payer: MEDICARE

## 2017-10-09 ENCOUNTER — APPOINTMENT (OUTPATIENT)
Dept: CARDIAC REHAB | Age: 77
End: 2017-10-09
Payer: MEDICARE

## 2017-10-09 VITALS — BODY MASS INDEX: 26.26 KG/M2 | WEIGHT: 143.6 LBS

## 2017-10-09 PROCEDURE — 93798 PHYS/QHP OP CAR RHAB W/ECG: CPT

## 2017-10-11 ENCOUNTER — HOSPITAL ENCOUNTER (OUTPATIENT)
Dept: CARDIAC REHAB | Age: 77
Discharge: HOME | End: 2017-10-11
Payer: MEDICARE

## 2017-10-16 ENCOUNTER — HOSPITAL ENCOUNTER (OUTPATIENT)
Dept: CARDIAC REHAB | Age: 77
Discharge: HOME | End: 2017-10-16
Attending: INTERNAL MEDICINE
Payer: MEDICARE

## 2017-10-16 ENCOUNTER — APPOINTMENT (OUTPATIENT)
Dept: CARDIAC REHAB | Age: 77
End: 2017-10-16
Payer: MEDICARE

## 2017-10-16 NOTE — CARDIO/PULMONARY
Pt called in to cancel cardiac rehab session today as her arthritis is bothering her. Appts adjusted in computer.

## 2017-11-13 ENCOUNTER — HOSPITAL ENCOUNTER (OUTPATIENT)
Dept: CARDIAC REHAB | Age: 77
Discharge: HOME | End: 2017-11-13
Attending: INTERNAL MEDICINE

## 2017-11-13 NOTE — CARDIO/PULMONARY
CP REHAB NOTE    Called patient about her missed appointments. She has a history of arthritis and it has been especially bad with the changing of seasons. She said she can't predict when she will have a good day and feels she can not continue with rehab. Patient saw Dr. Lesa Florez recently and he said she was looking good and her Echo looked good.      Patient removed from the program.

## 2017-11-15 ENCOUNTER — APPOINTMENT (OUTPATIENT)
Dept: CARDIAC REHAB | Age: 77
End: 2017-11-15

## 2017-11-20 ENCOUNTER — APPOINTMENT (OUTPATIENT)
Dept: CARDIAC REHAB | Age: 77
End: 2017-11-20
Attending: INTERNAL MEDICINE

## 2017-11-22 ENCOUNTER — APPOINTMENT (OUTPATIENT)
Dept: CARDIAC REHAB | Age: 77
End: 2017-11-22

## 2017-11-27 ENCOUNTER — APPOINTMENT (OUTPATIENT)
Dept: CARDIAC REHAB | Age: 77
End: 2017-11-27
Attending: INTERNAL MEDICINE

## 2017-11-29 ENCOUNTER — APPOINTMENT (OUTPATIENT)
Dept: CARDIAC REHAB | Age: 77
End: 2017-11-29

## 2017-12-04 ENCOUNTER — APPOINTMENT (OUTPATIENT)
Dept: CARDIAC REHAB | Age: 77
End: 2017-12-04
Attending: INTERNAL MEDICINE

## 2017-12-06 ENCOUNTER — APPOINTMENT (OUTPATIENT)
Dept: CARDIAC REHAB | Age: 77
End: 2017-12-06

## 2017-12-11 ENCOUNTER — APPOINTMENT (OUTPATIENT)
Dept: CARDIAC REHAB | Age: 77
End: 2017-12-11
Attending: INTERNAL MEDICINE

## 2017-12-13 ENCOUNTER — APPOINTMENT (OUTPATIENT)
Dept: CARDIAC REHAB | Age: 77
End: 2017-12-13

## 2017-12-18 ENCOUNTER — APPOINTMENT (OUTPATIENT)
Dept: CARDIAC REHAB | Age: 77
End: 2017-12-18
Attending: INTERNAL MEDICINE

## 2017-12-20 ENCOUNTER — APPOINTMENT (OUTPATIENT)
Dept: CARDIAC REHAB | Age: 77
End: 2017-12-20

## 2017-12-27 ENCOUNTER — APPOINTMENT (OUTPATIENT)
Dept: CARDIAC REHAB | Age: 77
End: 2017-12-27

## 2018-01-03 ENCOUNTER — APPOINTMENT (OUTPATIENT)
Dept: CARDIAC REHAB | Age: 78
End: 2018-01-03

## 2019-01-07 ENCOUNTER — HOSPITAL ENCOUNTER (OUTPATIENT)
Dept: MAMMOGRAPHY | Age: 79
Discharge: HOME | End: 2019-01-07
Attending: OBSTETRICS & GYNECOLOGY
Payer: MEDICARE

## 2019-01-07 DIAGNOSIS — Z12.39 BREAST SCREENING: ICD-10-CM

## 2019-01-07 PROCEDURE — 77067 SCR MAMMO BI INCL CAD: CPT

## 2020-01-14 ENCOUNTER — HOSPITAL ENCOUNTER (OUTPATIENT)
Dept: MAMMOGRAPHY | Age: 80
Discharge: HOME | End: 2020-01-14
Attending: OBSTETRICS & GYNECOLOGY
Payer: MEDICARE

## 2020-01-14 DIAGNOSIS — Z12.31 VISIT FOR SCREENING MAMMOGRAM: ICD-10-CM

## 2020-01-14 PROCEDURE — 77067 SCR MAMMO BI INCL CAD: CPT

## 2020-12-24 ENCOUNTER — TRANSCRIBE ORDER (OUTPATIENT)
Dept: SCHEDULING | Age: 80
End: 2020-12-24

## 2020-12-24 DIAGNOSIS — Z12.31 SCREENING MAMMOGRAM FOR HIGH-RISK PATIENT: Primary | ICD-10-CM

## 2021-02-24 ENCOUNTER — HOSPITAL ENCOUNTER (OUTPATIENT)
Dept: MAMMOGRAPHY | Age: 81
Discharge: HOME | End: 2021-02-24
Attending: OBSTETRICS & GYNECOLOGY
Payer: MEDICARE

## 2021-02-24 DIAGNOSIS — Z12.31 SCREENING MAMMOGRAM FOR HIGH-RISK PATIENT: ICD-10-CM

## 2021-02-24 PROCEDURE — 77067 SCR MAMMO BI INCL CAD: CPT

## 2022-02-03 ENCOUNTER — TRANSCRIBE ORDER (OUTPATIENT)
Dept: SCHEDULING | Age: 82
End: 2022-02-03

## 2022-02-03 DIAGNOSIS — Z12.31 VISIT FOR SCREENING MAMMOGRAM: Primary | ICD-10-CM

## 2022-02-22 ENCOUNTER — HOSPITAL ENCOUNTER (OUTPATIENT)
Dept: PREADMISSION TESTING | Age: 82
Discharge: HOME | End: 2022-02-22
Attending: ORTHOPAEDIC SURGERY
Payer: MEDICARE

## 2022-02-22 VITALS
TEMPERATURE: 97.6 F | OXYGEN SATURATION: 99 % | WEIGHT: 135.58 LBS | BODY MASS INDEX: 24.95 KG/M2 | DIASTOLIC BLOOD PRESSURE: 70 MMHG | HEART RATE: 60 BPM | SYSTOLIC BLOOD PRESSURE: 162 MMHG | RESPIRATION RATE: 15 BRPM | HEIGHT: 62 IN

## 2022-02-22 DIAGNOSIS — R73.09 ELEVATED HEMOGLOBIN A1C: Primary | ICD-10-CM

## 2022-02-22 LAB
ABO + RH BLD: NORMAL
ALBUMIN SERPL-MCNC: 3.7 G/DL (ref 3.5–5)
ALBUMIN/GLOB SERPL: 1.1 {RATIO} (ref 1.1–2.2)
ALP SERPL-CCNC: 91 U/L (ref 45–117)
ALT SERPL-CCNC: 23 U/L (ref 12–78)
ANION GAP SERPL CALC-SCNC: 4 MMOL/L (ref 5–15)
APPEARANCE UR: CLEAR
AST SERPL-CCNC: 23 U/L (ref 15–37)
BACTERIA URNS QL MICRO: NEGATIVE /HPF
BILIRUB SERPL-MCNC: 0.5 MG/DL (ref 0.2–1)
BILIRUB UR QL: NEGATIVE
BLOOD GROUP ANTIBODIES SERPL: NORMAL
BUN SERPL-MCNC: 13 MG/DL (ref 6–20)
BUN/CREAT SERPL: 17 (ref 12–20)
CALCIUM SERPL-MCNC: 8.9 MG/DL (ref 8.5–10.1)
CHLORIDE SERPL-SCNC: 104 MMOL/L (ref 97–108)
CO2 SERPL-SCNC: 29 MMOL/L (ref 21–32)
COLOR UR: NORMAL
CREAT SERPL-MCNC: 0.77 MG/DL (ref 0.55–1.02)
EPITH CASTS URNS QL MICRO: NORMAL /LPF
ERYTHROCYTE [DISTWIDTH] IN BLOOD BY AUTOMATED COUNT: 13.4 % (ref 11.5–14.5)
EST. AVERAGE GLUCOSE BLD GHB EST-MCNC: 146 MG/DL
GLOBULIN SER CALC-MCNC: 3.4 G/DL (ref 2–4)
GLUCOSE SERPL-MCNC: 95 MG/DL (ref 65–100)
GLUCOSE UR STRIP.AUTO-MCNC: NEGATIVE MG/DL
HBA1C MFR BLD: 6.7 % (ref 4–5.6)
HCT VFR BLD AUTO: 35.5 % (ref 35–47)
HGB BLD-MCNC: 11.7 G/DL (ref 11.5–16)
HGB UR QL STRIP: NEGATIVE
HYALINE CASTS URNS QL MICRO: NORMAL /LPF (ref 0–5)
INR PPP: 1.1 (ref 0.9–1.1)
KETONES UR QL STRIP.AUTO: NEGATIVE MG/DL
LEUKOCYTE ESTERASE UR QL STRIP.AUTO: NEGATIVE
MCH RBC QN AUTO: 31.4 PG (ref 26–34)
MCHC RBC AUTO-ENTMCNC: 33 G/DL (ref 30–36.5)
MCV RBC AUTO: 95.2 FL (ref 80–99)
NITRITE UR QL STRIP.AUTO: NEGATIVE
NRBC # BLD: 0 K/UL (ref 0–0.01)
NRBC BLD-RTO: 0 PER 100 WBC
PH UR STRIP: 6 [PH] (ref 5–8)
PLATELET # BLD AUTO: 363 K/UL (ref 150–400)
PMV BLD AUTO: 9.7 FL (ref 8.9–12.9)
POTASSIUM SERPL-SCNC: 3.8 MMOL/L (ref 3.5–5.1)
PROT SERPL-MCNC: 7.1 G/DL (ref 6.4–8.2)
PROT UR STRIP-MCNC: NEGATIVE MG/DL
PROTHROMBIN TIME: 11 SEC (ref 9–11.1)
RBC # BLD AUTO: 3.73 M/UL (ref 3.8–5.2)
RBC #/AREA URNS HPF: NORMAL /HPF (ref 0–5)
SODIUM SERPL-SCNC: 137 MMOL/L (ref 136–145)
SP GR UR REFRACTOMETRY: 1.01 (ref 1–1.03)
SPECIMEN EXP DATE BLD: NORMAL
UA: UC IF INDICATED,UAUC: NORMAL
UROBILINOGEN UR QL STRIP.AUTO: 0.2 EU/DL (ref 0.2–1)
WBC # BLD AUTO: 5.7 K/UL (ref 3.6–11)
WBC URNS QL MICRO: NORMAL /HPF (ref 0–4)

## 2022-02-22 PROCEDURE — 85610 PROTHROMBIN TIME: CPT

## 2022-02-22 PROCEDURE — 81001 URINALYSIS AUTO W/SCOPE: CPT

## 2022-02-22 PROCEDURE — 36415 COLL VENOUS BLD VENIPUNCTURE: CPT

## 2022-02-22 PROCEDURE — 86900 BLOOD TYPING SEROLOGIC ABO: CPT

## 2022-02-22 PROCEDURE — 80053 COMPREHEN METABOLIC PANEL: CPT

## 2022-02-22 PROCEDURE — 83036 HEMOGLOBIN GLYCOSYLATED A1C: CPT

## 2022-02-22 PROCEDURE — 85027 COMPLETE CBC AUTOMATED: CPT

## 2022-02-22 RX ORDER — FLUTICASONE FUROATE 27.5 UG/1
2 SPRAY, METERED NASAL DAILY
COMMUNITY

## 2022-02-22 RX ORDER — DICLOFENAC SODIUM 75 MG/1
75 TABLET, DELAYED RELEASE ORAL AS NEEDED
COMMUNITY
End: 2022-03-04

## 2022-02-22 RX ORDER — ENALAPRIL MALEATE 20 MG/1
20 TABLET ORAL 2 TIMES DAILY
COMMUNITY

## 2022-02-22 RX ORDER — METOPROLOL TARTRATE 50 MG/1
50 TABLET ORAL 2 TIMES DAILY
COMMUNITY

## 2022-02-22 RX ORDER — VITAMIN E 1000 UNIT
1000 CAPSULE ORAL DAILY
COMMUNITY

## 2022-02-22 RX ORDER — OMEPRAZOLE 40 MG/1
40 CAPSULE, DELAYED RELEASE ORAL DAILY
COMMUNITY

## 2022-02-22 RX ORDER — SODIUM CHLORIDE, SODIUM LACTATE, POTASSIUM CHLORIDE, CALCIUM CHLORIDE 600; 310; 30; 20 MG/100ML; MG/100ML; MG/100ML; MG/100ML
25 INJECTION, SOLUTION INTRAVENOUS CONTINUOUS
Status: CANCELLED | OUTPATIENT
Start: 2022-03-03

## 2022-02-22 RX ORDER — MINERAL OIL
180 ENEMA (ML) RECTAL DAILY
COMMUNITY

## 2022-02-22 RX ORDER — AMLODIPINE BESYLATE 2.5 MG/1
2.5 TABLET ORAL EVERY EVENING
COMMUNITY

## 2022-02-22 RX ORDER — VITAMIN B COMPLEX
2500 TABLET ORAL DAILY
COMMUNITY

## 2022-02-22 RX ORDER — PREGABALIN 75 MG/1
75 CAPSULE ORAL ONCE
Status: CANCELLED | OUTPATIENT
Start: 2022-03-03 | End: 2022-03-03

## 2022-02-22 RX ORDER — LEVOTHYROXINE SODIUM 112 UG/1
TABLET ORAL
COMMUNITY

## 2022-02-22 RX ORDER — CHOLECALCIFEROL (VITAMIN D3) 125 MCG
CAPSULE ORAL 2 TIMES DAILY
COMMUNITY

## 2022-02-22 RX ORDER — ACETAMINOPHEN 500 MG
1000 TABLET ORAL ONCE
Status: CANCELLED | OUTPATIENT
Start: 2022-03-03 | End: 2022-03-03

## 2022-02-22 RX ORDER — ACETAMINOPHEN 325 MG/1
650 TABLET ORAL
COMMUNITY

## 2022-02-22 RX ORDER — CELECOXIB 200 MG/1
400 CAPSULE ORAL ONCE
Status: CANCELLED | OUTPATIENT
Start: 2022-03-03 | End: 2022-03-03

## 2022-02-22 RX ORDER — LANOLIN ALCOHOL/MO/W.PET/CERES
400 CREAM (GRAM) TOPICAL DAILY
COMMUNITY

## 2022-02-22 NOTE — PERIOP NOTES
Hibiclens/Chlorhexidine    Preventing Infections Before and After  Your Surgery    IMPORTANT INSTRUCTIONS    Please read and follow these instructions carefully. If you are unable to comply with the below instructions your procedure will be cancelled. Every Night for Three (3) nights before your surgery:  1. Shower with an antibacterial soap, such as Dial, or the soap provided at your preassessment appointment. A shower is better than a bath for cleaning your skin. 2. If needed, ask someone to help you reach all areas of your body. Dont forget to clean your belly button with every shower. The night before your surgery: If you lose your Hibiclens/chlorhexidine please contact surgery center or you can purchase it at a local pharmacy  1. On the night before your surgery, shower with an antibacterial soap, such as Dial, or the soap provided at your preassessment appointment. 2. With one packet of Hibiclens/Chlorhexidine in hand, turn water off.  3. Apply Hibiclens antiseptic skin cleanser with a clean, freshly washed washcloth. ? Gently apply to your body from chin to toes (except the genital area) and especially the area(s) where your incision(s) will be. ? Leave Hibiclens/Chlorhexidine on your skin for at least 20 seconds. CAUTION: If needed, Hibiclens/chlorhexidine may be used to clean the folds of skin of the legs (such as in the area of the groin) and on your buttocks and hips. However, do not use Hibiclens/Chlorhexidine above the neck or in the genital area (your bottom) or put inside any area of your body. 4. Turn the water back on and rinse. 5. Dry gently with a clean, freshly washed towel. 6. After your shower, do not use any powder, deodorant, perfumes or lotion. 7. Use clean, freshly washed towels and washcloths every time you shower. 8. Wear clean, freshly washed pajamas to bed the night before surgery. 9. Sleep on clean, freshly washed sheets.   10. Do not allow pets to sleep in your bed with you. The Morning of your surgery:  1. Shower again thoroughly with an antibacterial soap, such as Dial or the soap provided at your preassessment appointment. If needed, ask someone for help to reach all areas of your body. Dont forget to clean your belly button! Rinse. 2. Dry gently with a clean, freshly washed towel. 3. After your shower, do not use any powder, deodorant, perfumes or lotion prior to surgery. 4. Put on clean, freshly washed clothing. Tips to help prevent infections after your surgery:  1. Protect your surgical wound from germs:  ? Hand washing is the most important thing you and your caregivers can do to prevent infections. ? Keep your bandage clean and dry! ? Do not touch your surgical wound. 2. Use clean, freshly washed towels and washcloths every time you shower; do not share bath linens with others. 3. Until your surgical wound is healed, wear clothing and sleep on bed linens each day that are clean and freshly washed. 4. Do not allow pets to sleep in your bed with you or touch your surgical wound. 5. Do not smoke  smoking delays wound healing. This may be a good time to stop smoking. 6. If you have diabetes, it is important for you to manage your blood sugar levels properly before your surgery as well as after your surgery. Poorly managed blood sugar levels slow down wound healing and prevent you from healing completely. If you lose your Hibiclens/chlorhexidine, please call the [facility], or it is available for purchase at your pharmacy.                ___________________      ___________________      ________________  (Signature of Patient)          (Witness)                   (Date and Time)

## 2022-02-22 NOTE — PERIOP NOTES
Incentive Spirometer        Using the incentive spirometer helps expand the small air sacs of your lungs, helps you breathe deeply, and helps improve your lung function. Use your incentive spirometer twice a day (10 breaths each time) prior to surgery. How to Use Your Incentive Spirometer:  1. Hold the incentive spirometer in an upright position. 2. Breathe out as usual.   3. Place the mouthpiece in your mouth and seal your lips tightly around it. 4. Take a deep breath. Breathe in slowly and as deeply as possible. Keep the blue flow rate guide between the arrows. 5. Hold your breath as long as possible. Then exhale slowly and allow the piston to fall to the bottom of the column. 6. Rest for a few seconds and repeat steps one through five at least 10 times. PAT Tidal Volume____1500______________  x______2__________  Date_____2/22/22__________________    Brea Ngoc THE INCENTIVE SPIROMETER WITH YOU TO THE HOSPITAL ON THE DAY OF YOUR SURGERY. Opportunity given to ask and answer questions as well as to observe return demonstration.     Patient signature_____________________________    Witness____________________________

## 2022-02-22 NOTE — PERIOP NOTES
The [address] \"Your Path to a More Active Life\" orthopedic total knee or total hip educational video and the [facility] patient handbook provided & reviewed during the patients pre-admission testing (PAT) appointment. An opportunity for questions was provided, patient verbalized understanding. EKG with office notes dated 2/9/22 and stress test dated 2/21/22 received from Dr. Zenaida Colbert. Copy on paper chart. COVID test results dated 1/20/22 received from [facility] ([facility]). Copy on paper chart.

## 2022-02-22 NOTE — PERIOP NOTES
Orthopedic and Spine Patients: Instructions on When You Can   Eat or Drink Before Surgery      You have been provided 2 pre-surgery drinks received at your pre-admission testing appointment.  Night before surgery:  o You should drink one bottle of the  pre-surgery drink at bedtime. o No food after midnight!  Day of Surgery:  o Complete 2nd bottle of the pre-surgery drink 1 hour prior to arrival at hospital.      For questions call Pre-Admission Testing at [phone]. They are available from 8:00am-5:00pm, Monday through Friday.

## 2022-02-22 NOTE — PERIOP NOTES
[facility]  Joint/Spine Preoperative Instructions        Surgery Date 3/3/22         Time of Arrival:  To Be Called  Contact # [phone]    1. On the day of your surgery, please report to the Surgical Services Registration Desk and sign in at your designated time. The Surgery Center is located to the right of the Emergency Room. 2. You must have someone with you to drive you home. You should not drive a car for 24 hours following surgery. Please make arrangements for a friend or family member to stay with you for the first 24 hours after your surgery. 3. No food after midnight 3/2/22. Medications morning of surgery should be taken with a sip of water. Please follow pre-surgery drink instructions that were given at your Pre Admission Testing appointment. 4. We recommend you do not drink any alcoholic beverages for 24 hours before and after your surgery. 5. Contact your surgeons office for instructions on the following medications: non-steroidal anti-inflammatory drugs (i.e. Advil, Aleve), vitamins, and supplements. (Some surgeons will want you to stop these medications prior to surgery and others may allow you to take them)  **If you are currently taking Plavix, Coumadin, Aspirin and/or other blood-thinning agents, contact your surgeon for instructions. ** Your surgeon will partner with the physician prescribing these medications to determine if it is safe to stop or if you need to continue taking. Please do not stop taking these medications without instructions from your surgeon    6. Wear comfortable clothes. Wear glasses instead of contacts. Do not bring any money or jewelry. Please bring picture ID, insurance card, and any prearranged co-payment or hospital payment. Do not wear make-up, particularly mascara the morning of your surgery. Do not wear nail polish, particularly if you are having foot /hand surgery.   Wear your hair loose or down, no ponytails, buns, tamar pins or clips. All body piercings must be removed. Please shower with antibacterial soap for three consecutive days before and on the morning of surgery, but do not apply any lotions, powders or deodorants after the shower on the day of surgery. Please use a fresh towels after each shower. Please sleep in clean clothes and change bed linens the night before surgery. Please do not shave for 48 hours prior to surgery. Shaving of the face is acceptable. 7. You should understand that if you do not follow these instructions your surgery may be cancelled. If your physical condition changes (I.e. fever, cold or flu) please contact your surgeon as soon as possible. 8. It is important that you be on time. If a situation occurs where you may be late, please call [phone] (OR Holding Area). 9. If you have any questions and or problems, please call [phone] (Pre-admission Testing). 10. Your surgery time may be subject to change. You will receive a phone call the evening prior if your time changes. 11.  If having outpatient surgery, you must have someone to drive you here, stay with you during the duration of your stay, and to drive you home at time of discharge. 12. The following link is for the educational video for patients and/or families. [email or web address].org/. [email or web address]/[email or web address]-xlbletc-[email or web address]/[email or web address]/[email or web address]/educational-materials    13  Due to current COVID restrictions, only ONE adult may accompany you the day of the procedure. We have limited seating available. If our waiting room is at capacity, your ride may be asked to remain in their vehicle. No children are allowed in the waiting room    Special Instructions: Use Incentive Spirometer 20 times daily prior to surgery. TAKE ALL MEDICATIONS THE DAY OF SURGERY EXCEPT:  Diclofenac      I understand a pre-operative phone call will be made to verify my surgery time.   In the event that I am not available, I give permission for a message to be left on my answering service and/or with another person?   yes         ___________________      __________   _________    (Signature of Patient)             (Witness)                (Date and Time)

## 2022-02-23 LAB
BACTERIA SPEC CULT: NORMAL
BACTERIA SPEC CULT: NORMAL
SERVICE CMNT-IMP: NORMAL

## 2022-02-23 NOTE — ADVANCED PRACTICE NURSE
PAT Nurse Practitioner   Pre-Operative Chart Review/Assessment:-ORTHOPEDIC/NEUROSURGICAL SPINE                Patient Name:  Rochelle Mills                                                           Age:   80 y.o.    :  1940     Today's Date:  2022     Date of PAT:   22     Date of Surgery:    3/3/2022      Procedure(s):  Left  Total Hip Arthroplasty     Surgeon:   Alexsandra Garcia     Medical Clearance:  Anahi Solis PA-C                   PLAN:      1)  Cardiac Clearance:  Dr. Wyatt Knight        2)  Program for Diabetes Health Consult:  Ordered-A1C 6.7 w/ no current tx for T2DM      3)  Sleep Apnea evaluation:   RAMÍREZ 2 w/ no reported witnessed apnea while sleeping.   Reports hx of \"mild RAMÍREZ\" (per cardiology notes)       4) Treatment for MRSA/Staph Aureus:  Negative       5) Additional Concerns:  Takotsubo CM, SSS s/p PPM , SVT ablation , previous hx of \"mild RAMÍREZ,\" anxiety, + COVID 22, prediabetes, PONV                Vital Signs:         Visit Vitals  BP (!) 162/70 (BP 1 Location: Right arm, BP Patient Position: Sitting)   Pulse 60   Temp 97.6 °F (36.4 °C)   Resp 15   Ht 5' 2\" (1.575 m)   Wt 61.5 kg (135 lb 9.3 oz)   SpO2 99%   BMI 24.80 kg/m²                        ____________________________________________  PAST MEDICAL HISTORY  Past Medical History:   Diagnosis Date    Arrhythmia 56    HX TACHYCARDIA, TX ABLATION    Arthritis     BLE, spine, hands    COVID 2022    GERD (gastroesophageal reflux disease)     Hiatal hernia     Hypertension     Menopause     LMP-    Nausea & vomiting     per Dr. Efra Paz - gabe versed, N2O, ro volatile, Dexamethasone , Zofran, and Phergan used    Rheumatic fever     as a child    [facility] 2017    s/p PPM     Takotsubo cardiomyopathy 2017    Thyroid disease     HYPOTHYROID      ____________________________________________  PAST SURGICAL HISTORY  Past Surgical History:   Procedure Laterality Date    HX APPENDECTOMY      HX CATARACT REMOVAL Bilateral     HX  SECTION        x3, hysterectomy    HX CHOLECYSTECTOMY      HX HEENT      tonsillectomy    HX HYSTERECTOMY  1981    HX ORTHOPAEDIC      right knee orthoscopic, right wrist surgery    HX ORTHOPAEDIC Bilateral     finger surgery for arthritis and swelling    HX PACEMAKER Left 2017    NH CHEST SURGERY PROCEDURE UNLISTED Left 2017    Pacemaker Placed      ____________________________________________  HOME MEDICATIONS    Current Outpatient Medications   Medication Sig    acetaminophen (TYLENOL) 325 mg tablet Take 650 mg by mouth every four (4) hours as needed for Pain.  diclofenac EC (VOLTAREN) 75 mg EC tablet Take 75 mg by mouth as needed for Pain.  enalapril (VASOTEC) 20 mg tablet Take 20 mg by mouth two (2) times a day.  omeprazole (PRILOSEC) 40 mg capsule Take 40 mg by mouth daily.  metoprolol tartrate (LOPRESSOR) 50 mg tablet Take 50 mg by mouth two (2) times a day.  levothyroxine (SYNTHROID) 112 mcg tablet Take  by mouth six (6) days a week. Not on     amLODIPine (NORVASC) 2.5 mg tablet Take 2.5 mg by mouth every evening.  ascorbic acid, vitamin C, (Vitamin C) 1,000 mg tablet Take 1,000 mg by mouth daily.  fluticasone (Flonase Sensimist) 27.5 mcg/actuation nasal spray 2 Sprays by Nasal route daily.  folic acid 972 mcg tablet Take 400 mcg by mouth daily.  fexofenadine (ALLEGRA) 180 mg tablet Take 180 mg by mouth daily.  cyanocobalamin (Vitamin B-12) 2,500 mcg sublingual tablet Take 2,500 mcg by mouth daily.  cholecalciferol, vitamin D3, (Vitamin D3) 50 mcg (2,000 unit) tab Take  by mouth two (2) times a day.  zinc sulfate (ZINC-15 PO) Take  by mouth two (2) times a day.  potassium 99 mg tablet Take 99 mg by mouth daily.  aspirin 81 mg chewable tablet Take 1 Tab by mouth daily.  (Patient taking differently: Take 81 mg by mouth two (2) times a week.)    LORazepam (ATIVAN) 0.5 mg tablet Take 1 Tab by mouth every six (6) hours as needed. Max Daily Amount: 2 mg. Do not drive or drink alcohol if taking this medicine. No current facility-administered medications for this encounter.      ____________________________________________  ALLERGIES  Allergies   Allergen Reactions    Epinephrine Other (comments)     \"severe tachycardia\"     Mold Extracts Unknown (comments)    Morphine Nausea and Vomiting      ____________________________________________  SOCIAL HISTORY  Social History     Tobacco Use    Smoking status: Never Smoker    Smokeless tobacco: Never Used   Substance Use Topics    Alcohol use:  Yes     Alcohol/week: 3.0 standard drinks     Types: 2 Glasses of wine, 1 Shots of liquor per week      ____________________________________________  COVID VACCINATION STATUS:      Internal Administration   First Dose COVID-19, Pfizer Purple top, DILUTE for use, 12+ yrs, 30mcg/0.3mL dose  03/10/2021   Second Dose COVID-19, Pfizer Purple top, DILUTE for use, 12+ yrs, 30mcg/0.3mL dose  04/03/2021      Last COVID Lab No results found for: Violeta England, RCV2CT, CVD2M, Karjamey Linea, XPLCVT, [address], [id number], [address] Outpatient Visit on 02/22/2022   Component Date Value Ref Range Status    WBC 02/22/2022 5.7  3.6 - 11.0 K/uL Final    RBC 02/22/2022 3.73* 3.80 - 5.20 M/uL Final    HGB 02/22/2022 11.7  11.5 - 16.0 g/dL Final    HCT 02/22/2022 35.5  35.0 - 47.0 % Final    MCV 02/22/2022 95.2  80.0 - 99.0 FL Final    MCH 02/22/2022 31.4  26.0 - 34.0 PG Final    MCHC 02/22/2022 33.0  30.0 - 36.5 g/dL Final    RDW 02/22/2022 13.4  11.5 - 14.5 % Final    PLATELET 64/35/6441 622  150 - 400 K/uL Final    MPV 02/22/2022 9.7  8.9 - 12.9 FL Final    NRBC 02/22/2022 0.0  0  WBC Final    ABSOLUTE NRBC 02/22/2022 0.00  0.00 - 0.01 K/uL Final    Special Requests: 02/22/2022 NO SPECIAL REQUESTS    Final    Culture result: 02/22/2022 MRSA NOT PRESENT    Final    Culture result: 02/22/2022 Screening of patient nares for MRSA is for surveillance purposes and, if positive, to facilitate isolation considerations in high risk settings. It is not intended for automatic decolonization interventions per se as regimens are not sufficiently effective to warrant routine use. Final    Hemoglobin A1c 02/22/2022 6.7* 4.0 - 5.6 % Final    Comment: NEW METHOD  PLEASE NOTE NEW REFERENCE RANGE  (NOTE)  HbA1C Interpretive Ranges  <5.7              Normal  5.7 - 6.4         Consider Prediabetes  >6.5              Consider Diabetes      Est. average glucose 02/22/2022 146  mg/dL Final    INR 02/22/2022 1.1  0.9 - 1.1   Final    A single therapeutic range for Vit K antagonists may not be optimal for all indications - see June, 2008 issue of Chest, American College of Chest Physicians Evidence-Based Clinical Practice Guidelines, 8th Edition.  Prothrombin time 02/22/2022 11.0  9.0 - 11.1 sec Final    Color 02/22/2022 YELLOW/STRAW    Final    Color Reference Range: Straw, Yellow or Dark Yellow    Appearance 02/22/2022 CLEAR  CLEAR   Final    Specific gravity 02/22/2022 1.012  1.003 - 1.030   Final    pH (UA) 02/22/2022 6.0  5.0 - 8.0   Final    Protein 02/22/2022 Negative  NEG mg/dL Final    Glucose 02/22/2022 Negative  NEG mg/dL Final    Ketone 02/22/2022 Negative  NEG mg/dL Final    Bilirubin 02/22/2022 Negative  NEG   Final    Blood 02/22/2022 Negative  NEG   Final    Urobilinogen 02/22/2022 0.2  0.2 - 1.0 EU/dL Final    Nitrites 02/22/2022 Negative  NEG   Final    Leukocyte Esterase 02/22/2022 Negative  NEG   Final    WBC 02/22/2022 0-4  0 - 4 /hpf Final    RBC 02/22/2022 0-5  0 - 5 /hpf Final    Epithelial cells 02/22/2022 FEW  FEW /lpf Final    Epithelial cell category consists of squamous cells and /or transitional urothelial cells. Renal tubular cells, if present, are separately identified as such.     Bacteria 02/22/2022 Negative  NEG /hpf Final  UA:UC IF INDICATED 02/22/2022 CULTURE NOT INDICATED BY UA RESULT  CNI   Final    Hyaline cast 02/22/2022 0-2  0 - 5 /lpf Final    Sodium 02/22/2022 137  136 - 145 mmol/L Final    Potassium 02/22/2022 3.8  3.5 - 5.1 mmol/L Final    Chloride 02/22/2022 104  97 - 108 mmol/L Final    CO2 02/22/2022 29  21 - 32 mmol/L Final    Anion gap 02/22/2022 4* 5 - 15 mmol/L Final    Glucose 02/22/2022 95  65 - 100 mg/dL Final    BUN 02/22/2022 13  6 - 20 MG/DL Final    Creatinine 02/22/2022 0.77  0.55 - 1.02 MG/DL Final    BUN/Creatinine ratio 02/22/2022 17  12 - 20   Final    GFR est AA 02/22/2022 >60  >60 ml/min/1.73m2 Final    GFR est non-AA 02/22/2022 >60  >60 ml/min/1.73m2 Final    Estimated GFR is calculated using the IDMS-traceable Modification of Diet in Renal Disease (MDRD) Study equation, reported for both  Americans (GFRAA) and non- Americans (GFRNA), and normalized to 1.73m2 body surface area. The physician must decide which value applies to the patient.  Calcium 02/22/2022 8.9  8.5 - 10.1 MG/DL Final    Bilirubin, total 02/22/2022 0.5  0.2 - 1.0 MG/DL Final    ALT (SGPT) 02/22/2022 23  12 - 78 U/L Final    AST (SGOT) 02/22/2022 23  15 - 37 U/L Final    Alk. phosphatase 02/22/2022 91  45 - 117 U/L Final    Protein, total 02/22/2022 7.1  6.4 - 8.2 g/dL Final    Albumin 02/22/2022 3.7  3.5 - 5.0 g/dL Final    Globulin 02/22/2022 3.4  2.0 - 4.0 g/dL Final    A-G Ratio 02/22/2022 1.1  1.1 - 2.2   Final    Crossmatch Expiration 02/22/2022 03/06/2022,2359   Final    ABO/Rh(D) 02/22/2022 A POSITIVE   Final    Antibody screen 02/22/2022 NEG   Final        XR Results (most recent):    Results from Hospital Encounter encounter on 08/10/17    XR CHEST PORT    Narrative  INDICATION:  Chest Pain    COMPARISON: 6/30/2017    FINDINGS: Single AP portable view of the chest obtained at 2321 demonstrates a  stable cardiomediastinal silhouette. The lungs are clear bilaterally.  No osseous  abnormalities are seen. There is a left-sided pacer device. There is no  pneumothorax. Impression  IMPRESSION: No evidence of acute cardiopulmonary process. Skin:   Denies open wounds, cuts, sores, rashes or other areas of concern in PAT assessment.         Gucci Gan, NP

## 2022-03-03 ENCOUNTER — HOSPITAL ENCOUNTER (INPATIENT)
Age: 82
LOS: 1 days | Discharge: HOME HEALTH | DRG: 470 | End: 2022-03-04
Attending: ORTHOPAEDIC SURGERY | Admitting: ORTHOPAEDIC SURGERY
Payer: MEDICARE

## 2022-03-03 ENCOUNTER — APPOINTMENT (OUTPATIENT)
Dept: GENERAL RADIOLOGY | Age: 82
DRG: 470 | End: 2022-03-03
Attending: ORTHOPAEDIC SURGERY
Payer: MEDICARE

## 2022-03-03 ENCOUNTER — ANESTHESIA (OUTPATIENT)
Dept: SURGERY | Age: 82
DRG: 470 | End: 2022-03-03
Payer: MEDICARE

## 2022-03-03 ENCOUNTER — ANESTHESIA EVENT (OUTPATIENT)
Dept: SURGERY | Age: 82
DRG: 470 | End: 2022-03-03
Payer: MEDICARE

## 2022-03-03 DIAGNOSIS — Z96.642 STATUS POST LEFT HIP REPLACEMENT: Primary | ICD-10-CM

## 2022-03-03 PROBLEM — Z96.649 S/P HIP REPLACEMENT: Status: ACTIVE | Noted: 2022-03-03

## 2022-03-03 PROCEDURE — 74011250637 HC RX REV CODE- 250/637: Performed by: ORTHOPAEDIC SURGERY

## 2022-03-03 PROCEDURE — 73502 X-RAY EXAM HIP UNI 2-3 VIEWS: CPT

## 2022-03-03 PROCEDURE — 0SRB0JZ REPLACEMENT OF LEFT HIP JOINT WITH SYNTHETIC SUBSTITUTE, OPEN APPROACH: ICD-10-PCS | Performed by: ORTHOPAEDIC SURGERY

## 2022-03-03 PROCEDURE — 77030003666 HC NDL SPINAL BD -A: Performed by: ORTHOPAEDIC SURGERY

## 2022-03-03 PROCEDURE — 74011000250 HC RX REV CODE- 250: Performed by: PHYSICIAN ASSISTANT

## 2022-03-03 PROCEDURE — 74011250637 HC RX REV CODE- 250/637: Performed by: PHYSICIAN ASSISTANT

## 2022-03-03 PROCEDURE — 77030036722: Performed by: ORTHOPAEDIC SURGERY

## 2022-03-03 PROCEDURE — 74011000272 HC RX REV CODE- 272: Performed by: ORTHOPAEDIC SURGERY

## 2022-03-03 PROCEDURE — 76060000033 HC ANESTHESIA 1 TO 1.5 HR: Performed by: ORTHOPAEDIC SURGERY

## 2022-03-03 PROCEDURE — 76210000000 HC OR PH I REC 2 TO 2.5 HR: Performed by: ORTHOPAEDIC SURGERY

## 2022-03-03 PROCEDURE — 2709999900 HC NON-CHARGEABLE SUPPLY: Performed by: ORTHOPAEDIC SURGERY

## 2022-03-03 PROCEDURE — 77030031139 HC SUT VCRL2 J&J -A: Performed by: ORTHOPAEDIC SURGERY

## 2022-03-03 PROCEDURE — 74011000250 HC RX REV CODE- 250: Performed by: ORTHOPAEDIC SURGERY

## 2022-03-03 PROCEDURE — 97535 SELF CARE MNGMENT TRAINING: CPT | Performed by: OCCUPATIONAL THERAPIST

## 2022-03-03 PROCEDURE — 77030008477 HC STYL SATN SLP COVD -A: Performed by: ANESTHESIOLOGY

## 2022-03-03 PROCEDURE — 77030008684 HC TU ET CUF COVD -B: Performed by: ANESTHESIOLOGY

## 2022-03-03 PROCEDURE — 76000 FLUOROSCOPY <1 HR PHYS/QHP: CPT

## 2022-03-03 PROCEDURE — 77030018723 HC ELCTRD BLD COVD -A: Performed by: ORTHOPAEDIC SURGERY

## 2022-03-03 PROCEDURE — 65270000029 HC RM PRIVATE

## 2022-03-03 PROCEDURE — 74011000250 HC RX REV CODE- 250: Performed by: NURSE ANESTHETIST, CERTIFIED REGISTERED

## 2022-03-03 PROCEDURE — 77030034479 HC ADH SKN CLSR PRINEO J&J -B: Performed by: ORTHOPAEDIC SURGERY

## 2022-03-03 PROCEDURE — 97530 THERAPEUTIC ACTIVITIES: CPT

## 2022-03-03 PROCEDURE — 97116 GAIT TRAINING THERAPY: CPT

## 2022-03-03 PROCEDURE — 77030035643 HC BLD SAW OSC PRECIS STRY -C: Performed by: ORTHOPAEDIC SURGERY

## 2022-03-03 PROCEDURE — 74011250636 HC RX REV CODE- 250/636: Performed by: ORTHOPAEDIC SURGERY

## 2022-03-03 PROCEDURE — 77030028271 HC SRGFL HEMSTAT MTRX KT J&J -C: Performed by: ORTHOPAEDIC SURGERY

## 2022-03-03 PROCEDURE — 74011250636 HC RX REV CODE- 250/636: Performed by: NURSE ANESTHETIST, CERTIFIED REGISTERED

## 2022-03-03 PROCEDURE — 76010000161 HC OR TIME 1 TO 1.5 HR INTENSV-TIER 1: Performed by: ORTHOPAEDIC SURGERY

## 2022-03-03 PROCEDURE — 74011250636 HC RX REV CODE- 250/636: Performed by: PHYSICIAN ASSISTANT

## 2022-03-03 PROCEDURE — C1776 JOINT DEVICE (IMPLANTABLE): HCPCS | Performed by: ORTHOPAEDIC SURGERY

## 2022-03-03 PROCEDURE — 97161 PT EVAL LOW COMPLEX 20 MIN: CPT

## 2022-03-03 PROCEDURE — 74011250636 HC RX REV CODE- 250/636: Performed by: ANESTHESIOLOGY

## 2022-03-03 PROCEDURE — 77030018673: Performed by: ORTHOPAEDIC SURGERY

## 2022-03-03 PROCEDURE — 77030035236 HC SUT PDS STRATFX BARB J&J -B: Performed by: ORTHOPAEDIC SURGERY

## 2022-03-03 PROCEDURE — 97165 OT EVAL LOW COMPLEX 30 MIN: CPT | Performed by: OCCUPATIONAL THERAPIST

## 2022-03-03 PROCEDURE — 74011250637 HC RX REV CODE- 250/637: Performed by: NURSE PRACTITIONER

## 2022-03-03 DEVICE — IMPLANTABLE DEVICE: Type: IMPLANTABLE DEVICE | Site: HIP | Status: FUNCTIONAL

## 2022-03-03 DEVICE — COMPONENT TOT HIP CAPPED H2 ADV: Type: IMPLANTABLE DEVICE | Status: FUNCTIONAL

## 2022-03-03 DEVICE — IMPLANTABLE DEVICE
Type: IMPLANTABLE DEVICE | Site: HIP | Status: FUNCTIONAL
Brand: EXACTECH

## 2022-03-03 DEVICE — ALTEON CUP
Type: IMPLANTABLE DEVICE | Site: HIP | Status: FUNCTIONAL
Brand: ALTEON

## 2022-03-03 DEVICE — IMPLANTABLE DEVICE
Type: IMPLANTABLE DEVICE | Site: HIP | Status: FUNCTIONAL
Brand: NOVATION

## 2022-03-03 RX ORDER — TRAMADOL HYDROCHLORIDE 50 MG/1
50 TABLET ORAL
Status: DISCONTINUED | OUTPATIENT
Start: 2022-03-03 | End: 2022-03-04 | Stop reason: HOSPADM

## 2022-03-03 RX ORDER — NALOXONE HYDROCHLORIDE 0.4 MG/ML
0.4 INJECTION, SOLUTION INTRAMUSCULAR; INTRAVENOUS; SUBCUTANEOUS AS NEEDED
Status: DISCONTINUED | OUTPATIENT
Start: 2022-03-03 | End: 2022-03-04 | Stop reason: HOSPADM

## 2022-03-03 RX ORDER — ROCURONIUM BROMIDE 10 MG/ML
INJECTION, SOLUTION INTRAVENOUS AS NEEDED
Status: DISCONTINUED | OUTPATIENT
Start: 2022-03-03 | End: 2022-03-03 | Stop reason: HOSPADM

## 2022-03-03 RX ORDER — ACETAMINOPHEN 500 MG
1000 TABLET ORAL ONCE
Status: COMPLETED | OUTPATIENT
Start: 2022-03-03 | End: 2022-03-03

## 2022-03-03 RX ORDER — SUCCINYLCHOLINE CHLORIDE 20 MG/ML
INJECTION INTRAMUSCULAR; INTRAVENOUS AS NEEDED
Status: DISCONTINUED | OUTPATIENT
Start: 2022-03-03 | End: 2022-03-03 | Stop reason: HOSPADM

## 2022-03-03 RX ORDER — SODIUM CHLORIDE 0.9 % (FLUSH) 0.9 %
5-40 SYRINGE (ML) INJECTION AS NEEDED
Status: DISCONTINUED | OUTPATIENT
Start: 2022-03-03 | End: 2022-03-04 | Stop reason: HOSPADM

## 2022-03-03 RX ORDER — PROPOFOL 10 MG/ML
INJECTION, EMULSION INTRAVENOUS AS NEEDED
Status: DISCONTINUED | OUTPATIENT
Start: 2022-03-03 | End: 2022-03-03 | Stop reason: HOSPADM

## 2022-03-03 RX ORDER — PREGABALIN 75 MG/1
75 CAPSULE ORAL ONCE
Status: COMPLETED | OUTPATIENT
Start: 2022-03-03 | End: 2022-03-03

## 2022-03-03 RX ORDER — FENTANYL CITRATE 50 UG/ML
25 INJECTION, SOLUTION INTRAMUSCULAR; INTRAVENOUS
Status: DISCONTINUED | OUTPATIENT
Start: 2022-03-03 | End: 2022-03-03 | Stop reason: HOSPADM

## 2022-03-03 RX ORDER — HYDROMORPHONE HYDROCHLORIDE 1 MG/ML
.2-.5 INJECTION, SOLUTION INTRAMUSCULAR; INTRAVENOUS; SUBCUTANEOUS
Status: DISCONTINUED | OUTPATIENT
Start: 2022-03-03 | End: 2022-03-03 | Stop reason: HOSPADM

## 2022-03-03 RX ORDER — CELECOXIB 200 MG/1
400 CAPSULE ORAL ONCE
Status: COMPLETED | OUTPATIENT
Start: 2022-03-03 | End: 2022-03-03

## 2022-03-03 RX ORDER — LIDOCAINE HYDROCHLORIDE 20 MG/ML
INJECTION, SOLUTION EPIDURAL; INFILTRATION; INTRACAUDAL; PERINEURAL AS NEEDED
Status: DISCONTINUED | OUTPATIENT
Start: 2022-03-03 | End: 2022-03-03 | Stop reason: HOSPADM

## 2022-03-03 RX ORDER — POLYETHYLENE GLYCOL 3350 17 G/17G
17 POWDER, FOR SOLUTION ORAL DAILY
Status: DISCONTINUED | OUTPATIENT
Start: 2022-03-03 | End: 2022-03-04 | Stop reason: HOSPADM

## 2022-03-03 RX ORDER — DIPHENHYDRAMINE HCL 12.5MG/5ML
12.5 LIQUID (ML) ORAL
Status: DISCONTINUED | OUTPATIENT
Start: 2022-03-03 | End: 2022-03-04 | Stop reason: HOSPADM

## 2022-03-03 RX ORDER — PANTOPRAZOLE SODIUM 40 MG/1
40 TABLET, DELAYED RELEASE ORAL
Status: DISCONTINUED | OUTPATIENT
Start: 2022-03-04 | End: 2022-03-04 | Stop reason: HOSPADM

## 2022-03-03 RX ORDER — OXYCODONE HYDROCHLORIDE 5 MG/1
5 TABLET ORAL
Status: DISCONTINUED | OUTPATIENT
Start: 2022-03-03 | End: 2022-03-03

## 2022-03-03 RX ORDER — ONDANSETRON 2 MG/ML
INJECTION INTRAMUSCULAR; INTRAVENOUS AS NEEDED
Status: DISCONTINUED | OUTPATIENT
Start: 2022-03-03 | End: 2022-03-03 | Stop reason: HOSPADM

## 2022-03-03 RX ORDER — FENTANYL CITRATE 50 UG/ML
INJECTION, SOLUTION INTRAMUSCULAR; INTRAVENOUS AS NEEDED
Status: DISCONTINUED | OUTPATIENT
Start: 2022-03-03 | End: 2022-03-03 | Stop reason: HOSPADM

## 2022-03-03 RX ORDER — ACETAMINOPHEN 325 MG/1
650 TABLET ORAL EVERY 6 HOURS
Status: DISCONTINUED | OUTPATIENT
Start: 2022-03-03 | End: 2022-03-04 | Stop reason: HOSPADM

## 2022-03-03 RX ORDER — TRAMADOL HYDROCHLORIDE 50 MG/1
50-100 TABLET ORAL
Qty: 30 TABLET | Refills: 0 | Status: SHIPPED | OUTPATIENT
Start: 2022-03-03 | End: 2022-03-10

## 2022-03-03 RX ORDER — ASPIRIN 81 MG/1
81 TABLET ORAL 2 TIMES DAILY
Qty: 60 TABLET | Refills: 0 | Status: SHIPPED | OUTPATIENT
Start: 2022-03-03 | End: 2022-04-02

## 2022-03-03 RX ORDER — ASPIRIN 81 MG/1
81 TABLET ORAL 2 TIMES DAILY
Status: DISCONTINUED | OUTPATIENT
Start: 2022-03-03 | End: 2022-03-04 | Stop reason: HOSPADM

## 2022-03-03 RX ORDER — SODIUM CHLORIDE 9 MG/ML
125 INJECTION, SOLUTION INTRAVENOUS CONTINUOUS
Status: DISPENSED | OUTPATIENT
Start: 2022-03-03 | End: 2022-03-04

## 2022-03-03 RX ORDER — DEXAMETHASONE SODIUM PHOSPHATE 4 MG/ML
10 INJECTION, SOLUTION INTRA-ARTICULAR; INTRALESIONAL; INTRAMUSCULAR; INTRAVENOUS; SOFT TISSUE ONCE
Status: COMPLETED | OUTPATIENT
Start: 2022-03-04 | End: 2022-03-04

## 2022-03-03 RX ORDER — ONDANSETRON 4 MG/1
4 TABLET, ORALLY DISINTEGRATING ORAL
Status: DISCONTINUED | OUTPATIENT
Start: 2022-03-05 | End: 2022-03-04 | Stop reason: HOSPADM

## 2022-03-03 RX ORDER — HYDROMORPHONE HYDROCHLORIDE 2 MG/ML
INJECTION, SOLUTION INTRAMUSCULAR; INTRAVENOUS; SUBCUTANEOUS AS NEEDED
Status: DISCONTINUED | OUTPATIENT
Start: 2022-03-03 | End: 2022-03-03 | Stop reason: HOSPADM

## 2022-03-03 RX ORDER — AMOXICILLIN 250 MG
1 CAPSULE ORAL 2 TIMES DAILY
Qty: 14 TABLET | Refills: 0 | Status: SHIPPED | OUTPATIENT
Start: 2022-03-03 | End: 2022-03-10

## 2022-03-03 RX ORDER — FACIAL-BODY WIPES
10 EACH TOPICAL DAILY PRN
Status: DISCONTINUED | OUTPATIENT
Start: 2022-03-05 | End: 2022-03-04 | Stop reason: HOSPADM

## 2022-03-03 RX ORDER — DEXAMETHASONE SODIUM PHOSPHATE 4 MG/ML
INJECTION, SOLUTION INTRA-ARTICULAR; INTRALESIONAL; INTRAMUSCULAR; INTRAVENOUS; SOFT TISSUE AS NEEDED
Status: DISCONTINUED | OUTPATIENT
Start: 2022-03-03 | End: 2022-03-03 | Stop reason: HOSPADM

## 2022-03-03 RX ORDER — TRAMADOL HYDROCHLORIDE 50 MG/1
100 TABLET ORAL
Status: DISCONTINUED | OUTPATIENT
Start: 2022-03-03 | End: 2022-03-04 | Stop reason: HOSPADM

## 2022-03-03 RX ORDER — ONDANSETRON 2 MG/ML
4 INJECTION INTRAMUSCULAR; INTRAVENOUS AS NEEDED
Status: DISCONTINUED | OUTPATIENT
Start: 2022-03-03 | End: 2022-03-03 | Stop reason: HOSPADM

## 2022-03-03 RX ORDER — HYDRALAZINE HYDROCHLORIDE 20 MG/ML
INJECTION INTRAMUSCULAR; INTRAVENOUS AS NEEDED
Status: DISCONTINUED | OUTPATIENT
Start: 2022-03-03 | End: 2022-03-03 | Stop reason: HOSPADM

## 2022-03-03 RX ORDER — LIDOCAINE HYDROCHLORIDE 10 MG/ML
0.1 INJECTION, SOLUTION EPIDURAL; INFILTRATION; INTRACAUDAL; PERINEURAL AS NEEDED
Status: DISCONTINUED | OUTPATIENT
Start: 2022-03-03 | End: 2022-03-03 | Stop reason: HOSPADM

## 2022-03-03 RX ORDER — NALOXONE HYDROCHLORIDE 4 MG/.1ML
SPRAY NASAL
Qty: 1 EACH | Refills: 0 | Status: SHIPPED | OUTPATIENT
Start: 2022-03-03

## 2022-03-03 RX ORDER — AMOXICILLIN 250 MG
1 CAPSULE ORAL 2 TIMES DAILY
Status: DISCONTINUED | OUTPATIENT
Start: 2022-03-03 | End: 2022-03-04 | Stop reason: HOSPADM

## 2022-03-03 RX ORDER — GLYCOPYRROLATE 0.2 MG/ML
INJECTION INTRAMUSCULAR; INTRAVENOUS AS NEEDED
Status: DISCONTINUED | OUTPATIENT
Start: 2022-03-03 | End: 2022-03-03 | Stop reason: HOSPADM

## 2022-03-03 RX ORDER — NEOSTIGMINE METHYLSULFATE 1 MG/ML
INJECTION, SOLUTION INTRAVENOUS AS NEEDED
Status: DISCONTINUED | OUTPATIENT
Start: 2022-03-03 | End: 2022-03-03 | Stop reason: HOSPADM

## 2022-03-03 RX ORDER — CELECOXIB 200 MG/1
200 CAPSULE ORAL 2 TIMES DAILY
Status: DISCONTINUED | OUTPATIENT
Start: 2022-03-03 | End: 2022-03-04 | Stop reason: HOSPADM

## 2022-03-03 RX ORDER — SODIUM CHLORIDE, SODIUM LACTATE, POTASSIUM CHLORIDE, CALCIUM CHLORIDE 600; 310; 30; 20 MG/100ML; MG/100ML; MG/100ML; MG/100ML
25 INJECTION, SOLUTION INTRAVENOUS CONTINUOUS
Status: DISCONTINUED | OUTPATIENT
Start: 2022-03-03 | End: 2022-03-03 | Stop reason: HOSPADM

## 2022-03-03 RX ORDER — LEVOTHYROXINE SODIUM 112 UG/1
112 TABLET ORAL
Status: DISCONTINUED | OUTPATIENT
Start: 2022-03-03 | End: 2022-03-04 | Stop reason: HOSPADM

## 2022-03-03 RX ORDER — CARVEDILOL 12.5 MG/1
25 TABLET ORAL 2 TIMES DAILY WITH MEALS
Status: DISCONTINUED | OUTPATIENT
Start: 2022-03-03 | End: 2022-03-04 | Stop reason: HOSPADM

## 2022-03-03 RX ORDER — CARVEDILOL 25 MG/1
25 TABLET ORAL 2 TIMES DAILY WITH MEALS
COMMUNITY

## 2022-03-03 RX ORDER — ONDANSETRON 2 MG/ML
4 INJECTION INTRAMUSCULAR; INTRAVENOUS
Status: DISPENSED | OUTPATIENT
Start: 2022-03-03 | End: 2022-03-04

## 2022-03-03 RX ORDER — OXYCODONE HYDROCHLORIDE 5 MG/1
10 TABLET ORAL
Status: DISCONTINUED | OUTPATIENT
Start: 2022-03-03 | End: 2022-03-03

## 2022-03-03 RX ORDER — SODIUM CHLORIDE 0.9 % (FLUSH) 0.9 %
5-40 SYRINGE (ML) INJECTION EVERY 8 HOURS
Status: DISCONTINUED | OUTPATIENT
Start: 2022-03-03 | End: 2022-03-04 | Stop reason: HOSPADM

## 2022-03-03 RX ORDER — ESMOLOL HYDROCHLORIDE 10 MG/ML
INJECTION INTRAVENOUS AS NEEDED
Status: DISCONTINUED | OUTPATIENT
Start: 2022-03-03 | End: 2022-03-03 | Stop reason: HOSPADM

## 2022-03-03 RX ORDER — MORPHINE SULFATE 2 MG/ML
2 INJECTION, SOLUTION INTRAMUSCULAR; INTRAVENOUS
Status: ACTIVE | OUTPATIENT
Start: 2022-03-03 | End: 2022-03-04

## 2022-03-03 RX ORDER — FENTANYL CITRATE 50 UG/ML
50 INJECTION, SOLUTION INTRAMUSCULAR; INTRAVENOUS AS NEEDED
Status: DISCONTINUED | OUTPATIENT
Start: 2022-03-03 | End: 2022-03-03 | Stop reason: HOSPADM

## 2022-03-03 RX ORDER — ROPIVACAINE HYDROCHLORIDE 5 MG/ML
INJECTION, SOLUTION EPIDURAL; INFILTRATION; PERINEURAL AS NEEDED
Status: DISCONTINUED | OUTPATIENT
Start: 2022-03-03 | End: 2022-03-03 | Stop reason: HOSPADM

## 2022-03-03 RX ORDER — POLYETHYLENE GLYCOL 3350 17 G/17G
17 POWDER, FOR SOLUTION ORAL DAILY
Qty: 7 PACKET | Refills: 0 | Status: SHIPPED | OUTPATIENT
Start: 2022-03-04 | End: 2022-03-11

## 2022-03-03 RX ADMIN — Medication 1 AMPULE: at 22:07

## 2022-03-03 RX ADMIN — LIDOCAINE HYDROCHLORIDE 100 MG: 20 INJECTION, SOLUTION INTRAVENOUS at 08:02

## 2022-03-03 RX ADMIN — SODIUM CHLORIDE 125 ML/HR: 0.9 INJECTION, SOLUTION INTRAVENOUS at 12:25

## 2022-03-03 RX ADMIN — FENTANYL CITRATE 50 MCG: 50 INJECTION, SOLUTION INTRAMUSCULAR; INTRAVENOUS at 08:00

## 2022-03-03 RX ADMIN — CELECOXIB 400 MG: 200 CAPSULE ORAL at 07:23

## 2022-03-03 RX ADMIN — ACETAMINOPHEN 325MG 650 MG: 325 TABLET ORAL at 18:11

## 2022-03-03 RX ADMIN — CARVEDILOL 25 MG: 12.5 TABLET, FILM COATED ORAL at 18:11

## 2022-03-03 RX ADMIN — GLYCOPYRROLATE 0.4 MG: 0.2 INJECTION, SOLUTION INTRAMUSCULAR; INTRAVENOUS at 08:53

## 2022-03-03 RX ADMIN — SODIUM CHLORIDE, PRESERVATIVE FREE 10 ML: 5 INJECTION INTRAVENOUS at 22:10

## 2022-03-03 RX ADMIN — TRAMADOL HYDROCHLORIDE 50 MG: 50 TABLET, COATED ORAL at 18:11

## 2022-03-03 RX ADMIN — HYDROMORPHONE HYDROCHLORIDE 0.4 MG: 2 INJECTION, SOLUTION INTRAMUSCULAR; INTRAVENOUS; SUBCUTANEOUS at 08:34

## 2022-03-03 RX ADMIN — HYDROMORPHONE HYDROCHLORIDE 0.4 MG: 2 INJECTION, SOLUTION INTRAMUSCULAR; INTRAVENOUS; SUBCUTANEOUS at 08:29

## 2022-03-03 RX ADMIN — HYDROMORPHONE HYDROCHLORIDE 0.4 MG: 2 INJECTION, SOLUTION INTRAMUSCULAR; INTRAVENOUS; SUBCUTANEOUS at 08:40

## 2022-03-03 RX ADMIN — HYDRALAZINE HYDROCHLORIDE 20 MG: 20 INJECTION INTRAMUSCULAR; INTRAVENOUS at 08:41

## 2022-03-03 RX ADMIN — SODIUM CHLORIDE, PRESERVATIVE FREE 10 ML: 5 INJECTION INTRAVENOUS at 13:24

## 2022-03-03 RX ADMIN — ONDANSETRON 4 MG: 2 INJECTION INTRAMUSCULAR; INTRAVENOUS at 18:26

## 2022-03-03 RX ADMIN — SODIUM CHLORIDE, POTASSIUM CHLORIDE, SODIUM LACTATE AND CALCIUM CHLORIDE 25 ML/HR: 600; 310; 30; 20 INJECTION, SOLUTION INTRAVENOUS at 07:36

## 2022-03-03 RX ADMIN — ROCURONIUM BROMIDE 20 MG: 10 INJECTION INTRAVENOUS at 08:13

## 2022-03-03 RX ADMIN — SUCCINYLCHOLINE CHLORIDE 100 MG: 20 INJECTION, SOLUTION INTRAMUSCULAR; INTRAVENOUS at 08:02

## 2022-03-03 RX ADMIN — DOCUSATE SODIUM 50MG AND SENNOSIDES 8.6MG 1 TABLET: 8.6; 5 TABLET, FILM COATED ORAL at 18:10

## 2022-03-03 RX ADMIN — PREGABALIN 75 MG: 75 CAPSULE ORAL at 07:23

## 2022-03-03 RX ADMIN — TRANEXAMIC ACID 1 G: 100 INJECTION, SOLUTION INTRAVENOUS at 08:33

## 2022-03-03 RX ADMIN — Medication 1000 MG: at 07:23

## 2022-03-03 RX ADMIN — ROCURONIUM BROMIDE 5 MG: 10 INJECTION INTRAVENOUS at 08:02

## 2022-03-03 RX ADMIN — CEFAZOLIN SODIUM 2 G: 1 INJECTION, POWDER, FOR SOLUTION INTRAMUSCULAR; INTRAVENOUS at 18:13

## 2022-03-03 RX ADMIN — CELECOXIB 200 MG: 200 CAPSULE ORAL at 18:11

## 2022-03-03 RX ADMIN — WATER 2 G: 1 INJECTION INTRAMUSCULAR; INTRAVENOUS; SUBCUTANEOUS at 08:02

## 2022-03-03 RX ADMIN — DEXAMETHASONE SODIUM PHOSPHATE 4 MG: 4 INJECTION, SOLUTION INTRAMUSCULAR; INTRAVENOUS at 08:48

## 2022-03-03 RX ADMIN — ESMOLOL HYDROCHLORIDE 30 MG: 10 INJECTION, SOLUTION INTRAVENOUS at 08:21

## 2022-03-03 RX ADMIN — ONDANSETRON HYDROCHLORIDE 4 MG: 2 INJECTION, SOLUTION INTRAMUSCULAR; INTRAVENOUS at 08:48

## 2022-03-03 RX ADMIN — Medication 3 AMPULE: at 07:22

## 2022-03-03 RX ADMIN — Medication 2 MG: at 08:53

## 2022-03-03 RX ADMIN — PROPOFOL 100 MG: 10 INJECTION, EMULSION INTRAVENOUS at 08:21

## 2022-03-03 RX ADMIN — PROPOFOL 150 MCG/KG/MIN: 10 INJECTION, EMULSION INTRAVENOUS at 08:06

## 2022-03-03 RX ADMIN — PROPOFOL 150 MG: 10 INJECTION, EMULSION INTRAVENOUS at 08:02

## 2022-03-03 RX ADMIN — ONDANSETRON 4 MG: 2 INJECTION INTRAMUSCULAR; INTRAVENOUS at 12:24

## 2022-03-03 RX ADMIN — ASPIRIN 81 MG: 81 TABLET, COATED ORAL at 18:11

## 2022-03-03 NOTE — PROGRESS NOTES
Problem: Self Care Deficits Care Plan (Adult)  Goal: *Acute Goals and Plan of Care (Insert Text)  Description: FUNCTIONAL STATUS PRIOR TO ADMISSION: Pt lives with her  in a tri-level home. There is a lower level entrance without steps. Pt reports independence in adls and did not use any DME nor AE during adls. HOME SUPPORT: The patient lived with . .    Occupational Therapy Goals  Initiated 3/3/2022     1. Patient will perform lower body dressing using adaptive aids, prn at supervision/set-up level within 7 days. 2. Patient will perform toilet transfers at supervision/set-up level using best technique and appropriate DME  within 7 days. 3. Patient will perform all aspects of toileting at independence level within 7 days. 4. Patient will verbalize and demonstrate knowledge of anterior  hip precautions without cues within 7 days. 5. Patient will perform standing adls for at least 6 minutes within 7 days. Outcome: Not Met   OCCUPATIONAL THERAPY EVALUATION  Patient: Zita France (26 y.o. female)  Date: 3/3/2022  Primary Diagnosis: S/P hip replacement [Z96.649]  Procedure(s) (LRB):  LEFT TOTAL HIP ARTHROPLASTY  ANTERIOR APPROACH WITH NAVIGATION (Left) Day of Surgery   Precautions: fall, anterior hip precautions       ASSESSMENT  Based on the objective data described below, the patient presents with generally controlled pain, complaints of intermitent nausea and lightheadedness during adl mobility, decreased balance, generalized weakness and decreased tolerance for adls on POD 0, impairing independence and safety. Pt is functioning well below her reported independent baseline and will benefit from acute OT services. Pt is planning discharge home with her supportive . .    Current Level of Function Impacting Discharge (ADLs/self-care): up to max A for adls on POD 0    Functional Outcome Measure:   The patient scored 50/100 on the Barthel Index outcome measure which is indicative of partially dependent adl performance. Other factors to consider for discharge: hx of cardiac issues--cleared for surgery by Sonya Salinas MD.  Pt reports that her cardiac meds were recently changed (last week),  Pt has PPM     Patient will benefit from skilled therapy intervention to address the above noted impairments. PLAN :  Recommendations and Planned Interventions: self care training, functional mobility training, therapeutic exercise, balance training, therapeutic activities, endurance activities, patient education, home safety training, and family training/education    Frequency/Duration: Patient will be followed by occupational therapy 5 times a week to address goals. Recommendation for discharge: (in order for the patient to meet his/her long term goals)  Occupational therapy at least 2 days/week in the home AND ensure assist and/or supervision for safety with adls and mobility    This discharge recommendation:  Has been made in collaboration with the attending provider and/or case management    IF patient discharges home will need the following DME: tbd  will likely need RW, possibly a tub bench       SUBJECTIVE:   Patient stated Is that sanitized? Nani Fischer   (referring to bathroom and tray table)    OBJECTIVE DATA SUMMARY:   HISTORY:   Past Medical History:   Diagnosis Date    Arrhythmia     HX TACHYCARDIA, [place] ABLATION    Arthritis     BLE, spine, hands    COVID 2022    GERD (gastroesophageal reflux disease)     Hiatal hernia     Hypertension     Menopause     LMP-    Nausea & vomiting     per Dr. Lazaro Tse - no versed, N2O, ro volatile, Dexamethasone , Zofran, and Phergan used    Rheumatic fever     as a child    [facility] 2017    s/p PPM     Takotsubo cardiomyopathy 2017    Thyroid disease     HYPOTHYROID     Past Surgical History:   Procedure Laterality Date    HX APPENDECTOMY      HX CATARACT REMOVAL Bilateral     HX  SECTION        x3, hysterectomy    HX CHOLECYSTECTOMY      HX HEENT      tonsillectomy    HX HYSTERECTOMY  1981    HX ORTHOPAEDIC      right knee orthoscopic, right wrist surgery    HX ORTHOPAEDIC Bilateral     finger surgery for arthritis and swelling    HX PACEMAKER Left 06/03/2017    [place] CHEST SURGERY PROCEDURE UNLISTED Left 2017    Pacemaker Placed       Expanded or extensive additional review of patient history:     Home Situation  Home Environment: Private residence (tri level home)  # Steps to Enter: 4 (3 steps down to den; 10 steps to bedroom)  Rails to [employer]:  (0 steps to lower level where pt can stay)  One/Two Story Residence:  (tri level)  Living Alone: No  Support Systems: Spouse/Significant Other  Patient Expects to be Discharged to[de-identified] Home with home health  Current DME Used/Available at Home:  (heart monitor)  Tub or Shower Type: Tub/Shower combination    Hand dominance: Right    EXAMINATION OF PERFORMANCE DEFICITS:  Cognitive/Behavioral Status:  Neurologic State: Alert;Drowsy  Orientation Level: Appropriate for age  Cognition: Follows commands  Perception: Appears intact  Perseveration: No perseveration noted  Safety/Judgement: Decreased awareness of need for assistance;Decreased awareness of need for safety; Fall prevention    Skin: generally intact, appears fragile. Surgical dressing intact    Edema: hand joints appear edematous due to pt reported arthritis (has hx hand finger surgeries)      Hearing: Auditory  Auditory Impairment: None  Hearing Aids/Status: Does not own    Vision/Perceptual:                           Acuity: Within Defined Limits (had cataract surgery 2 years ago/not formally assessed)         Range of Motion:  BUEs:  within functional limits                            Strength:  Generally decreased, functional                    Coordination:     Fine Motor Skills-Upper: Left Intact; Right Intact (arthritic joint changes)    Gross Motor Skills-Upper: Left Intact; Right Intact    Tone & Sensation:   Tone is normal  Sensation: pt reports intact                            Balance:  Sitting: Impaired  Sitting - Static: Fair (occasional)  Sitting - Dynamic:  (not assessed, able to lean forward to stand)  Standing: Impaired; With support  Standing - Static: Constant support; Fair  Standing - Dynamic : Constant support; Fair    Functional Mobility and Transfers for ADLs:  Bed Mobility:  Supine to Sit: Moderate assistance  Scooting: Supervision; Additional time    Transfers:  Sit to Stand: Contact guard assistance  Stand to Sit: Contact guard assistance; Additional time (cues for safe technique)  Bathroom Mobility: Minimum assistance (assist X2 for safety additional time)  Toilet Transfer : Minimum assistance; Additional time;Assist x2 (X2 for safety)  Assistive Device : Walker, rolling    ADL Assessment:    Feeding: Independent (lilmited intake at this time pod 0; pt nauseated)    Oral Facial Hygiene/Grooming: Setup (seated--poor tolerance for standing at this time-hand wipes)    Bathing: Maximum assistance    Upper Body Dressing: Minimum assistance    Lower Body Dressing: Maximum assistance    Toileting: Contact guard assistance (performed in 1/2 standing at toilet with support)                ADL Intervention and task modifications:  Pt educated on plan of care on POD 0. Pt had been quite sleepy post surgery. She was alert during tx session, VSS on room air, however, pt c/o intermittent nausea and feeling lightheaded during mobility. This decreased at rest.  Pt able to ambulate slowly into bathroom using RW, slolwy and with cues for safe technique. Tolerance for adls at this time is poor. Cognitive Retraining  Safety/Judgement: Decreased awareness of need for assistance;Decreased awareness of need for safety; Fall prevention    .     Therapeutic Exercise:  Encouraged deep breathing    Functional Measure:    Barthel Index:  Bathin  Bladder: 10  Bowels: 10  Groomin  Dressing: 5  Feedin  Mobility: 0  Stairs: 0  Toilet Use: 5  Transfer (Bed to Chair and Back): 10  Total: 50/100      The Barthel ADL Index: Guidelines  1. The index should be used as a record of what a patient does, not as a record of what a patient could do. 2. The main aim is to establish degree of independence from any help, physical or verbal, however minor and for whatever reason. 3. The need for supervision renders the patient not independent. 4. A patient's performance should be established using the best available evidence. Asking the patient, friends/relatives and nurses are the usual sources, but direct observation and common sense are also important. However direct testing is not needed. 5. Usually the patient's performance over the preceding 24-48 hours is important, but occasionally longer periods will be relevant. 6. Middle categories imply that the patient supplies over 50 per cent of the effort. 7. Use of aids to be independent is allowed. Score Interpretation (from [address] [address])    Independent   60-79 Minimally independent   40-59 Partially dependent   20-39 Very dependent   <20 Totally dependent     -Brooke Fairchild., Barthel, D.W. (1965). Functional evaluation: the Barthel Index. [address] [facility] ([address] (). The Barthel activities of daily living index: self-reporting versus actual performance in the old (> or = 75 years). Journal of [address] [place] [address] 45(7), [facility], .RachidRachidF, Julita Pelayo., Noemy Lush. (1999). Measuring the change in disability after inpatient rehabilitation; comparison of the responsiveness of the Barthel Index and Functional [place] Measure. Journal of Neurology, Neurosurgery, and Psychiatry, 66(4), 325-839. Kamryn Marques, N.J.A, STAR Castro.GERALD, & Cely Moreno M.A. (2004) Assessment of post-stroke quality of life in cost-effectiveness studies:  The usefulness of the Barthel Index and the EuroQoL-5D. Quality of Life Research, 15, 760-00        Occupational Therapy Evaluation Charge Determination   History Examination Decision-Making   MEDIUM Complexity : Expanded review of history including physical, cognitive and psychosocial  history  MEDIUM Complexity : 3-5 performance deficits relating to physical, cognitive , or psychosocial skils that result in activity limitations and / or participation restrictions MEDIUM Complexity : Patient may present with comorbidities that affect occupational performnce. Miniml to moderate modification of tasks or assistance (eg, physical or verbal ) with assesment(s) is necessary to enable patient to complete evaluation       Based on the above components, the patient evaluation is determined to be of the following complexity level: MEDIUM  Pain Rating:  Reported 0 at rest and up to 4 during adl mobility    Activity Tolerance:   Poor, SpO2 stable on RA, requires rest breaks, and BP  stable   Patient Vitals for the past 4 hrs:   Temp Pulse Resp BP SpO2           03/03/22 1605 -- 86 -- (!) 160/75 bed level post activity 99 %   03/03/22 1556 -- 72 -- 139/70  sitting post activity 100 %   03/03/22 1541 -- -- -- 138/77 sitting 100 %   03/03/22 1535 -- 92 -- (!) 140/72 supine 100 %                After treatment patient left in no apparent distress:    Supine in bed, Call bell within reach, Caregiver / family present, Side rails x 3, and nurse informed    COMMUNICATION/EDUCATION:   The patients plan of care was discussed with: Physical therapist, Registered nurse, and NP . Patient/family have participated as able in goal setting and plan of care. This patients plan of care is appropriate for delegation to [facility].     Thank you for this referral.  Stella Encarnacion OTR/L  Time Calculation: 44 mins

## 2022-03-03 NOTE — OP NOTES
Savana Ash MD - Adult Reconstruction and Total Joint Replacement    Ekaterina Julien - MRN [id number] - : 1940 (80 y.o.)      Date: 3/3/2022    Pre-operative Diagnosis: Left Hip DJD     Post-operative Diagnosis: same     Procedure:  (1) Left Total Hip Arthroplasty, Direct Anterior Approach    (2) Intraoperative Fluoroscopy    (3) Imageless Computer Navigation     Implants Used:   Implant Name Type Inv. Item Serial No.  Lot No. LRB No. Used Action   SHELL ACET MH 50 MM HIP GRP 4 CUP ALTEON - [id number]  SHELL ACET MH 50 MM HIP GRP 4 CUP [employer]TEON [id number] [employer] N/A Left 1 Implanted   LINER ACET NEUT 36X50 MM HIP GRP 4 XLE ALTEON - [id number]  LINER ACET NEUT 36X50 MM HIP GRP 4 XLE [employer]TE[employer] [id number] [employer] N/A Left 1 Implanted   STEM FEM OD11MM 12 14 STD OFFSET CLLRD TAPR REDUC NK MALIKA - [id number]  STEM FEM OD11MM 12 14 STD OFFSET CLLRD TAPR REDUC NK MALIKA [id number] [employer] N/A Left 1 Implanted   HEAD FEM +0MM [id number] ALUM OXIDE ZIRCONIA STRONTIUM OXIDE - [id number]  HEAD FEM +0MM [id number] ALUM OXIDE ZIRCONIA STRONTIUM OXIDE [id number] [employer] N/A Left 1 Implanted       Anesthesia: GETA + local    Pre-operative antibiotic: Ancef    Surgeon: Savana Ash     Assist: KIM Castro (Performing all or most of the following assistant-at-surgery services including but not limited to: proper patient positioning, sterile/prep and draping, placement of instruments/trackers, operative exposure, minor portions of bone / soft tissue excision, final irrigation and debridement, deep and superficial closure, application of final dressings)    EBL: 275cc     Drains: none     Specimens: none     Complications: none     Condition: stable to PACU     Brief History: Longstanding hip pain, unresponsive to conservative treatment. The risks, benefits, and alternatives to total hip replacement were thoroughly explained.  The patient understood no guarantees could be given about the outcome of the procedure and wished to proceed. Description of Procedure: After being identified in the preoperative holding area and having their operative site marked, the patient was brought back to the operating room where they underwent anesthesia to good effect. They were  placed in the supine position with a bump under the hip. The operative extremity was then prepped and draped in the usual fashion using sterile technique. Preoperative antibiotics had been administered. An appropriate time-out was performed. We began by placing the pelvic tracker with two percutaneous Shanz pins into the ipsilateral ilium. The pelvis was then registered with the navigation system. An incision was made 2 fingerbreadths lateral and distal to the ASIS. The skin flaps were developed down to the tensor fascia. This was divided sharply. Blunt dissection was taken medially over the tensor muscle. Retractors were placed superior and inferior to the femoral neck. The anterior fat pad was debrided. Circumflex vessels were identified and cauterized. A provisional neck cut was performed. The head was removed from the acetabulum. We then excised the labrum. We sequentially reamed for the acetabular shell. This was placed in the appropriate abduction and version. Position was confirmed by navigation and fluoroscopy . The liner was then impacted into the locking mechanism. We then turned our attention to the femur. Elevators were used to gain access to the proximal femur. Soft tissue releases were performed as necessary. The lateralizer was utilized. We then sequentially broached up to the final size trial. We placed a trial neck and head and had excellent restoration of leg length and offset with good soft tissue balance. We selected these as our final implants. Final components were inserted and the hip was reduced after thorough lavage. Restoration of appropriate leg length was confirmed by navigation and fluoroscopy. We again irrigated.  The tensor fascia was closed. Periarticular injection was performed. Skin closure was performed in layers. A sterile dressing was applied. The patient was awakened, moved to the stretcher, and taken to the recovery room in stable condition. At the conclusion of the procedure, all counts were correct. There were no immediate complications. Additional Procedure:   Date: 3/3/2022    Preoperative diagnosis: LEFT HIP OA    Postoperative diagnosis: LEFT HIP OA    Procedure performed: Procedure(s):  LEFT TOTAL HIP ARTHROPLASTY  ANTERIOR APPROACH WITH NAVIGATION    Anesthesia: General    Surgeon(s) and Role:     Berto Lopez MD - Primary      This describes the use of intraoperative fluoroscopy. Fluoroscopic imaging was utilized in orthogonal planes as well as using live technique for all phases of the procedure, described separately in the operative report, including hardware placement.     Christen Fernández MD

## 2022-03-03 NOTE — ANESTHESIA POSTPROCEDURE EVALUATION
Procedure(s):  LEFT TOTAL HIP ARTHROPLASTY  ANTERIOR APPROACH WITH NAVIGATION. general    Anesthesia Post Evaluation      Multimodal analgesia: multimodal analgesia used between 6 hours prior to anesthesia start to PACU discharge  Patient location during evaluation: PACU  Patient participation: complete - patient participated  Level of consciousness: sleepy but conscious and responsive to verbal stimuli  Pain score: 2  Pain management: adequate  Airway patency: patent  Anesthetic complications: no  Cardiovascular status: acceptable  Respiratory status: acceptable  Hydration status: acceptable  Comments: +Post-Anesthesia Evaluation and Assessment    Patient: Stalin Meadows MRN: [id number]  SSN: [id number]   YOB: 1940  Age: 80 y.o. Sex: female      Cardiovascular Function/Vital Signs    BP (!) 108/52   Pulse 85   Temp 36.4 °C (97.5 °F)   Resp 19   Ht 5' 2\" (1.575 m)   Wt 60.6 kg (133 lb 9.6 oz)   SpO2 98%   BMI 24.44 kg/m²     Patient is status post Procedure(s):  LEFT TOTAL HIP ARTHROPLASTY  ANTERIOR APPROACH WITH NAVIGATION. Nausea/Vomiting: Controlled. Postoperative hydration reviewed and adequate. Pain:  Pain Scale 1: Numeric (0 - 10) (03/03/22 0700)  Pain Intensity 1: 0 (03/03/22 0700)   Managed. Neurological Status:   Neuro (WDL): Within Defined Limits (03/03/22 0658)   At baseline. Mental Status and Level of Consciousness: Arousable. Pulmonary Status:   O2 Device: Nasal cannula (03/03/22 0918)   Adequate oxygenation and airway patent. Complications related to anesthesia: None    Post-anesthesia assessment completed. No concerns.     Signed By: Herber Garcia MD    3/3/2022  Post anesthesia nausea and vomiting:  controlled  Final Post Anesthesia Temperature Assessment:  Normothermia (36.0-37.5 degrees C)      INITIAL Post-op Vital signs:   Vitals Value Taken Time   /52 03/03/22 0925   Temp     Pulse 81 03/03/22 0927   Resp 20 03/03/22 0927   SpO2 98 % 03/03/22 8640   Vitals shown include unvalidated device data.

## 2022-03-03 NOTE — H&P
Rock Jonas MD - Adult Reconstruction and Total Joint Replacement     Orthopaedic History and Physical        NAME: Butch Valladares       :  1940       MRN:  [id number]        Subjective:   Patient ID: Butch Valladares is a 80 y.o. female. Chief Complaint: Follow-up of the Left Hip    Pleasant 68-year-old female coming in today for left hip. We have been taking care of her for a few years. She has had several hip injections and tried medications. Currently failing injections in diclofenac. She has significant left hip pain without any radicular symptoms that is bothering her at rest and limiting her weight-bearing activities.  She has started to walk with a limp  Has a pacemaker      Patient Active Problem List    Diagnosis Date Noted    S/P hip replacement 2022    NSTEMI (non-ST elevated myocardial infarction) ([email or web address] 2017    HTN (hypertension) 2017    History of permanent cardiac pacemaker placement 2017    GERD (gastroesophageal reflux disease) 2017    Pacemaker lead malfunction 2017    Hypomagnesemia 2013    Diverticulitis of colon (without mention of hemorrhage)(562.11) 2013    Hypokalemia 2013     Past Medical History:   Diagnosis Date    Arrhythmia     HX TACHYCARDIA, TX ABLATION    Arthritis     BLE, spine, hands    COVID 2022    GERD (gastroesophageal reflux disease)     Hiatal hernia     Hypertension     Menopause     LMP-    Nausea & vomiting     per Dr. Morgan Scruggs - no versed, N2O, ro volatile, Dexamethasone , Zofran, and Phergan used    Rheumatic fever     as a child    SSS (sick sinus syndrome) ([email or web address] 75.) 2017    s/p PPM     Takotsubo cardiomyopathy 2017    Thyroid disease     HYPOTHYROID      Past Surgical History:   Procedure Laterality Date    HX APPENDECTOMY      HX CATARACT REMOVAL Bilateral     HX  SECTION        x3, hysterectomy    HX CHOLECYSTECTOMY      HX HEENT      tonsillectomy  HX HYSTERECTOMY  1981    HX ORTHOPAEDIC      right knee orthoscopic, right wrist surgery    HX ORTHOPAEDIC Bilateral     finger surgery for arthritis and swelling    HX PACEMAKER Left 06/03/2017    [facility]LISTED Left 2017    Pacemaker Placed      Prior to Admission medications    Medication Sig Start Date End Date Taking? Authorizing Provider   carvediloL (Coreg) 25 mg tablet Take 25 mg by mouth two (2) times daily (with meals). Yes Provider, Historical   acetaminophen (TYLENOL) 325 mg tablet Take 650 mg by mouth every four (4) hours as needed for Pain. Yes Provider, Historical   diclofenac EC (VOLTAREN) 75 mg EC tablet Take 75 mg by mouth as needed for Pain. Yes Provider, Historical   enalapril (VASOTEC) 20 mg tablet Take 20 mg by mouth two (2) times a day. Yes Provider, Historical   omeprazole (PRILOSEC) 40 mg capsule Take 40 mg by mouth daily. Yes Provider, Historical   levothyroxine (SYNTHROID) 112 mcg tablet Take  by mouth six (6) days a week. Not on Sunday   Yes Provider, Historical   amLODIPine (NORVASC) 2.5 mg tablet Take 2.5 mg by mouth every evening. Yes Provider, Historical   ascorbic acid, vitamin C, (Vitamin C) 1,000 mg tablet Take 1,000 mg by mouth daily. Yes Provider, Historical   folic acid 954 mcg tablet Take 400 mcg by mouth daily. Yes Provider, Historical   cyanocobalamin (Vitamin B-12) 2,500 mcg sublingual tablet Take 2,500 mcg by mouth daily. Yes Provider, Historical   cholecalciferol, vitamin D3, (Vitamin D3) 50 mcg (2,000 unit) tab Take  by mouth two (2) times a day. Yes Provider, Historical   zinc sulfate (ZINC-15 PO) Take  by mouth two (2) times a day. Yes Provider, Historical   potassium 99 mg tablet Take 99 mg by mouth daily. Yes Provider, Historical   aspirin 81 mg chewable tablet Take 1 Tab by mouth daily. Patient taking differently: Take 81 mg by mouth two (2) times a week.  8/14/17  Yes Bernie Medina MD   LORazepam (ATIVAN) 0.5 mg tablet Take 1 Tab by mouth every six (6) hours as needed. Max Daily Amount: 2 mg. Do not drive or drink alcohol if taking this medicine. 8/13/17  Yes Colvin Bosworth, MD   metoprolol tartrate (LOPRESSOR) 50 mg tablet Take 50 mg by mouth two (2) times a day. Patient not taking: Reported on 3/3/2022    Provider, Historical   fluticasone (Flonase Sensimist) 27.5 mcg/actuation nasal spray 2 Sprays by Nasal route daily. Patient not taking: Reported on 3/3/2022    Provider, Historical   fexofenadine (ALLEGRA) 180 mg tablet Take 180 mg by mouth daily. Patient not taking: Reported on 3/3/2022    Provider, Historical     Allergies   Allergen Reactions    Epinephrine Other (comments)     \"severe tachycardia\"     Mold Extracts Unknown (comments)    Morphine Nausea and Vomiting      Social History     Tobacco Use    Smoking status: Never Smoker    Smokeless tobacco: Never Used   Substance Use Topics    Alcohol use: Yes     Alcohol/week: 3.0 standard drinks     Types: 2 Glasses of wine, 1 Shots of liquor per week      Family History   Problem Relation Age of Onset    Diabetes Mother     Lung Disease Father     Breast Cancer Other         age unknown        REVIEW OF SYSTEMS: A comprehensive review of systems was negative except for that written in the HPI. Objective:   Constitutional:@ appears stated age. Pt is cooperative and is in no acute distress. Well nourished. Well developed. Body habitus is normal. There is no height or weight on file to calculate BMI. Eyes: Sclera are nonicteric. Respiratory: No labored breathing. Cardiovascular: No marked edema. Well perfused extremities bilaterally. Skin: No marked skin ulcers. No lymphedema or skin abnormalities. Neurological: No marked sensory loss noted. Grossly neurovascularly intact. Both lower extremities are intact to distal sensory and motor function. Psychiatric: Alert and oriented x3. MUSCULOSKELETAL:   Gait is antalgic left hip.    Painless trunk ROM. No obvious LLD. 5/5 BLE strength. Left hip with 45 degrees external rotation, 10 degrees internal rotation, groin pain with motion    Radiographs:       No imaging obtained   I reviewed hip films from her last visit with us which show severe end-stage DJD with bone-on-bone articulation medial aspect of the hip, sclerosis and osteophyte formation, early coxa profunda. Assessment:     ICD-10-CM   1. Osteoarthritis of left hip, unspecified osteoarthritis type M16.12     There is no problem list on file for this patient. Plan:   Recommended left total hip replacement. We will need cardiology clearance. Conservative treatments including activity modification, medication, and steroid injections have not successfully relieved pain. Surgery was discussed at length with the pt today. We went over all the pertinent risks, benefits, and alternatives to the procedure. They understand no guarantees can be made about the outcome and they would like to proceed. I discussed the pre-op total joint class and general recovery timeline with the pt. The pt understands the need for medical clearance. All questions were answered to her satisfaction.  Follow up prn    Orders Placed This Encounter   Procedures    BMI >=25 PATIENT INSTRUCTIONS & EDUCATION     [place], [place]    Supervising physician: KIM Hammonds

## 2022-03-03 NOTE — H&P
Date of Surgery Update:  Blair Hahn was seen and examined on the day of surgery prior to the procedure. There were no significant clinical changes since the completion of the History and Physical.    Exam today prior to surgery showed no acute cardiac findings, no respiratory difficulty, and no abdominal complaints or pain. This patient is a candidate for TXA. The patient was counseled at length about the risks of john Covid-19 during their perioperative period and any recovery window from their procedure. The patient was made aware that john Covid-19  may worsen their prognosis for recovering from their procedure and lend to a higher morbidity and/or mortality risk. All material risks, benefits, and reasonable alternatives including postponing the procedure were discussed. The patient does wish to proceed with the procedure at this time. Documentation of Medical Necessity:    Symptoms: pain with activity and at rest, antalgia, interferes with ADLs    Conservative Treatment: activity modification, multiple medications, injection    Physical Findings: painful AROM/PROM, antalgia on ambulation, no trochanteric pain    Imaging: significant OA, sclerosis and osteophytes    Indications:   Failure of conservative treatments with daily pain and functional limitations. Appropriate imaging demonstrating significant disease. Appropriate physical findings consistent with significant degenerative joint disease. All pertinent risks, benefits, and alternatives to operative management including continued conservative care were explained at length. The patient has elected to proceed with appropriately indicated and medically necessary total joint arthroplasty. They understand no guarantees can be given about the outcome.     Signed By: KIM Larios     March 3, 2022 7:41 AM

## 2022-03-03 NOTE — PROGRESS NOTES
Ortho / Neurosurgery NP Note    POD# 0  s/p LEFT TOTAL HIP ARTHROPLASTY  ANTERIOR APPROACH WITH NAVIGATION   Pt seen with  at bedside. Pt resting in bed. Very drowsy, falls to sleep during conversation. Complaints of nausea, medicated with zofran   Denies pain. Would like oxycodone changed to Tramadol, which she has tolerated in past.     VSS Afebrile. 2L NC    Visit Vitals  /61 (BP 1 Location: Left arm, BP Patient Position: At rest)   Pulse 65   Temp 97.6 °F (36.4 °C)   Resp 16   Ht 5' 2\" (1.575 m)   Wt 60.6 kg (133 lb 9.6 oz)   SpO2 98%   BMI 24.44 kg/m²       Voiding status: due to void   Output (mL)  Last Bowel Movement Date: 03/03/22 (03/03/22 1149)      Labs    Lab Results   Component Value Date/Time    HGB 11.7 02/22/2022 11:48 AM      Lab Results   Component Value Date/Time    INR 1.1 02/22/2022 11:48 AM      Lab Results   Component Value Date/Time    Sodium 137 02/22/2022 11:48 AM    Potassium 3.8 02/22/2022 11:48 AM    Chloride 104 02/22/2022 11:48 AM    CO2 29 02/22/2022 11:48 AM    Glucose 95 02/22/2022 11:48 AM    BUN 13 02/22/2022 11:48 AM    Creatinine 0.77 02/22/2022 11:48 AM    Calcium 8.9 02/22/2022 11:48 AM     Recent Glucose Results: No results found for: GLU, GLUPOC, GLUCPOC        Body mass index is 24.44 kg/m². : A BMI > 30 is classified as obesity and > 40 is classified as morbid obesity. Dressing c.d.i  Cryotherapy in place over incision  Calves soft and supple;  No pain with passive stretch  Sensation and motor intact  SCDs for mechanical DVT proph while in bed     PLAN:  1) PT BID, OT - WBAT   2) Aspirin 81 mg PO BID for DVT Prophylaxis   3) GI Prophylaxis - Protonix   4) Readniess for discharge:     [x] Vital Signs stable    [] Hgb stable    [] + Voiding    [x] Wound intact, drainage minimal    [] Tolerating PO intake     [] Cleared by PT (OT if applicable)     [] Stair training completed (if applicable)    [] Independent / Contact Guard Assist (household distance)     [] Bed mobility     [] Car transfers     [] ADLs    [] Adequate pain control on oral medication alone     Allow time to wake up from anesthesia - therapy later today. Plans to return home with [facility] and 's support.      Quique Villanueva, NP

## 2022-03-03 NOTE — PERIOP NOTES
0911-Handoff Report from Operating Room to PACU    Report received from [address] [place] [address] and CRISTAL Maciel CRNA regarding Blair Hahn. Surgeon(s):  Harley Frey MD  And Procedure(s) (LRB):  LEFT TOTAL HIP ARTHROPLASTY  ANTERIOR APPROACH WITH NAVIGATION (Left)  confirmed   with allergies and dressings discussed. Anesthesia type, drugs, patient history, complications, estimated blood loss, vital signs, intake and output, and last pain medication, lines, reversal medications and temperature were reviewed. 1-  updated. 1125- TRANSFER - OUT REPORT:    Verbal report given to [facility] RN(name) on Blair Hahn  being transferred to ortho(unit) for routine post - op       Report consisted of patients Situation, Background, Assessment and   Recommendations(SBAR). Information from the following report(s) SBAR, Kardex, OR Summary, Procedure Summary, Intake/Output, MAR and Recent Results was reviewed with the receiving nurse. Opportunity for questions and clarification was provided.       Patient transported with:   O2 @ 2 liters  Tech

## 2022-03-03 NOTE — PERIOP NOTES
No covid test prior to surgery. Patient Was positive for covid 3 weeks ago. Denies s/s. Belongings to PACU, 1 bag.  in waiting room or via cell  Bed in low position, call bell in reach.  Side rails up x 2

## 2022-03-03 NOTE — DISCHARGE INSTRUCTIONS
Discharge Instructions:  Blair Cutting    Surgery: LEFT TOTAL HIP ARTHROPLASTY  ANTERIOR APPROACH WITH NAVIGATION  Surgeon:   Harley Frey MD  Surgery Date:  3/3/2022    To relieve pain:   Use ice/gel packs.    -Put the ice pack directly over the wound, or anywhere you are hurting or swollen.   -To control pain and swelling, keep ice on regularly, especially after physical activity.  -The packs should stay cold for 3-4 hours. When it is not cold anymore, rotate with the packs in the freezer.  Elevate your leg. This will also keep swelling down.  Rest for at least 20 minutes between activity or exercises. To keep track of your pain medications, write down what you take and when you take it.  The last dose of pain medication you got in the hospital was:     Medication    Dose    Date & Time      Choose your medications based on the pain scale below:     To keep your pain under control, take Tylenol every 6 hours for 14 days - even if you feel like you dont need it.  For mild to moderate pain (4-6 on pain scale), take one pain pill every 4 hours or as instructed.  For severe pain (7-10 on pain scale), take two pain pills every 4 hours or as instructed.  To prevent nausea, take your pain medications with food. Pain Scale              As your pain lessens:     Slowly start taking less pain medication. You may do this by waiting longer between doses or by taking smaller doses.  Stop using the pain medications as soon as you no longer need it, usually in 2-3 weeks. Aspirin   To prevent blood clots, you will need to take Aspirin 81 mg twice a day for 30 days. To prevent stomach upset or bleeding:   Do not take non-steroidal anti-inflammatory medications (Ibuprofen, Advil, Motrin, Naproxen, etc.)    Take Pepcid 20 mg twice a day, or a similar home medication, while you are taking a blood thinner.           Keep your waterproof dressing in place. It will be removed by your surgeon during your follow-up appointment in 2 weeks.  You may need to change the dressing if you are having drainage to where the dressing is no longer intact. You will be given an extra dressing to use at home.  You will have some swelling, warmth, and bruising around the incision and up and down the leg after surgery. This will may get worse in the first few days you are home and will slowly get better over the next few weeks.  You may shower with this dressing over your wound. After showering pat the dressing dry.  DO NOT's:   Do not rub your surgical wound   Do not put lotion or oils on your wound.  Do not take a tub bath or go swimming until your doctor says it is ok. To increase and promote healing:   Stop Smoking (or at least cut back on smoking).  Eat a well-balanced diet. High in protein and Vitamin C.      If you have a poor appetite, supplement your diet by drinking Ensure, Glucerna or [place] Instant Breakfast for the next 30 days      If you are a diabetic, control you blood sugars to prevent infection and help your wound heal.                     Prevent Infection:     Wash your hands                       -This is the most important thing you or your caregiver can do. -Wash your hands with soap and water (or use the hand ) before you touch any wounds.  Shower  -Use the antibacterial soap we gave you when you take a shower.   -Shower with this soap until your wounds are healed.  Use Clean Sheets   -Put freshly cleaned sheets on your bed after surgery.   -To keep the surgery site clean, do not allow pets to sleep with you while your wound is still healing.  To prevent constipation, stay active and drink plenty of fluid.      While using pain medications, you should also take stool softeners and laxatives, such as Pericolace and Miralax.  If you are having too many bowel movements, then you may need to stop taking the laxatives.  You should have a bowel movement 3-4 days after surgery and then at least every other day while on pain medication.  To improve your recovery, you must be active!  Use your walker and take short walks (in your home) about every 2 hours during the day.  Try to increase how far you walk each day.  You can put as much weight on your leg as you can tolerate while walking.  Home health physical therapy will come to your home the day after you leave the hospital. The therapist will visit about 4 times over the next couple of weeks to teach you exercises, how to get out of bed and how to safely walk in your home.  NO DRIVING until your surgeon tells you it is ok.  You can return to work when cleared by a physician. Please call your physician immediately if you have:   Constant bleeding from your wound.  Increasing redness or swelling around your wound (some warmth, bruising and swelling is normal).  Change in wound drainage (increase in amount, color, or bad smell).  Change in mental status (unusual behavior).  Temperature over 101.5 degrees Fahrenheit    Pain or redness in the calf (back of your lower leg)   Increased swelling of the thigh, ankle, calf, or foot. Emergency! CALL 911 if you have:   Shortness of breath   Chest pain when you cough or taking a deep breath         Please call your surgeons office at [phone] for a follow up appointment in 2 weeks.  You should call as soon as you get home or the next day after discharge.  If you have questions or concerns during normal business hours, you may reach Dr. Edson Concepcion' Team at [phone].         Instructions for 24 hours after receiving General Anesthesia or Intravenous Sedation,   and while taking prescription Narcotics    Common side effects associated with each of these medications includes:  - Drowsiness, dizziness, euphoria, sleepiness or confusion  - Impaired memory recall  - Unsteady gait, loss of fine muscle control and delayed reaction time  - Visual disturbances, difficulty focusing, blurred vision    You may experience some of these side effects or you may not be aware of subtle changes in your behavior or reaction time. Because you received these medications, we are giving you the following instructions. Discharge Instructions:   - Someone should be with you for the next 24 hours  - For your own safety, a responsible adult must drive you home  - Do not consume alcoholic beverages   - Do not make important personal, legal or business decisions for 24 hours  - Move slowly and carefully, do not make sudden position changes. Be alert for dizziness or lightheadedness and move accordingly. - Do not operate equipment for 24 hours - [employer], power tools, Kitchen accessories: stove, etc.  - If you have not urinated within 8 hours after discharge, please contact your surgeon's office.

## 2022-03-03 NOTE — PROGRESS NOTES
Problem: Mobility Impaired (Adult and Pediatric)  Goal: *Acute Goals and Plan of Care (Insert Text)  Description: FUNCTIONAL STATUS PRIOR TO ADMISSION: Independent household and community distances without device    HOME SUPPORT PRIOR TO ADMISSION: Lives with  in tri-level home, 4 REBECCA with rails. Able to enter in bottom level without steps to stay in den if needed. Physical Therapy Goals  Initiated 3/3/2022    1. Patient will move from supine to sit and sit to supine , scoot up and down, and roll side to side in bed with modified independence within 4 days. 2. Patient will perform sit to stand with supervision/set-up within 4 days. 3. Patient will ambulate with supervision/set-up for 50 feet with the least restrictive device within 4 days. 4. Patient will ascend/descend 4 stairs with B handrail(s) with minimal assistance/contact guard assist within 4 days. 5. Patient will verbalize and demonstrate understanding of hip precautions per protocol within 4 days. 6. Patient will perform hip home exercise program per protocol with supervision/set-up within 4 days. Outcome: Progressing Towards Goal   PHYSICAL THERAPY EVALUATION  Patient: Ekaterina Julien (81 y.o. female)  Date: 3/3/2022  Primary Diagnosis: S/P hip replacement [Z96.649]  Procedure(s) (LRB):  LEFT TOTAL HIP ARTHROPLASTY  ANTERIOR APPROACH WITH NAVIGATION (Left) Day of Surgery   Precautions: Fall, hip precautions (anterior)       ASSESSMENT  Based on the objective data described below, the patient presents POD 0 L total hip arthroplasty. Pt received supine in bed, sleepy, but agreeable to PT session. Vitals stable throughout session. Bed mobility performed with Min A and rail, L LE discomfort. Transfers during session from various ht surfaces with CGA-Min A and RW. Pt walked in room 20ft with RW and Min A for steadying, very antalgic stance phase L LE. Performed toileting tasks with OT during session for void attempt.  Pt returned to bed and supine with Mod A for B LE management due to fatigue. She continues to benefit from skilled PT services. [facility] discharge with increased family support. Current Level of Function Impacting Discharge (mobility/balance): Min ABBE with RW    Functional Outcome Measure: The patient scored 50/100 on the Barthel Index outcome measure which is indicative of 50% impairment in mobility and ADLs. Other factors to consider for discharge: Min A with RW     Patient will benefit from skilled therapy intervention to address the above noted impairments. PLAN :  Recommendations and Planned Interventions: bed mobility training, transfer training, gait training, therapeutic exercises, neuromuscular re-education, patient and family training/education, and therapeutic activities      Frequency/Duration: Patient will be followed by physical therapy:  twice daily to address goals. Recommendation for discharge: (in order for the patient to meet his/her long term goals)  Physical therapy at least 2 days/week in the home AND ensure assist and/or supervision for safety with mobility and fall prevention. This discharge recommendation:  Has been made in collaboration with the attending provider and/or case management    IF patient discharges home will need the following DME: rolling walker         SUBJECTIVE:   Patient stated my hip is so sore, and I'm nauseous.     OBJECTIVE DATA SUMMARY:   HISTORY:    Past Medical History:   Diagnosis Date    Arrhythmia 1996    HX TACHYCARDIA, [place] ABLATION    Arthritis     BLE, spine, hands    COVID 01/2022    GERD (gastroesophageal reflux disease)     Hiatal hernia     Hypertension     Menopause     LMP-1981    Nausea & vomiting     per Dr. Pranav Scherer - no versed, N2O, ro volatile, Dexamethasone , Zofran, and Phergan used    Rheumatic fever     as a child    SSS (sick sinus syndrome) ([facility] [address] 06/2017    s/p PPM     Takotsubo cardiomyopathy 08/2017    Thyroid disease     HYPOTHYROID     Past Surgical History:   Procedure Laterality Date    HX APPENDECTOMY      HX CATARACT REMOVAL Bilateral     HX  SECTION        x3, hysterectomy    HX CHOLECYSTECTOMY      HX HEENT      tonsillectomy    HX HYSTERECTOMY  1981    HX ORTHOPAEDIC      right knee orthoscopic, right wrist surgery    HX ORTHOPAEDIC Bilateral     finger surgery for arthritis and swelling    HX PACEMAKER Left 2017    MO CHEST SURGERY PROCEDURE UNLISTED Left 2017    Pacemaker Placed       Personal factors and/or comorbidities impacting plan of care: NA    Home Situation  Home Environment: Private residence (tri level home)  # Steps to Enter: 4 (3 steps down to den; 10 steps to bedroom)  Rails to [facility]:  (0 steps to lower level where pt can stay)  One/Two Story Residence:  (tri level)  Living Alone: No  Support Systems: Spouse/Significant Other  Patient Expects to be Discharged to[de-identified] Home with home health  Current DME Used/Available at Home:  (heart monitor)  Tub or Shower Type: Tub/Shower combination    EXAMINATION/PRESENTATION/DECISION MAKING:   Critical Behavior:  Neurologic State: Alert,Drowsy  Orientation Level: Appropriate for age  Cognition: Follows commands  Safety/Judgement: Decreased awareness of need for assistance,Decreased awareness of need for safety,Fall prevention  Hearing:   Auditory  Auditory Impairment: None  Hearing Aids/Status: Does not own  Skin:  appears intact  Edema: some swelling in fingers per pt report  Range Of Motion:  AROM: Generally decreased, functional                       Strength:    Strength: Generally decreased, functional                    Tone & Sensation:   Tone: Normal              Sensation: Intact               Coordination:  Coordination: Generally decreased, functional  Vision:   Acuity: Within Defined Limits (had cataract surgery 2 years ago/not formally assessed)  Functional Mobility:  Bed Mobility:  Rolling: Minimum assistance  Supine to Sit: Moderate assistance  Sit to Supine: Moderate assistance  Scooting: Supervision; Additional time  Transfers:  Sit to Stand: Contact guard assistance  Stand to Sit: Contact guard assistance; Additional time (cues for safe technique)                       Balance:   Sitting: Impaired  Sitting - Static: Fair (occasional)  Sitting - Dynamic:  (not assessed, able to lean forward to stand)  Standing: Impaired; With support  Standing - Static: Constant support; Fair  Standing - Dynamic : Constant support; Fair  Ambulation/Gait Training:  Distance (ft): 20 Feet (ft)  Assistive Device: Gait belt;Walker, rolling  Ambulation - Level of Assistance: Minimal assistance        Gait Abnormalities: Antalgic        Base of Support: Widened     Speed/Lizzy: Shuffled; Slow  Step Length: Left lengthened;Right shortened               Functional Measure:  Barthel Index:    Bathin  Bladder: 10  Bowels: 10  Groomin  Dressin  Feedin  Mobility: 0  Stairs: 0  Toilet Use: 5  Transfer (Bed to Chair and Back): 10  Total: 50/100       The Barthel ADL Index: Guidelines  1. The index should be used as a record of what a patient does, not as a record of what a patient could do. 2. The main aim is to establish degree of independence from any help, physical or verbal, however minor and for whatever reason. 3. The need for supervision renders the patient not independent. 4. A patient's performance should be established using the best available evidence. Asking the patient, friends/relatives and nurses are the usual sources, but direct observation and common sense are also important. However direct testing is not needed. 5. Usually the patient's performance over the preceding 24-48 hours is important, but occasionally longer periods will be relevant. 6. Middle categories imply that the patient supplies over 50 per cent of the effort. 7. Use of aids to be independent is allowed.     Score Interpretation (from [address] [address])    Independent   60-79 Minimally independent   40-59 Partially dependent   20-39 Very dependent   <20 Totally dependent     -Brooke Fairchild., Barthel, D.W. (1965). Functional evaluation: the Barthel Index. [address] ([address] (1997). The Barthel activities of daily living index: self-reporting versus actual performance in the old (> or = 75 years). Journal of [address] [place] [address] 45(7), [facility], KIRK, Candace Saucedo., [facility]. (1999). Measuring the change in disability after inpatient rehabilitation; comparison of the responsiveness of the Barthel Index and Functional [place] Measure. Journal of Neurology, Neurosurgery, and Psychiatry, 66(4), 706-476. CHAIM Salmeron, ABRAM Malik, & Stalin Lee M.A. (2004) Assessment of post-stroke quality of life in cost-effectiveness studies: The usefulness of the Barthel Index and the EuroQoL-5D. Quality of Life Research, 15, 464-19         Physical Therapy Evaluation Charge Determination   History Examination Presentation Decision-Making   LOW Complexity : Zero comorbidities / personal factors that will impact the outcome / POC LOW Complexity : 1-2 Standardized tests and measures addressing body structure, function, activity limitation and / or participation in recreation  LOW Complexity : Stable, uncomplicated  LOW Complexity : FOTO score of       Based on the above components, the patient evaluation is determined to be of the following complexity level: LOW     Pain Rating:  Unable to rank    Activity Tolerance:   Good and requires rest breaks    After treatment patient left in no apparent distress:   Supine in bed, Call bell within reach, Caregiver / family present, and Side rails x 3    COMMUNICATION/EDUCATION:   The patients plan of care was discussed with: Occupational therapist and Registered nurse.      Fall prevention education was provided and the patient/caregiver indicated understanding.     Thank you for this referral.  Katie Stacy, PT, DPT, NCS   Time Calculation: 28 mins

## 2022-03-03 NOTE — PROGRESS NOTES
TRANSFER - IN REPORT:    Verbal report received from Gillian(name) on Gurdeep Parra  being received from PACU(unit) for routine post - op      Report consisted of patients Situation, Background, Assessment and   Recommendations(SBAR). Information from the following report(s) SBAR and Kardex was reviewed with the receiving nurse. Opportunity for questions and clarification was provided. Assessment completed upon patients arrival to unit and care assumed.

## 2022-03-04 VITALS
BODY MASS INDEX: 24.59 KG/M2 | HEART RATE: 65 BPM | OXYGEN SATURATION: 97 % | DIASTOLIC BLOOD PRESSURE: 67 MMHG | WEIGHT: 133.6 LBS | TEMPERATURE: 98.7 F | RESPIRATION RATE: 20 BRPM | SYSTOLIC BLOOD PRESSURE: 138 MMHG | HEIGHT: 62 IN

## 2022-03-04 LAB
ANION GAP SERPL CALC-SCNC: 8 MMOL/L (ref 5–15)
BUN SERPL-MCNC: 10 MG/DL (ref 6–20)
BUN/CREAT SERPL: 11 (ref 12–20)
CALCIUM SERPL-MCNC: 8.1 MG/DL (ref 8.5–10.1)
CHLORIDE SERPL-SCNC: 108 MMOL/L (ref 97–108)
CO2 SERPL-SCNC: 23 MMOL/L (ref 21–32)
CREAT SERPL-MCNC: 0.92 MG/DL (ref 0.55–1.02)
GLUCOSE SERPL-MCNC: 148 MG/DL (ref 65–100)
HGB BLD-MCNC: 9.4 G/DL (ref 11.5–16)
POTASSIUM SERPL-SCNC: 3.1 MMOL/L (ref 3.5–5.1)
SODIUM SERPL-SCNC: 139 MMOL/L (ref 136–145)

## 2022-03-04 PROCEDURE — 80048 BASIC METABOLIC PNL TOTAL CA: CPT

## 2022-03-04 PROCEDURE — 74011250637 HC RX REV CODE- 250/637: Performed by: PHYSICIAN ASSISTANT

## 2022-03-04 PROCEDURE — 74011000250 HC RX REV CODE- 250: Performed by: PHYSICIAN ASSISTANT

## 2022-03-04 PROCEDURE — 97535 SELF CARE MNGMENT TRAINING: CPT | Performed by: OCCUPATIONAL THERAPIST

## 2022-03-04 PROCEDURE — 36415 COLL VENOUS BLD VENIPUNCTURE: CPT

## 2022-03-04 PROCEDURE — 94760 N-INVAS EAR/PLS OXIMETRY 1: CPT

## 2022-03-04 PROCEDURE — 97110 THERAPEUTIC EXERCISES: CPT

## 2022-03-04 PROCEDURE — 74011250636 HC RX REV CODE- 250/636: Performed by: PHYSICIAN ASSISTANT

## 2022-03-04 PROCEDURE — 97530 THERAPEUTIC ACTIVITIES: CPT

## 2022-03-04 PROCEDURE — 97116 GAIT TRAINING THERAPY: CPT

## 2022-03-04 PROCEDURE — 74011250637 HC RX REV CODE- 250/637: Performed by: ORTHOPAEDIC SURGERY

## 2022-03-04 PROCEDURE — 85018 HEMOGLOBIN: CPT

## 2022-03-04 PROCEDURE — 74011250637 HC RX REV CODE- 250/637: Performed by: NURSE PRACTITIONER

## 2022-03-04 RX ADMIN — ACETAMINOPHEN 325MG 650 MG: 325 TABLET ORAL at 11:25

## 2022-03-04 RX ADMIN — CARVEDILOL 25 MG: 12.5 TABLET, FILM COATED ORAL at 08:01

## 2022-03-04 RX ADMIN — CELECOXIB 200 MG: 200 CAPSULE ORAL at 09:33

## 2022-03-04 RX ADMIN — ACETAMINOPHEN 325MG 650 MG: 325 TABLET ORAL at 00:13

## 2022-03-04 RX ADMIN — DOCUSATE SODIUM 50MG AND SENNOSIDES 8.6MG 1 TABLET: 8.6; 5 TABLET, FILM COATED ORAL at 09:33

## 2022-03-04 RX ADMIN — ASPIRIN 81 MG: 81 TABLET, COATED ORAL at 09:33

## 2022-03-04 RX ADMIN — POLYETHYLENE GLYCOL 3350 17 G: 17 POWDER, FOR SOLUTION ORAL at 09:33

## 2022-03-04 RX ADMIN — CEFAZOLIN SODIUM 2 G: 1 INJECTION, POWDER, FOR SOLUTION INTRAMUSCULAR; INTRAVENOUS at 00:13

## 2022-03-04 RX ADMIN — SODIUM CHLORIDE, PRESERVATIVE FREE 10 ML: 5 INJECTION INTRAVENOUS at 11:25

## 2022-03-04 RX ADMIN — TRAMADOL HYDROCHLORIDE 50 MG: 50 TABLET, COATED ORAL at 08:01

## 2022-03-04 RX ADMIN — LEVOTHYROXINE SODIUM 112 MCG: 0.11 TABLET ORAL at 09:33

## 2022-03-04 RX ADMIN — DEXAMETHASONE SODIUM PHOSPHATE 10 MG: 4 INJECTION, SOLUTION INTRAMUSCULAR; INTRAVENOUS at 09:33

## 2022-03-04 RX ADMIN — PANTOPRAZOLE SODIUM 40 MG: 40 TABLET, DELAYED RELEASE ORAL at 08:01

## 2022-03-04 RX ADMIN — TRAMADOL HYDROCHLORIDE 100 MG: 50 TABLET, COATED ORAL at 13:16

## 2022-03-04 RX ADMIN — Medication 1 AMPULE: at 09:34

## 2022-03-04 NOTE — PROGRESS NOTES
Problem: Diabetes Self-Management  Goal: *Disease process and treatment process  Description: Define diabetes and identify own type of diabetes; list 3 options for treating diabetes. Outcome: Progressing Towards Goal  Goal: *Incorporating nutritional management into lifestyle  Description: Describe effect of type, amount and timing of food on blood glucose; list 3 methods for planning meals. Outcome: Progressing Towards Goal  Goal: *Incorporating physical activity into lifestyle  Description: State effect of exercise on blood glucose levels. Outcome: Progressing Towards Goal  Goal: *Developing strategies to promote health/change behavior  Description: Define the ABC's of diabetes; identify appropriate screenings, schedule and personal plan for screenings. Outcome: Progressing Towards Goal  Goal: *Using medications safely  Description: State effect of diabetes medications on diabetes; name diabetes medication taking, action and side effects. Outcome: Progressing Towards Goal  Goal: *Monitoring blood glucose, interpreting and using results  Description: Identify recommended blood glucose targets  and personal targets. Outcome: Progressing Towards Goal  Goal: *Prevention, detection, treatment of acute complications  Description: List symptoms of hyper- and hypoglycemia; describe how to treat low blood sugar and actions for lowering  high blood glucose level. Outcome: Progressing Towards Goal  Goal: *Prevention, detection and treatment of chronic complications  Description: Define the natural course of diabetes and describe the relationship of blood glucose levels to long term complications of diabetes.   Outcome: Progressing Towards Goal  Goal: *Developing strategies to address psychosocial issues  Description: Describe feelings about living with diabetes; identify support needed and support network  Outcome: Progressing Towards Goal  Goal: *Insulin pump training  Outcome: Progressing Towards Goal  Goal: *Sick day guidelines  Outcome: Progressing Towards Goal  Goal: *Patient Specific Goal (EDIT GOAL, INSERT TEXT)  Outcome: Progressing Towards Goal     Problem: Patient Education: Go to Patient Education Activity  Goal: Patient/Family Education  Outcome: Progressing Towards Goal     Problem: Falls - Risk of  Goal: *Absence of Falls  Description: Document Cherylle Cockayne Fall Risk and appropriate interventions in the flowsheet.   Outcome: Progressing Towards Goal  Note: Fall Risk Interventions:  Mobility Interventions: Assess mobility with egress test,Communicate number of staff needed for ambulation/transfer,OT consult for ADLs,Patient to call before getting OOB,PT Consult for mobility concerns,Strengthening exercises (ROM-active/passive),Utilize walker, cane, or other assistive device,Utilize gait belt for transfers/ambulation,PT Consult for assist device competence         Medication Interventions: Assess postural VS orthostatic hypotension,Evaluate medications/consider consulting pharmacy,Patient to call before getting OOB,Teach patient to arise slowly,Utilize gait belt for transfers/ambulation    Elimination Interventions: Call light in reach,Elevated toilet seat,Patient to call for help with toileting needs,Stay With Me (per policy),Toilet paper/wipes in reach,Toileting schedule/hourly rounds              Problem: Patient Education: Go to Patient Education Activity  Goal: Patient/Family Education  Outcome: Progressing Towards Goal     Problem: Patient Education: Go to Patient Education Activity  Goal: Patient/Family Education  Outcome: Progressing Towards Goal     Problem: Patient Education: Go to Patient Education Activity  Goal: Patient/Family Education  Outcome: Progressing Towards Goal     Problem: Patient Education: Go to Patient Education Activity  Goal: Patient/Family Education  Outcome: Progressing Towards Goal     Problem: Hip Replacement: Day of Surgery/Unit  Goal: Off Pathway (Use only if patient is Off Pathway)  Outcome: Progressing Towards Goal  Goal: Activity/Safety  Outcome: Progressing Towards Goal  Goal: Consults, if ordered  Outcome: Progressing Towards Goal  Goal: Diagnostic Test/Procedures  Outcome: Progressing Towards Goal  Goal: Nutrition/Diet  Outcome: Progressing Towards Goal  Goal: Medications  Outcome: Progressing Towards Goal  Goal: Respiratory  Outcome: Progressing Towards Goal  Goal: Treatments/Interventions/Procedures  Outcome: Progressing Towards Goal  Goal: Psychosocial  Outcome: Progressing Towards Goal  Goal: *Initiate mobility  Outcome: Progressing Towards Goal  Goal: *Optimal pain control at patient's stated goal  Outcome: Progressing Towards Goal  Goal: *Hemodynamically stable  Outcome: Progressing Towards Goal     Problem: Hip Replacement: Post Op Day 1  Goal: Off Pathway (Use only if patient is Off Pathway)  Outcome: Progressing Towards Goal  Goal: Activity/Safety  Outcome: Progressing Towards Goal  Goal: Diagnostic Test/Procedures  Outcome: Progressing Towards Goal  Goal: Nutrition/Diet  Outcome: Progressing Towards Goal  Goal: Medications  Outcome: Progressing Towards Goal  Goal: Respiratory  Outcome: Progressing Towards Goal  Goal: Treatments/Interventions/Procedures  Outcome: Progressing Towards Goal  Goal: Psychosocial  Outcome: Progressing Towards Goal  Goal: Discharge Planning  Outcome: Progressing Towards Goal  Goal: *Demonstrates progressive activity  Outcome: Progressing Towards Goal  Goal: *Optimal pain control at patient's stated goal  Outcome: Progressing Towards Goal  Goal: *Hemodynamically stable  Outcome: Progressing Towards Goal  Goal: *Discharge plan identified  Outcome: Progressing Towards Goal     Problem: Hip Replacement: Post-Op Day 2  Goal: Off Pathway (Use only if patient is Off Pathway)  Outcome: Progressing Towards Goal  Goal: Activity/Safety  Outcome: Progressing Towards Goal  Goal: Diagnostic Test/Procedures  Outcome: Progressing Towards Goal  Goal: Nutrition/Diet  Outcome: Progressing Towards Goal  Goal: Medications  Outcome: Progressing Towards Goal  Goal: Respiratory  Outcome: Progressing Towards Goal  Goal: Treatments/Interventions/Procedures  Outcome: Progressing Towards Goal  Goal: Psychosocial  Outcome: Progressing Towards Goal  Goal: *Met physical therapy criteria for discharge to the next level of care  Outcome: Progressing Towards Goal  Goal: *Optimal pain control with oral analgesia  Outcome: Progressing Towards Goal  Goal: *Hemodynamically stable  Outcome: Progressing Towards Goal  Goal: *Tolerating diet  Outcome: Progressing Towards Goal  Goal: *Verbalizes understanding of any indicated hip precautions  Outcome: Progressing Towards Goal  Goal: *Patient verbalizes understanding of discharge instructions  Outcome: Progressing Towards Goal

## 2022-03-04 NOTE — PROGRESS NOTES
Problem: Self Care Deficits Care Plan (Adult)  Goal: *Acute Goals and Plan of Care (Insert Text)  Description: FUNCTIONAL STATUS PRIOR TO ADMISSION: Pt lives with her  in a tri-level home. There is a lower level entrance without steps. Pt reports independence in adls and did not use any DME nor AE during adls. HOME SUPPORT: The patient lived with . .    Occupational Therapy Goals  Initiated 3/3/2022     1. Patient will perform lower body dressing using adaptive aids, prn at supervision/set-up level within 7 days. 2. Patient will perform toilet transfers at supervision/set-up level using best technique and appropriate DME  within 7 days. 3. Patient will perform all aspects of toileting at independence level within 7 days. 4. Patient will verbalize and demonstrate knowledge of anterior  hip precautions without cues within 7 days. 5. Patient will perform standing adls for at least 6 minutes within 7 days. Outcome: Progressing Towards Goal   OCCUPATIONAL THERAPY TREATMENT  Patient: Blair Hahn (46 y.o. female)  Date: 3/4/2022  Diagnosis: S/P hip replacement [Z96.649] <principal problem not specified>  Procedure(s) (LRB):  LEFT TOTAL HIP ARTHROPLASTY  ANTERIOR APPROACH WITH NAVIGATION (Left) 1 Day Post-Op  Precautions:   fall anterior hip precautions  Chart, occupational therapy assessment, plan of care, and goals were reviewed. ASSESSMENT  Patient continues with skilled OT services and is progressing towards goals. Pt reports significant pain in R hip, and was ambulating with PT when OT session began. Pt required CGA/S for bathroom mobility and toilet transfer using the RW and grab bar. Able to stand at sink for handwashing with supervision. Pt reported feeling tired post adl mobility, and sat EOB to complete session. Pt was able to reach down from seated position to her mid shins--she will need some assistance initially with  lower body adls.   Pt declines need for kit of AE as she reports that her hands will not be able to manipulate the AE. Min A for sit to supine, as pt requested to rest in bed post tx session. Pt will benefit from Occupational Therapy at discharge along with 24 hour assistance/supervision. Current Level of Function Impacting Discharge (ADLs): up to max A for lower body adls. Assist X1- CGA-S using RW for adl mobility    Other factors to consider for discharge: reports high level of pain.  will assist at discharge. PLAN :  Patient continues to benefit from skilled intervention to address the above impairments. Continue treatment per established plan of care to address goals. Recommend with staff: encourage frequent position changes and using ice packs        Recommendation for discharge: (in order for the patient to meet his/her long term goals)  Occupational therapy at least 2 days/week in the home AND ensure assist and/or supervision for safety with adls and mobility    This discharge recommendation:  Has not yet been discussed the attending provider and/or case management    IF patient discharges home will need the following DME: RW, pt would benefit from tub bench. Pt would like a raised toilet seat/frame. Educated on where to investigate. Pt reports that she has a resource at medical supply store       SUBJECTIVE:   Patient stated I can't hardly open a container . .... I'll throw them across the room .    (she reports she would throw the AE across the room in frustration)      OBJECTIVE DATA SUMMARY:   Cognitive/Behavioral Status:  Neurologic State: Alert  Orientation Level: Appropriate for age  Cognition: Decreased attention/concentration; Follows commands (very talkative)  Perception: Appears intact  Perseveration: No perseveration noted  Safety/Judgement: Decreased awareness of need for assistance;Decreased awareness of need for safety; Fall prevention    Functional Mobility and Transfers for ADLs:  Bed Mobility:  Rolling:  (pt was ambulating with PT upon arrival)  Sit to supine: Contact guard assistance; Additional time  Scooting: Supervision; Additional time    Transfers:  Sit to Stand: Contact guard assistance  Functional Transfers  Bathroom Mobility: Contact guard assistance  Toilet Transfer : Contact guard assistance; Additional time (cues for safe technique)  Tub Transfer:  (educ when able to perform steps, can try a tub bench )  Cues: Physical assistance;Verbal cues provided;Visual cues provided  Adaptive Equipment: Walker (comment)       Balance:  Sitting: Intact; With support  Sitting - Static: Fair (occasional)  Sitting - Dynamic: Fair (occasional)  Standing: Intact; With support  Standing - Static: Fair;Constant support  Standing - Dynamic : Fair;Constant support    ADL Intervention:       Grooming  Position Performed: Standing  Washing Hands: Supervision;Stand-by assistance         Lower Body Bathing  Lower Body :  (educ on avail of AE- unable to reach feet, verbal understand)  Position Performed: Seated edge of bed (has tall bed at home)  Cues: Verbal cues provided  Adaptive Equipment:  (educ on availability pt declined due to her poor hand skills)         Lower Body Dressing Assistance  Dressing Assistance:  (educated on avail of AE--pt reports that she is unable to use due to poor hand skills at baseline)  Socks: Moderate assistance (able to reach to mid shin only--not to feet, will have Assist)  Leg Crossed Method Used: No  Position Performed: Seated edge of bed  Adaptive Equipment Used:  (none-  pt declines need for--does not want AE )    Toileting  Toileting Assistance: Stand-by assistance  Bladder Hygiene: Independent  Bowel Hygiene: Stand-by assistance  Clothing Management: Minimum assistance  Cues: Verbal cues provided  Adaptive Equipment: Grab bars; Walker    Cognitive Retraining  Safety/Judgement: Decreased awareness of need for assistance;Decreased awareness of need for safety; Fall prevention  Patient recalled and demonstrated avoiding extreme planes of movement with Right LE during ADLs and functional mobility with frequent cues. Home safety: Patient instructed on home modifications and safety (raise height of ADL objects, appropriate height of chair surfaces, recliner safety, change of floor surfaces, clear pathways) to increase independence and fall prevention. Patient indicated understanding. Standing: Patient instructed and demonstrated to walk up to sink/counter top/surfaces, step into walker to increase safety of joint and fall prevention with Supervision. Patient educated about hip anatomy verbally and with pictures and educated to avoid rotation of Right LE. Instructed to apply concept to ADLs within the home  square off while using objects. Patient instructed to increase amount of time standing, observe standing position during ADLs in order to increase even weight bearing through bilateral LEs in order to increase independence with ADLs. Tub transfer: Patient instructed regarding when it is safe to begin transfer into tub (complete stairs with PT, advance exercises with PT high enough to clear tub height). Patient instructed to use the same technique as used with stairs when entering and exiting tub (\"up with the non-surgical, down with the surgical leg\"). Patient indicated understanding. Pt plans to sponge bathe and states she would like a tub bench. Therapeutic Exercises:   Encouraged gentle reaching to feet with gentle comfortable stretch with goal of reaching feet for lower body adsl  --pt does not want AE kit. Pain:  Did not rate pain, but reports pain throughout tx session. She is not yet due to medication. Activity Tolerance:   Fair, Poor, requires rest breaks, and states she must return to bed to rest post tx session.       After treatment patient left in no apparent distress:   Supine in bed, Call bell within reach, Caregiver / family present, Side rails x 3, and ice pack provided    COMMUNICATION/COLLABORATION:   The patients plan of care was discussed with: Physical therapy assistant and Registered nurse.      Earline Rivers OTR/L  Time Calculation: 28 mins

## 2022-03-04 NOTE — PROGRESS NOTES
Problem: Falls - Risk of  Goal: *Absence of Falls  Description: Document Sergey Everett Fall Risk and appropriate interventions in the flowsheet.   Outcome: Progressing Towards Goal  Note: Fall Risk Interventions:  Mobility Interventions: Communicate number of staff needed for ambulation/transfer         Medication Interventions: Patient to call before getting OOB    Elimination Interventions: Call light in reach

## 2022-03-04 NOTE — PROGRESS NOTES
Problem: Mobility Impaired (Adult and Pediatric)  Goal: *Acute Goals and Plan of Care (Insert Text)  Description: FUNCTIONAL STATUS PRIOR TO ADMISSION: Independent household and community distances without device    HOME SUPPORT PRIOR TO ADMISSION: Lives with  in tri-level home, 4 REBECCA with rails. Able to enter in bottom level without steps to stay in den if needed. Physical Therapy Goals  Initiated 3/3/2022    1. Patient will move from supine to sit and sit to supine , scoot up and down, and roll side to side in bed with modified independence within 4 days. 2. Patient will perform sit to stand with supervision/set-up within 4 days. 3. Patient will ambulate with supervision/set-up for 50 feet with the least restrictive device within 4 days. 4. Patient will ascend/descend 4 stairs with B handrail(s) with minimal assistance/contact guard assist within 4 days. 5. Patient will verbalize and demonstrate understanding of hip precautions per protocol within 4 days. 6. Patient will perform hip home exercise program per protocol with supervision/set-up within 4 days. 3/4/2022 1445 by Soha Martinez PTA  Outcome: Progressing Towards Goal  3/4/2022 1020 by Soha Martinez PTA  Outcome: Progressing Towards Goal   PHYSICAL THERAPY TREATMENT  Patient: Dottie Borrero (11 y.o. female)  Date: 3/4/2022  Diagnosis: S/P hip replacement [Z96.649] <principal problem not specified>  Procedure(s) (LRB):  LEFT TOTAL HIP ARTHROPLASTY  ANTERIOR APPROACH WITH NAVIGATION (Left) 1 Day Post-Op  Precautions:    Chart, physical therapy assessment, plan of care and goals were reviewed. ASSESSMENT  Patient continues with skilled PT services and is progressing towards goals. Pt presents with increased pain levels in L hip with mobility, self-limiting behavior, decreased activity tolerance, impaired balance, decreased strength & ROM, and overall impaired functional mobility.  Pt received in sitting with no complaints of pain while at rest and needing max encouragement however ultimately agreeable to participate with therapy. Pt intermittently tangential with conversation needing redirection to task and presents with self-limiting behavior. Overall moves well with therapy CGA with very slow gait and antalgic however no loss of balance. Able to progress gait from step-to, to step-through with cueing for sequencing. Pt reporting no stairs to enter household (conflicting with information from eval), and politely deferring stair training due to complaints of increased pain. Able to demonstrate car transfer training with cues for proper sequencing with no safety concerns. Pt reported 15/10 pain during car transfer however ambulating 150ft CGA w/RW with no complaints of pain to return back to room. Pt left in bathroom with OT present. Pt has been cleared from a physical therapy standpoint. Current Level of Function Impacting Discharge (mobility/balance): CGA    Other factors to consider for discharge: can be self-limiting, pain management         PLAN :  Patient continues to benefit from skilled intervention to address the above impairments. Continue treatment per established plan of care. to address goals. Recommendation for discharge: (in order for the patient to meet his/her long term goals)  Physical therapy at least 2 days/week in the home AND ensure assist and/or supervision for safety with mobility & ADL's    This discharge recommendation:  Has been made in collaboration with the attending provider and/or case management    IF patient discharges home will need the following DME: rolling walker       SUBJECTIVE:   Patient stated Jennifer Delacruz got to stay a couple of days, you don't understand this pain.     OBJECTIVE DATA SUMMARY:   Critical Behavior:  Neurologic State: Alert,Eyes open spontaneously  Orientation Level: Oriented X4  Cognition: Follows commands  Safety/Judgement: Decreased awareness of need for assistance,Decreased awareness of need for safety,Fall prevention  Functional Mobility Training:  Bed Mobility:  Rolling: Contact guard assistance  Supine to Sit: Contact guard assistance; Additional time     Scooting: Supervision; Additional time     Transfers:  Sit to Stand: Contact guard assistance  Stand to Sit: Contact guard assistance     Balance:  Sitting: Intact; With support  Sitting - Static: Fair (occasional)  Sitting - Dynamic: Fair (occasional)  Standing: Intact; With support  Standing - Static: Fair;Constant support  Standing - Dynamic : Fair;Constant support  Ambulation/Gait Training:  Distance (ft): 150 Feet (ft)  Assistive Device: Gait belt;Walker, rolling  Ambulation - Level of Assistance: Contact guard assistance    Gait Abnormalities: Antalgic;Decreased step clearance; Step to gait    Base of Support: Widened     Speed/Lizzy: Pace decreased (<100 feet/min); Shuffled; Slow  Step Length: Left shortened;Right shortened    Pain Rating:  Pt reported 15/10 pain with mobility, with no pain while at rest    Activity Tolerance:   Fair      After treatment patient left in no apparent distress:   Pt left in bathroom with OT present    COMMUNICATION/COLLABORATION:   The patients plan of care was discussed with: Registered nurse.      Maria M Alfaro PTA   Time Calculation: 39 mins

## 2022-03-04 NOTE — PROGRESS NOTES
Problem: Falls - Risk of  Goal: *Absence of Falls  Description: Document Lizeth Viera Fall Risk and appropriate interventions in the flowsheet.   3/4/2022 0024 by Oral Barthoolmew  Outcome: Progressing Towards Goal  Note: Fall Risk Interventions:  Mobility Interventions: Communicate number of staff needed for ambulation/transfer         Medication Interventions: Patient to call before getting OOB    Elimination Interventions: Call light in reach           3/4/2022 0023 by Oral Bartholomew  Outcome: Progressing Towards Goal  Note: Fall Risk Interventions:  Mobility Interventions: Communicate number of staff needed for ambulation/transfer         Medication Interventions: Patient to call before getting OOB    Elimination Interventions: Call light in reach              Problem: Hip Replacement: Day of Surgery/Unit  Goal: Off Pathway (Use only if patient is Off Pathway)  Outcome: Progressing Towards Goal  Goal: Activity/Safety  Outcome: Progressing Towards Goal  Goal: Consults, if ordered  Outcome: Progressing Towards Goal  Goal: Diagnostic Test/Procedures  Outcome: Progressing Towards Goal  Goal: Nutrition/Diet  Outcome: Progressing Towards Goal  Goal: Medications  Outcome: Progressing Towards Goal  Goal: Respiratory  Outcome: Progressing Towards Goal  Goal: Treatments/Interventions/Procedures  Outcome: Progressing Towards Goal  Goal: Psychosocial  Outcome: Progressing Towards Goal  Goal: *Initiate mobility  Outcome: Progressing Towards Goal  Goal: *Optimal pain control at patient's stated goal  Outcome: Progressing Towards Goal  Goal: *Hemodynamically stable  Outcome: Progressing Towards Goal

## 2022-03-04 NOTE — PROGRESS NOTES
Ortho / Neurosurgery NP Note    POD# 1  s/p LEFT TOTAL HIP ARTHROPLASTY  ANTERIOR APPROACH WITH NAVIGATION   Pt seen with daughter at bedside. Pt resting in chair, c/o 8/10 pain and \"I just feel lowsy\"   Complaints of nausea, tired, weak  Received Tramadol at 8 am, aware to ask for it Q 6 PRN. Discussed with nurse and may benefit from getting next dose at 1pm (after lunch/before therapy)    VSS Afebrile. Room air    Visit Vitals  BP (!) 154/71 (BP 1 Location: Left arm, BP Patient Position: At rest)   Pulse 68   Temp 98 °F (36.7 °C)   Resp 20   Ht 5' 2\" (1.575 m)   Wt 60.6 kg (133 lb 9.6 oz)   SpO2 98%   BMI 24.44 kg/m²       Voiding status: + void   Output (mL)  Urine Voided: 200 ml (03/04/22 0755)  Last Bowel Movement Date: 03/03/22 (03/04/22 0755)      Labs    Lab Results   Component Value Date/Time    HGB 9.4 (L) 03/04/2022 04:33 AM      Lab Results   Component Value Date/Time    INR 1.1 02/22/2022 11:48 AM      Lab Results   Component Value Date/Time    Sodium 139 03/04/2022 04:33 AM    Potassium 3.1 (L) 03/04/2022 04:33 AM    Chloride 108 03/04/2022 04:33 AM    CO2 23 03/04/2022 04:33 AM    Glucose 148 (H) 03/04/2022 04:33 AM    BUN 10 03/04/2022 04:33 AM    Creatinine 0.92 03/04/2022 04:33 AM    Calcium 8.1 (L) 03/04/2022 04:33 AM     Recent Glucose Results:   Lab Results   Component Value Date/Time     (H) 03/04/2022 04:33 AM     Body mass index is 24.44 kg/m². : A BMI > 30 is classified as obesity and > 40 is classified as morbid obesity. Dressing c.d.i  Cryotherapy in place over incision  Calves soft and supple;  No pain with passive stretch  Sensation and motor intact  SCDs for mechanical DVT proph while in bed     PLAN:  1) PT BID, OT - WBAT   2) Aspirin 81 mg PO BID for DVT Prophylaxis   3) GI Prophylaxis - Protonix   4) Readniess for discharge:     [x] Vital Signs stable    [x] Hgb stable    [x] + Voiding    [x] Wound intact, drainage minimal    [x] Tolerating PO intake     [] Cleared by PT (OT if applicable)     [] Stair training completed (if applicable)    [] Independent / Contact Guard Assist (household distance)     [] Bed mobility     [] Car transfers     [] ADLs    [] Adequate pain control on oral medication alone     Plans to return home with [employer] and 's support today if clears PM PT, pain controlled and medically stable.  Otherwise may need one more night stay    Neeraj Avalos NP

## 2022-03-04 NOTE — PROGRESS NOTES
DISCHARGE NOTE FROM [facility]    Patient determined to be stable for discharge by attending provider. I have reviewed the discharge instructions with the patient and spouse. They verbalized understanding and all questions were answered to their satisfaction. No complaints or further questions were expressed. Medications sent to pharmacy. Appropriate educational materials and medication side effect teaching were provided. PIV were removed prior to discharge. Patient did not discharge with any line, domingo, or drain. Personal items and valuables accounted for at discharge by patient and/or family: YES    Post-op patient: Yes- Patient given post-op discharge kit and instructed on use.        Ainsley Valenzuela, RN

## 2022-03-04 NOTE — PROGRESS NOTES
End of Shift Note    Bedside shift change report given to Saud (oncoming nurse) by Juhi Sanford RN (offgoing nurse). Report included the following information SBAR and Kardex    Shift worked:  day     Shift summary and any significant changes:     pod 0, nausea throughtout the evening, zofan given, tolerated therapy     Concerns for physician to address:  nausea     Zone phone for oncoming shift:   [phone]       Activity:  Activity Level: Up with Assistance  Number times ambulated in hallways past shift: 0  Number of times OOB to chair past shift: 0    Cardiac:   Cardiac Monitoring: No      Cardiac Rhythm: AV Paced    Access:   Current line(s): PIV     Genitourinary:   Urinary status: voiding    Respiratory:   O2 Device: None (Room air)  Chronic home O2 use?: NO  Incentive spirometer at bedside: YES       GI:  Last Bowel Movement Date: 03/03/22  Current diet:  ADULT DIET Easy to Chew  Passing flatus: NO  Tolerating current diet: no       Pain Management:   Patient states pain is manageable on current regimen: YES    Skin:  Williams Score: 20  Interventions: increase time out of bed and PT/OT consult    Patient Safety:  Fall Score:  Total Score: 3  Interventions: assistive device (walker, cane, etc) and gripper socks  High Fall Risk: Yes    Length of Stay:  Expected LOS: - - -  Actual LOS: 0      Juhi Sanford RN

## 2022-03-04 NOTE — DISCHARGE SUMMARY
Ortho Discharge Summary    Patient ID:  Andres Blevins  [id number]  female  80 y.o.  1940    Admit date: 3/3/2022    Discharge date: 3/4/2022    Admitting Physician: Gifty Gordon MD     Consulting Physician(s):   Treatment Team: Attending Provider: Nayeli Alvares MD; Utilization Review: Fara Kasper RN; Care Manager: Marco Gilbert    Date of Surgery:   3/3/2022     Preoperative Diagnosis:  LEFT HIP OA    Postoperative Diagnosis:   LEFT HIP OA    Procedure(s):     LEFT TOTAL HIP ARTHROPLASTY  ANTERIOR APPROACH WITH NAVIGATION     Anesthesia Type:   General     Surgeon: Nayeli Alvares MD                            HPI:  Pt is a 80 y.o. female who has a history of LEFT HIP OA  with pain and limitations of activities of daily living who presents at this time for a left BRIGETTE following the failure of conservative management. PMH:   Past Medical History:   Diagnosis Date    Arrhythmia 58    HX TACHYCARDIA, TX ABLATION    Arthritis     BLE, spine, hands    COVID 01/2022    GERD (gastroesophageal reflux disease)     Hiatal hernia     Hypertension     Menopause     LMP-1981    Nausea & vomiting     per Dr. Rosemary Witt - no versed, N2O, ro volatile, Dexamethasone , Zofran, and Phergan used    Rheumatic fever     as a child    [facility] 06/2017    s/p PPM     Takotsubo cardiomyopathy 08/2017    Thyroid disease     HYPOTHYROID       Body mass index is 24.44 kg/m². : A BMI > 30 is classified as obesity and > 40 is classified as morbid obesity. Medications upon admission :   Prior to Admission Medications   Prescriptions Last Dose Informant Patient Reported? Taking? LORazepam (ATIVAN) 0.5 mg tablet 3/2/2022 at Unknown time  No Yes   Sig: Take 1 Tab by mouth every six (6) hours as needed. Max Daily Amount: 2 mg. Do not drive or drink alcohol if taking this medicine.    acetaminophen (TYLENOL) 325 mg tablet 3/2/2022 at Unknown time  Yes Yes   Sig: Take 650 mg by mouth every four (4) hours as needed for Pain. amLODIPine (NORVASC) 2.5 mg tablet 3/2/2022 at Unknown time  Yes Yes   Sig: Take 2.5 mg by mouth every evening. ascorbic acid, vitamin C, (Vitamin C) 1,000 mg tablet 2022 at Unknown time  Yes Yes   Sig: Take 1,000 mg by mouth daily. aspirin 81 mg chewable tablet 2022 at Unknown time  No Yes   Sig: Take 1 Tab by mouth daily. Patient taking differently: Take 81 mg by mouth two (2) times a week. carvediloL (Coreg) 25 mg tablet 3/3/2022 at 0400  Yes Yes   Sig: Take 25 mg by mouth two (2) times daily (with meals). cholecalciferol, vitamin D3, (Vitamin D3) 50 mcg (2,000 unit) tab 2022 at Unknown time  Yes Yes   Sig: Take  by mouth two (2) times a day. cyanocobalamin (Vitamin B-12) 2,500 mcg sublingual tablet 2022 at Unknown time  Yes Yes   Sig: Take 2,500 mcg by mouth daily. diclofenac EC (VOLTAREN) 75 mg EC tablet 2022 at Unknown time  Yes Yes   Sig: Take 75 mg by mouth as needed for Pain.   enalapril (VASOTEC) 20 mg tablet 3/3/2022 at 0400  Yes Yes   Sig: Take 20 mg by mouth two (2) times a day. fexofenadine (ALLEGRA) 180 mg tablet Not Taking at Unknown time  Yes No   Sig: Take 180 mg by mouth daily. Patient not taking: Reported on 3/3/2022   fluticasone (Flonase Sensimist) 27.5 mcg/actuation nasal spray Not Taking at Unknown time  Yes No   Si Sprays by Nasal route daily. Patient not taking: Reported on    folic acid 960 mcg tablet 2022 at Unknown time  Yes Yes   Sig: Take 400 mcg by mouth daily. levothyroxine (SYNTHROID) 112 mcg tablet 3/3/2022 at 0400  Yes Yes   Sig: Take  by mouth six (6) days a week. Not on    metoprolol tartrate (LOPRESSOR) 50 mg tablet Not Taking at Unknown time  Yes No   Sig: Take 50 mg by mouth two (2) times a day. Patient not taking: Reported on 3/3/2022   omeprazole (PRILOSEC) 40 mg capsule 3/3/2022 at 0400  Yes Yes   Sig: Take 40 mg by mouth daily.    potassium 99 mg tablet 2022 at Unknown time  Yes Yes   Sig: Take 99 mg by mouth daily. zinc sulfate (ZINC-15 PO) 2/24/2022 at Unknown time  Yes Yes   Sig: Take  by mouth two (2) times a day. Facility-Administered Medications: None        Allergies: Allergies   Allergen Reactions    Epinephrine Other (comments)     \"severe tachycardia\"     Mold Extracts Unknown (comments)    Morphine Nausea and Vomiting        Hospital Course: The patient underwent surgery. Complications:  None; patient tolerated the procedure well. Was taken to the PACU in stable condition and then transferred to the ortho floor. Perioperative Antibiotics:  Ancef     Postoperative Pain Management:  Tramadol    DVT Prophylaxis: Aspirin 81    Postoperative transfusions:    Number of units banked PRBCs =   none     Post Op complications: none    Hemoglobin at discharge:    Lab Results   Component Value Date/Time    HGB 9.4 (L) 03/04/2022 04:33 AM    INR 1.1 02/22/2022 11:48 AM       Dressing remained clean, dry and intact. No significant erythema or swelling. Neurovascular exam found to be within normal limits. Wound appears to be healing without any evidence of infection. Physical Therapy started following surgery and participated in bed mobility, transfers and ambulation. Gait:  Gait  Base of Support: Widened  Speed/Lizzy: Pace decreased (<100 feet/min),Shuffled,Slow  Step Length: Left shortened,Right shortened  Gait Abnormalities: Antalgic,Decreased step clearance,Step to gait  Ambulation - Level of Assistance: Contact guard assistance  Distance (ft): 150 Feet (ft)  Assistive Device: Gait belt,Walker, rolling                   Discharged to: Home. Condition on Discharge:   stable    Discharge instructions:  - Anticoagulate with Aspirin 81 BID  - Take pain medications as prescribed  - Resume pre hospital diet      - Discharge activity: activity as tolerated  - Ambulate with assistive device as needed.   - Weight bearing status WBAT  - Wound Care Keep wound clean and dry. See discharge instruction sheet. -DISCHARGE MEDICATION LIST     Current Discharge Medication List      START taking these medications    Details   aspirin delayed-release 81 mg tablet Take 1 Tablet by mouth two (2) times a day for 30 days. Qty: 60 Tablet, Refills: 0  Start date: 3/3/2022, End date: 4/2/2022      polyethylene glycol (MIRALAX) 17 gram packet Take 1 Packet by mouth daily for 7 days. Qty: 7 Packet, Refills: 0  Start date: 3/4/2022, End date: 3/11/2022      senna-docusate (PERICOLACE) 8.6-50 mg per tablet Take 1 Tablet by mouth two (2) times a day for 7 days. Qty: 14 Tablet, Refills: 0  Start date: 3/3/2022, End date: 3/10/2022      traMADoL (ULTRAM) 50 mg tablet Take 1-2 Tablets by mouth every six (6) hours as needed for Pain for up to 7 days. Max Daily Amount: 400 mg. One tab for mild to moderate pain level 1-6, or 2 tabs for severe pain level 7-10  Qty: 30 Tablet, Refills: 0  Start date: 3/3/2022, End date: 3/10/2022    Associated Diagnoses: Status post left hip replacement      naloxone (NARCAN) 4 mg/actuation nasal spray Use 1 spray intranasally, then discard. Repeat with new spray every 2 min as needed for opioid overdose symptoms, alternating nostrils. Qty: 1 Each, Refills: 0  Start date: 3/3/2022         CONTINUE these medications which have NOT CHANGED    Details   carvediloL (Coreg) 25 mg tablet Take 25 mg by mouth two (2) times daily (with meals). acetaminophen (TYLENOL) 325 mg tablet Take 650 mg by mouth every four (4) hours as needed for Pain.      enalapril (VASOTEC) 20 mg tablet Take 20 mg by mouth two (2) times a day. omeprazole (PRILOSEC) 40 mg capsule Take 40 mg by mouth daily. levothyroxine (SYNTHROID) 112 mcg tablet Take  by mouth six (6) days a week. Not on Sunday      amLODIPine (NORVASC) 2.5 mg tablet Take 2.5 mg by mouth every evening. ascorbic acid, vitamin C, (Vitamin C) 1,000 mg tablet Take 1,000 mg by mouth daily. folic acid 116 mcg tablet Take 400 mcg by mouth daily. cyanocobalamin (Vitamin B-12) 2,500 mcg sublingual tablet Take 2,500 mcg by mouth daily. cholecalciferol, vitamin D3, (Vitamin D3) 50 mcg (2,000 unit) tab Take  by mouth two (2) times a day. zinc sulfate (ZINC-15 PO) Take  by mouth two (2) times a day. potassium 99 mg tablet Take 99 mg by mouth daily. LORazepam (ATIVAN) 0.5 mg tablet Take 1 Tab by mouth every six (6) hours as needed. Max Daily Amount: 2 mg. Do not drive or drink alcohol if taking this medicine. Qty: 20 Tab, Refills: 0      metoprolol tartrate (LOPRESSOR) 50 mg tablet Take 50 mg by mouth two (2) times a day. fluticasone (Flonase Sensimist) 27.5 mcg/actuation nasal spray 2 Sprays by Nasal route daily. fexofenadine (ALLEGRA) 180 mg tablet Take 180 mg by mouth daily. STOP taking these medications       diclofenac EC (VOLTAREN) 75 mg EC tablet Comments:   Reason for Stopping:         aspirin 81 mg chewable tablet Comments:   Reason for Stopping:            per medical continuation form      -Follow up in office in 2 weeks      Signed:  Terence Graff.  [facility], SUMMERP-BC, ONP-C  Orthopaedic Nurse Practitioner    3/4/2022  3:25 PM

## 2022-03-04 NOTE — PROGRESS NOTES
Transition of Care Plan:  RUR: 5%  Disposition: home with home health- At [address]  Follow up appointments: ortho  DME needed: RW- provided by [facility]   Transportation at Discharge:   Angelita Quintero or means to access home:  to provide  IM Medicare Letter: recieved  Is patient a BCPI-A Bundle: no      If yes, was Bundle Letter given?:    Is patient a [place] and connected with the [facility]?  no             If yes, was [employer] transfer form completed and [facility] notified? Caregiver Contact:  Darrell Patel [phone]  Discharge Caregiver contacted prior to discharge? yes  Care Conference needed?: no                 Reason for Admission: left total hip arthroplasty                     RUR Score:  5%                  Plan for utilizing home health: per recommendation          PCP: First and Last name:  Ovi Mcfadden MD   Name of Practice: [facility]    Are you a current patient: Yes/No: yes   Approximate date of last visit: 2 wk   Can you participate in a virtual visit with your PCP: yes                    Current Advanced Directive/[facility] (ACP) Conversation      Date of Conversation: 3/4/22  Conducted with: Patient with Decision Making Capacity    Content/Action Overview:   DECLINED ACP conversation - will revisit periodically     Healthcare Decision Maker:   Click here to complete [address] including selection of the Healthcare Decision Maker Relationship (ie \"Primary\")                         Transition of Care Plan:                      CM made room visit with patient who was alert and oriented with  at bedside. Pt confirmed demographics, insurance, and emergency contact on file. Pt uses [address] in [place]. Pt and  live in a tri level home with 0 paola. Pt has no DME. Pt will need a RW prior to d/c. At baseline pt is independent with ADLs and driving.  Pt has no hx of HH, SNF, or IPR. Pt's plan is to d/c home with [place]. FOC signed for [facility] and placed on bedside chart. Referral sent to [facility] and accepted. AVS updated. Referral for RW sent to [place], approved and provided to patient at bedside. AVS updated. Care Management Interventions  PCP Verified by CM: Yes  Mode of Transport at Discharge:  Other (see comment) ()  Transition of Care Consult (CM Consult): Discharge [address]: No  Reason Outside [place]: Physician referred to specific agency  MyChart Signup:  (rw)  Discharge Durable Medical Equipment: Yes  Physical Therapy Consult: Yes  Occupational Therapy Consult: Yes  Speech Therapy Consult: No  Support Systems: Spouse/Significant Other  Confirm Follow Up Transport: Family  The Plan for Transition of Care is Related to the Following Treatment Goals : hh  The Patient and/or Patient Representative was Provided with a Choice of Provider and Agrees with the Discharge Plan?: Yes  [facility] of Choice List was Provided with Basic Dialogue that Supports the Patient's Individualized Plan of Care/Goals, Treatment Preferences and Shares the Quality Data Associated with the Providers?: Yes  Discharge Location  Patient Expects to be Discharged to[de-identified] Home with home health    Carle Meckel, [place], [address]

## 2022-03-04 NOTE — PROGRESS NOTES
Problem: Diabetes Self-Management  Goal: *Disease process and treatment process  Description: Define diabetes and identify own type of diabetes; list 3 options for treating diabetes. 3/4/2022 1527 by Benja Alexander RN  Outcome: Resolved/Met  3/4/2022 1527 by Benja Alexander RN  Outcome: Resolved/Met  3/4/2022 1020 by Benja Alexander RN  Outcome: Progressing Towards Goal  Goal: *Incorporating nutritional management into lifestyle  Description: Describe effect of type, amount and timing of food on blood glucose; list 3 methods for planning meals. 3/4/2022 1527 by Benja Alexander RN  Outcome: Resolved/Met  3/4/2022 1527 by Benja Aelxander RN  Outcome: Resolved/Met  3/4/2022 1020 by Benja Alexander RN  Outcome: Progressing Towards Goal  Goal: *Incorporating physical activity into lifestyle  Description: State effect of exercise on blood glucose levels. 3/4/2022 1527 by Benja Alexander RN  Outcome: Resolved/Met  3/4/2022 1527 by Benja Alexander RN  Outcome: Resolved/Met  3/4/2022 1020 by Benja Alexander RN  Outcome: Progressing Towards Goal  Goal: *Developing strategies to promote health/change behavior  Description: Define the ABC's of diabetes; identify appropriate screenings, schedule and personal plan for screenings. 3/4/2022 1527 by Benja Alexander RN  Outcome: Resolved/Met  3/4/2022 1527 by Benja Alexander RN  Outcome: Resolved/Met  3/4/2022 1020 by Benja Alexander RN  Outcome: Progressing Towards Goal  Goal: *Using medications safely  Description: State effect of diabetes medications on diabetes; name diabetes medication taking, action and side effects.   3/4/2022 1527 by Benja Alexander RN  Outcome: Resolved/Met  3/4/2022 1527 by Benja Alexander RN  Outcome: Resolved/Met  3/4/2022 1020 by Benja Alexander RN  Outcome: Progressing Towards Goal  Goal: *Monitoring blood glucose, interpreting and using results  Description: Identify recommended blood glucose targets and personal targets. 3/4/2022 1527 by Madeleine Lerner RN  Outcome: Resolved/Met  3/4/2022 1527 by Madeleine Lerner RN  Outcome: Resolved/Met  3/4/2022 1020 by Madeleine Lerner RN  Outcome: Progressing Towards Goal  Goal: *Prevention, detection, treatment of acute complications  Description: List symptoms of hyper- and hypoglycemia; describe how to treat low blood sugar and actions for lowering  high blood glucose level. 3/4/2022 1527 by Madeleine Lerner RN  Outcome: Resolved/Met  3/4/2022 1527 by Madeleine Lerner RN  Outcome: Resolved/Met  3/4/2022 1020 by Madeleine Lerner RN  Outcome: Progressing Towards Goal  Goal: *Prevention, detection and treatment of chronic complications  Description: Define the natural course of diabetes and describe the relationship of blood glucose levels to long term complications of diabetes.   3/4/2022 1527 by Madeleine Lerner RN  Outcome: Resolved/Met  3/4/2022 1527 by Madeleine Lerner RN  Outcome: Resolved/Met  3/4/2022 1020 by Madeleine Lerner RN  Outcome: Progressing Towards Goal  Goal: *Developing strategies to address psychosocial issues  Description: Describe feelings about living with diabetes; identify support needed and support network  3/4/2022 1527 by Madeleine Lerner RN  Outcome: Resolved/Met  3/4/2022 1527 by Madeleine Lerner RN  Outcome: Resolved/Met  3/4/2022 1020 by Madeleine Lerner RN  Outcome: Progressing Towards Goal  Goal: *Insulin pump training  3/4/2022 1527 by Madeleine Lerner RN  Outcome: Resolved/Met  3/4/2022 1527 by Madeleine Lerner RN  Outcome: Resolved/Met  3/4/2022 1020 by Madeleine Lerner RN  Outcome: Progressing Towards Goal  Goal: *Sick day guidelines  3/4/2022 1527 by Madeleine Lerner RN  Outcome: Resolved/Met  3/4/2022 1527 by Madeleine Lerner RN  Outcome: Resolved/Met  3/4/2022 1020 by Madeleine Lerner RN  Outcome: Progressing Towards Goal  Goal: *Patient Specific Goal (EDIT GOAL, INSERT TEXT)  3/4/2022 1527 by Margarita Henderson RN  Outcome: Resolved/Met  3/4/2022 1527 by Margarita Henderson RN  Outcome: Resolved/Met  3/4/2022 1020 by Margarita Henderson RN  Outcome: Progressing Towards Goal     Problem: Patient Education: Go to Patient Education Activity  Goal: Patient/Family Education  3/4/2022 1527 by Margarita Henderson RN  Outcome: Resolved/Met  3/4/2022 1527 by Margarita Henderson RN  Outcome: Resolved/Met  3/4/2022 1020 by Margarita Henderson RN  Outcome: Progressing Towards Goal     Problem: Falls - Risk of  Goal: *Absence of Falls  Description: Document April Sheriff Fall Risk and appropriate interventions in the flowsheet.   3/4/2022 1527 by Margarita Henderson RN  Outcome: Resolved/Met  Note: Fall Risk Interventions:  Mobility Interventions: Assess mobility with egress test,Communicate number of staff needed for ambulation/transfer,OT consult for ADLs,Patient to call before getting OOB,PT Consult for mobility concerns,PT Consult for assist device competence,Utilize walker, cane, or other assistive device,Utilize gait belt for transfers/ambulation,Strengthening exercises (ROM-active/passive)         Medication Interventions: Assess postural VS orthostatic hypotension,Evaluate medications/consider consulting pharmacy,Patient to call before getting OOB,Teach patient to arise slowly,Utilize gait belt for transfers/ambulation    Elimination Interventions: Call light in reach,Elevated toilet seat,Patient to call for help with toileting needs,Stay With Me (per policy),Toilet paper/wipes in reach,Toileting schedule/hourly rounds           3/4/2022 1527 by Margarita Henderson RN  Outcome: Resolved/Met  Note: Fall Risk Interventions:  Mobility Interventions: Assess mobility with egress test,Communicate number of staff needed for ambulation/transfer,OT consult for ADLs,Patient to call before getting OOB,PT Consult for mobility concerns,PT Consult for assist device competence,Utilize walker, cane, or other assistive device,Utilize gait belt for transfers/ambulation,Strengthening exercises (ROM-active/passive)         Medication Interventions: Assess postural VS orthostatic hypotension,Evaluate medications/consider consulting pharmacy,Patient to call before getting OOB,Teach patient to arise slowly,Utilize gait belt for transfers/ambulation    Elimination Interventions: Call light in reach,Elevated toilet seat,Patient to call for help with toileting needs,Stay With Me (per policy),Toilet paper/wipes in reach,Toileting schedule/hourly rounds           3/4/2022 1020 by Deena Oconnell RN  Outcome: Progressing Towards Goal  Note: Fall Risk Interventions:  Mobility Interventions: Assess mobility with egress test,Communicate number of staff needed for ambulation/transfer,OT consult for ADLs,Patient to call before getting OOB,PT Consult for mobility concerns,Strengthening exercises (ROM-active/passive),Utilize walker, cane, or other assistive device,Utilize gait belt for transfers/ambulation,PT Consult for assist device competence         Medication Interventions: Assess postural VS orthostatic hypotension,Evaluate medications/consider consulting pharmacy,Patient to call before getting OOB,Teach patient to arise slowly,Utilize gait belt for transfers/ambulation    Elimination Interventions: Call light in reach,Elevated toilet seat,Patient to call for help with toileting needs,Stay With Me (per policy),Toilet paper/wipes in reach,Toileting schedule/hourly rounds              Problem: Patient Education: Go to Patient Education Activity  Goal: Patient/Family Education  3/4/2022 1527 by Deena Oconnell RN  Outcome: Resolved/Met  3/4/2022 1527 by Deena Oconnell RN  Outcome: Resolved/Met  3/4/2022 1020 by Deena Oconnell RN  Outcome: Progressing Towards Goal     Problem: Patient Education: Go to Patient Education Activity  Goal: Patient/Family Education  3/4/2022 1527 by Deena Oconnell RN  Outcome: Resolved/Met  3/4/2022 1527 by Ketty Gamez Karlee Cheek RN  Outcome: Resolved/Met  3/4/2022 1020 by Mary Cee RN  Outcome: Progressing Towards Goal     Problem: Patient Education: Go to Patient Education Activity  Goal: Patient/Family Education  3/4/2022 1527 by Mary Cee RN  Outcome: Resolved/Met  3/4/2022 1527 by Mary Cee RN  Outcome: Resolved/Met  3/4/2022 1020 by Mary Cee RN  Outcome: Progressing Towards Goal     Problem: Patient Education: Go to Patient Education Activity  Goal: Patient/Family Education  3/4/2022 1527 by Mary Cee RN  Outcome: Resolved/Met  3/4/2022 1527 by aMry Cee RN  Outcome: Resolved/Met  3/4/2022 1020 by Mary Cee RN  Outcome: Progressing Towards Goal     Problem: Hip Replacement: Day of Surgery/Unit  Goal: Off Pathway (Use only if patient is Off Pathway)  3/4/2022 1527 by Mary Cee RN  Outcome: Resolved/Met  3/4/2022 1527 by Mary Cee RN  Outcome: Resolved/Met  3/4/2022 1020 by Mary Cee RN  Outcome: Progressing Towards Goal  Goal: Activity/Safety  3/4/2022 1527 by Mary Cee RN  Outcome: Resolved/Met  3/4/2022 1527 by Mary Cee RN  Outcome: Resolved/Met  3/4/2022 1020 by Mary Cee RN  Outcome: Progressing Towards Goal  Goal: Consults, if ordered  3/4/2022 1527 by Mary Cee RN  Outcome: Resolved/Met  3/4/2022 1527 by Mary Cee RN  Outcome: Resolved/Met  3/4/2022 1020 by Mary Cee RN  Outcome: Progressing Towards Goal  Goal: Diagnostic Test/Procedures  3/4/2022 1527 by Mary Cee RN  Outcome: Resolved/Met  3/4/2022 1527 by Mary Cee RN  Outcome: Resolved/Met  3/4/2022 1020 by Mary Cee RN  Outcome: Progressing Towards Goal  Goal: Nutrition/Diet  3/4/2022 1527 by Mary Cee RN  Outcome: Resolved/Met  3/4/2022 1527 by Mary Cee RN  Outcome: Resolved/Met  3/4/2022 1020 by Mary Cee RN  Outcome: Progressing Towards Goal  Goal: Medications  3/4/2022 1527 by Pastor Stiles RN  Outcome: Resolved/Met  3/4/2022 1527 by Pastor Stiles RN  Outcome: Resolved/Met  3/4/2022 1020 by Pastor Stiles RN  Outcome: Progressing Towards Goal  Goal: Respiratory  3/4/2022 1527 by Pastor Stiles, RN  Outcome: Resolved/Met  3/4/2022 1527 by Pastor Stiles, RN  Outcome: Resolved/Met  3/4/2022 1020 by Pastor Stiles RN  Outcome: Progressing Towards Goal  Goal: Treatments/Interventions/Procedures  3/4/2022 1527 by Pastor Stiles RN  Outcome: Resolved/Met  3/4/2022 1527 by Pastor Stiles RN  Outcome: Resolved/Met  3/4/2022 1020 by Pastor Stiles RN  Outcome: Progressing Towards Goal  Goal: Psychosocial  3/4/2022 1527 by Pastor Stiles, RN  Outcome: Resolved/Met  3/4/2022 1527 by Pastor Stiles RN  Outcome: Resolved/Met  3/4/2022 1020 by Pastor Stiles RN  Outcome: Progressing Towards Goal  Goal: *Initiate mobility  3/4/2022 1527 by Pastor Stiles, RN  Outcome: Resolved/Met  3/4/2022 1527 by Pastor Stiles RN  Outcome: Resolved/Met  3/4/2022 1020 by Pastor Stiles RN  Outcome: Progressing Towards Goal  Goal: *Optimal pain control at patient's stated goal  3/4/2022 1527 by Pastor Stiles, RN  Outcome: Resolved/Met  3/4/2022 1527 by Pastor Stiles RN  Outcome: Resolved/Met  3/4/2022 1020 by Pastor Stiles RN  Outcome: Progressing Towards Goal  Goal: *Hemodynamically stable  3/4/2022 1527 by Pastor Stiles RN  Outcome: Resolved/Met  3/4/2022 1527 by Pastor Stiles RN  Outcome: Resolved/Met  3/4/2022 1020 by Pastor Stiles RN  Outcome: Progressing Towards Goal     Problem: Hip Replacement: Post Op Day 1  Goal: Off Pathway (Use only if patient is Off Pathway)  3/4/2022 1527 by Pastor Stiles RN  Outcome: Resolved/Met  3/4/2022 1527 by Pastor Stiles RN  Outcome: Resolved/Met  3/4/2022 1020 by Pastor Stiles RN  Outcome: Progressing Towards Goal  Goal: Activity/Safety  3/4/2022 1527 by Fatuma Lombardi RN  Outcome: Resolved/Met  3/4/2022 1527 by Fatuma Lombardi RN  Outcome: Resolved/Met  3/4/2022 1020 by Fatuma Lombardi RN  Outcome: Progressing Towards Goal  Goal: Diagnostic Test/Procedures  3/4/2022 1527 by Fatuma Lombardi, RN  Outcome: Resolved/Met  3/4/2022 1527 by Fatuma Lombardi RN  Outcome: Resolved/Met  3/4/2022 1020 by Fatuma Lombardi RN  Outcome: Progressing Towards Goal  Goal: Nutrition/Diet  3/4/2022 1527 by Fatuma Lombardi RN  Outcome: Resolved/Met  3/4/2022 1527 by Fatuma Lombardi RN  Outcome: Resolved/Met  3/4/2022 1020 by Fatuma Lombardi RN  Outcome: Progressing Towards Goal  Goal: Medications  3/4/2022 1527 by Fatuma Lombardi, RN  Outcome: Resolved/Met  3/4/2022 1527 by Fatuma Lombardi RN  Outcome: Resolved/Met  3/4/2022 1020 by Fatuma Lombardi RN  Outcome: Progressing Towards Goal  Goal: Respiratory  3/4/2022 1527 by Fatuma Lombardi, GEOVANNY  Outcome: Resolved/Met  3/4/2022 1527 by Fatuma Lombardi RN  Outcome: Resolved/Met  3/4/2022 1020 by Fatuma Lombardi RN  Outcome: Progressing Towards Goal  Goal: Treatments/Interventions/Procedures  3/4/2022 1527 by Fatuma Lombardi RN  Outcome: Resolved/Met  3/4/2022 1527 by Fatuma Lombardi RN  Outcome: Resolved/Met  3/4/2022 1020 by Fatuma Lombardi RN  Outcome: Progressing Towards Goal  Goal: Psychosocial  3/4/2022 1527 by Fatuma Lombardi, RN  Outcome: Resolved/Met  3/4/2022 1527 by Fatuma Lombardi RN  Outcome: Resolved/Met  3/4/2022 1020 by Fatuma Lombardi RN  Outcome: Progressing Towards Goal  Goal: Discharge Planning  3/4/2022 1527 by Fatuma Lombardi, RN  Outcome: Resolved/Met  3/4/2022 1527 by Fatuma Lombardi RN  Outcome: Resolved/Met  3/4/2022 1020 by Fatuma Lombardi RN  Outcome: Progressing Towards Goal  Goal: *Demonstrates progressive activity  3/4/2022 1527 by Fatuma Lombardi RN  Outcome: Resolved/Met  3/4/2022 1527 by Fatuma Lombardi RN  Outcome: Resolved/Met  3/4/2022 1020 by Mitra Crane, RN  Outcome: Progressing Towards Goal  Goal: *Optimal pain control at patient's stated goal  3/4/2022 1527 by Mitra Crane, RN  Outcome: Resolved/Met  3/4/2022 1527 by Mitra Crane, RN  Outcome: Resolved/Met  3/4/2022 1020 by Mitra Crane, RN  Outcome: Progressing Towards Goal  Goal: *Hemodynamically stable  3/4/2022 1527 by Mitra Crane, RN  Outcome: Resolved/Met  3/4/2022 1527 by Mitra Crane, RN  Outcome: Resolved/Met  3/4/2022 1020 by Mitra Crane, RN  Outcome: Progressing Towards Goal  Goal: *Discharge plan identified  3/4/2022 1527 by Mitra Crane, RN  Outcome: Resolved/Met  3/4/2022 1527 by Mitra Crane, RN  Outcome: Resolved/Met  3/4/2022 1020 by Mitra Crane, RN  Outcome: Progressing Towards Goal     Problem: Hip Replacement: Post-Op Day 2  Goal: Off Pathway (Use only if patient is Off Pathway)  3/4/2022 1527 by Mitra Crane, RN  Outcome: Resolved/Met  3/4/2022 1527 by Mitra Crane, RN  Outcome: Resolved/Met  3/4/2022 1020 by Mitra Crane, RN  Outcome: Progressing Towards Goal  Goal: Activity/Safety  3/4/2022 1527 by Mitra Crane, RN  Outcome: Resolved/Met  3/4/2022 1527 by Mitra Crane, RN  Outcome: Resolved/Met  3/4/2022 1020 by Mitra Crane, RN  Outcome: Progressing Towards Goal  Goal: Diagnostic Test/Procedures  3/4/2022 1527 by Mitra Crane, RN  Outcome: Resolved/Met  3/4/2022 1527 by Mitra Crane, RN  Outcome: Resolved/Met  3/4/2022 1020 by Mitra Crane, RN  Outcome: Progressing Towards Goal  Goal: Nutrition/Diet  3/4/2022 1527 by Mitra Crane, RN  Outcome: Resolved/Met  3/4/2022 1527 by Mitra Crane, RN  Outcome: Resolved/Met  3/4/2022 1020 by Mitra Crane, RN  Outcome: Progressing Towards Goal  Goal: Medications  3/4/2022 1527 by Mitra Crane RN  Outcome: Resolved/Met  3/4/2022 1527 by Mitra Crane RN  Outcome: Resolved/Met  3/4/2022 1020 by Saira Kennedy, RN  Outcome: Progressing Towards Goal  Goal: Respiratory  3/4/2022 1527 by Saira Kennedy, RN  Outcome: Resolved/Met  3/4/2022 1527 by Saira Kennedy, RN  Outcome: Resolved/Met  3/4/2022 1020 by Saira Kennedy, RN  Outcome: Progressing Towards Goal  Goal: Treatments/Interventions/Procedures  3/4/2022 1527 by Saira Kennedy, RN  Outcome: Resolved/Met  3/4/2022 1527 by Saira Kennedy, RN  Outcome: Resolved/Met  3/4/2022 1020 by Saira Kennedy, RN  Outcome: Progressing Towards Goal  Goal: Psychosocial  3/4/2022 1527 by Saira Kennedy, RN  Outcome: Resolved/Met  3/4/2022 1527 by Saira Kennedy, RN  Outcome: Resolved/Met  3/4/2022 1020 by Saira Kennedy, RN  Outcome: Progressing Towards Goal  Goal: *Met physical therapy criteria for discharge to the next level of care  3/4/2022 1527 by Saira Kennedy, RN  Outcome: Resolved/Met  3/4/2022 1527 by Saira Kennedy, RN  Outcome: Resolved/Met  3/4/2022 1020 by Saira Kennedy, RN  Outcome: Progressing Towards Goal  Goal: *Optimal pain control with oral analgesia  3/4/2022 1527 by Saira Kennedy, RN  Outcome: Resolved/Met  3/4/2022 1527 by Saira eKnnedy, RN  Outcome: Resolved/Met  3/4/2022 1020 by Saira Kennedy, RN  Outcome: Progressing Towards Goal  Goal: *Hemodynamically stable  3/4/2022 1527 by Saira Kennedy, RN  Outcome: Resolved/Met  3/4/2022 1527 by Saira Kennedy, RN  Outcome: Resolved/Met  3/4/2022 1020 by Saira Kennedy, RN  Outcome: Progressing Towards Goal  Goal: *Tolerating diet  3/4/2022 1527 by Saira Kennedy, RN  Outcome: Resolved/Met  3/4/2022 1527 by Saira Kennedy, RN  Outcome: Resolved/Met  3/4/2022 1020 by Saira Kennedy, RN  Outcome: Progressing Towards Goal  Goal: *Verbalizes understanding of any indicated hip precautions  3/4/2022 1527 by Saira Kennedy RN  Outcome: Resolved/Met  3/4/2022 1527 by Saira Kennedy RN  Outcome: Resolved/Met  3/4/2022 1020 by Maty Oshea RN  Outcome: Progressing Towards Goal  Goal: *Patient verbalizes understanding of discharge instructions  3/4/2022 1527 by Maty Oshea RN  Outcome: Resolved/Met  3/4/2022 1527 by Maty Oshea RN  Outcome: Resolved/Met  3/4/2022 1020 by Maty Oshea RN  Outcome: Progressing Towards Goal

## 2022-03-04 NOTE — PROGRESS NOTES
Problem: Mobility Impaired (Adult and Pediatric)  Goal: *Acute Goals and Plan of Care (Insert Text)  Description: FUNCTIONAL STATUS PRIOR TO ADMISSION: Independent household and community distances without device    HOME SUPPORT PRIOR TO ADMISSION: Lives with  in tri-level home, 4 [address]BE[address] with rails. Able to enter in bottom level without steps to stay in den if needed. Physical Therapy Goals  Initiated 3/3/2022    1. Patient will move from supine to sit and sit to supine , scoot up and down, and roll side to side in bed with modified independence within 4 days. 2. Patient will perform sit to stand with supervision/set-up within 4 days. 3. Patient will ambulate with supervision/set-up for 50 feet with the least restrictive device within 4 days. 4. Patient will ascend/descend 4 stairs with B handrail(s) with minimal assistance/contact guard assist within 4 days. 5. Patient will verbalize and demonstrate understanding of hip precautions per protocol within 4 days. 6. Patient will perform hip home exercise program per protocol with supervision/set-up within 4 days. Outcome: Progressing Towards Goal   PHYSICAL THERAPY TREATMENT  Patient: Anabela Ríos (89 y.o. female)  Date: 3/4/2022  Diagnosis: S/P hip replacement [Z96.649] <principal problem not specified>  Procedure(s) (LRB):  LEFT TOTAL HIP ARTHROPLASTY  ANTERIOR APPROACH WITH NAVIGATION (Left) 1 Day Post-Op  Precautions:  Fall, hip precautions (anterior)  Chart, physical therapy assessment, plan of care and goals were reviewed. ASSESSMENT  Patient continues with skilled PT services and is progressing towards goals. Pt presents with increased pain levels in L hip with mobility, decreased activity tolerance, decreased strength & ROM, impaired balance, and overall impaired functional mobility. Pt received in supine and needing encouragement to participate with therapy however agreeable.  Pt requiring additional time due to complaints of increased pain however able to transition from supine to sitting CGA. Pt moving overall CGA and able to ambulate to bathroom for toileting and perform hand hygiene CGA with no loss of balance. Ambulated 75ft CGA w/RW and pt complaining of 8/10 with mobility deferring further therapy at this time. Pt left seated in chair with daughter present and all needs met. VSS throughout treatment. Anticipate pt performing stair/car transfer training in second session with improved pain management. Current Level of Function Impacting Discharge (mobility/balance): CGA     Other factors to consider for discharge: pain management         PLAN :  Patient continues to benefit from skilled intervention to address the above impairments. Continue treatment per established plan of care. to address goals. Recommendation for discharge: (in order for the patient to meet his/her long term goals)  Physical therapy at least 2 days/week in the home AND ensure assist and/or supervision for safety with mobility & ADL's    This discharge recommendation:  Has been made in collaboration with the attending provider and/or case management    IF patient discharges home will need the following DME: rolling walker       SUBJECTIVE:   Patient stated i'm [place], i love to cook.     OBJECTIVE DATA SUMMARY:   Critical Behavior:  Neurologic State: Alert,Eyes open spontaneously  Orientation Level: Oriented X4  Cognition: Follows commands  Safety/Judgement: Decreased awareness of need for assistance,Decreased awareness of need for safety,Fall prevention  Functional Mobility Training:  Bed Mobility:  Rolling: Contact guard assistance  Supine to Sit: Contact guard assistance; Additional time     Scooting: Supervision; Additional time     Transfers:  Sit to Stand: Contact guard assistance  Stand to Sit: Contact guard assistance    Balance:  Sitting: Impaired  Sitting - Static: Fair (occasional)  Sitting - Dynamic: Fair (occasional)  Standing: Intact; With support  Standing - Static: Fair;Constant support  Standing - Dynamic : Fair;Constant support  Ambulation/Gait Training:  Distance (ft): 75 Feet (ft)  Assistive Device: Gait belt;Walker, rolling  Ambulation - Level of Assistance: Contact guard assistance     Gait Abnormalities: Antalgic;Decreased step clearance     Base of Support: Widened     Speed/Lizzy: Pace decreased (<100 feet/min); Shuffled; Slow  Step Length: Left shortened;Right shortened    Therapeutic Exercises:   SUPINE  EXERCISES   Sets   Reps   Active Active Assist   Passive Self ROM   Comments   Ankle Pumps 1 10 [x]                                        []                                        []                                        []                                           Quad Sets 1 5 [x]                                        []                                        []                                        []                                           Heel Slides 1 5 []                                        [x]                                        []                                        []                                           Hip Abduction 1 5 []                                        [x]                                        []                                        []                                           Glut Sets 1 5 [x]                                        []                                        []                                        []                                              []                                        []                                        []                                        []                                              []                                        []                                        []                                        []                                               Pain Ratin/10 L hp pain with mobility    Activity Tolerance:   Fair      After treatment patient left in no apparent distress:   Sitting in chair, Call bell within reach, and Caregiver / family present    COMMUNICATION/COLLABORATION:   The patients plan of care was discussed with: Nurse Practitioner & Registered nurse.      Jenaro Goff PTA   Time Calculation: 45 mins

## 2022-03-18 PROBLEM — K21.9 GERD (GASTROESOPHAGEAL REFLUX DISEASE): Status: ACTIVE | Noted: 2017-08-11

## 2022-03-18 PROBLEM — Z96.649 S/P HIP REPLACEMENT: Status: ACTIVE | Noted: 2022-03-03

## 2022-03-19 PROBLEM — T82.110A PACEMAKER LEAD MALFUNCTION: Status: ACTIVE | Noted: 2017-06-30

## 2022-03-19 PROBLEM — I21.4 NSTEMI (NON-ST ELEVATED MYOCARDIAL INFARCTION) (HCC): Status: ACTIVE | Noted: 2017-08-11

## 2022-03-19 PROBLEM — Z95.0 HISTORY OF PERMANENT CARDIAC PACEMAKER PLACEMENT: Status: ACTIVE | Noted: 2017-08-11

## 2022-03-20 PROBLEM — I10 HTN (HYPERTENSION): Status: ACTIVE | Noted: 2017-08-11

## 2022-07-21 ENCOUNTER — HOSPITAL ENCOUNTER (OUTPATIENT)
Dept: MAMMOGRAPHY | Age: 82
Discharge: HOME | End: 2022-07-21
Attending: OBSTETRICS & GYNECOLOGY
Payer: MEDICARE

## 2022-07-21 DIAGNOSIS — Z12.31 VISIT FOR SCREENING MAMMOGRAM: ICD-10-CM

## 2022-07-21 PROCEDURE — 77067 SCR MAMMO BI INCL CAD: CPT

## 2023-02-17 ENCOUNTER — HOSPITAL ENCOUNTER (OUTPATIENT)
Dept: PREADMISSION TESTING | Age: 83
Discharge: HOME | End: 2023-02-17
Attending: ORTHOPAEDIC SURGERY
Payer: MEDICARE

## 2023-02-17 VITALS
OXYGEN SATURATION: 100 % | RESPIRATION RATE: 18 BRPM | SYSTOLIC BLOOD PRESSURE: 156 MMHG | TEMPERATURE: 97.8 F | BODY MASS INDEX: 27.44 KG/M2 | HEART RATE: 68 BPM | DIASTOLIC BLOOD PRESSURE: 53 MMHG | HEIGHT: 60 IN | WEIGHT: 139.77 LBS

## 2023-02-17 LAB
ABO + RH BLD: NORMAL
ALBUMIN SERPL-MCNC: 3.5 G/DL (ref 3.5–5)
ALBUMIN/GLOB SERPL: 1 (ref 1.1–2.2)
ALP SERPL-CCNC: 108 U/L (ref 45–117)
ALT SERPL-CCNC: 19 U/L (ref 12–78)
ANION GAP SERPL CALC-SCNC: 6 MMOL/L (ref 5–15)
APPEARANCE UR: CLEAR
AST SERPL-CCNC: 20 U/L (ref 15–37)
BACTERIA URNS QL MICRO: NEGATIVE /HPF
BILIRUB SERPL-MCNC: 0.3 MG/DL (ref 0.2–1)
BILIRUB UR QL: NEGATIVE
BLOOD GROUP ANTIBODIES SERPL: NORMAL
BUN SERPL-MCNC: 13 MG/DL (ref 6–20)
BUN/CREAT SERPL: 15 (ref 12–20)
CALCIUM SERPL-MCNC: 8.7 MG/DL (ref 8.5–10.1)
CHLORIDE SERPL-SCNC: 106 MMOL/L (ref 97–108)
CO2 SERPL-SCNC: 27 MMOL/L (ref 21–32)
COLOR UR: NORMAL
CREAT SERPL-MCNC: 0.84 MG/DL (ref 0.55–1.02)
EPITH CASTS URNS QL MICRO: NORMAL /LPF
ERYTHROCYTE [DISTWIDTH] IN BLOOD BY AUTOMATED COUNT: 13.4 % (ref 11.5–14.5)
EST. AVERAGE GLUCOSE BLD GHB EST-MCNC: 103 MG/DL
GLOBULIN SER CALC-MCNC: 3.6 G/DL (ref 2–4)
GLUCOSE SERPL-MCNC: 96 MG/DL (ref 65–100)
GLUCOSE UR STRIP.AUTO-MCNC: NEGATIVE MG/DL
HBA1C MFR BLD: 5.2 % (ref 4–5.6)
HCT VFR BLD AUTO: 33.3 % (ref 35–47)
HGB BLD-MCNC: 11 G/DL (ref 11.5–16)
HGB UR QL STRIP: NEGATIVE
INR PPP: 1.1 (ref 0.9–1.1)
KETONES UR QL STRIP.AUTO: NEGATIVE MG/DL
LEUKOCYTE ESTERASE UR QL STRIP.AUTO: NEGATIVE
MCH RBC QN AUTO: 30.6 PG (ref 26–34)
MCHC RBC AUTO-ENTMCNC: 33 G/DL (ref 30–36.5)
MCV RBC AUTO: 92.5 FL (ref 80–99)
NITRITE UR QL STRIP.AUTO: NEGATIVE
NRBC # BLD: 0 K/UL (ref 0–0.01)
NRBC BLD-RTO: 0 PER 100 WBC
OTHER,OTHU: NORMAL
PH UR STRIP: 6 (ref 5–8)
PLATELET # BLD AUTO: 306 K/UL (ref 150–400)
PMV BLD AUTO: 9.5 FL (ref 8.9–12.9)
POTASSIUM SERPL-SCNC: 3.4 MMOL/L (ref 3.5–5.1)
PROT SERPL-MCNC: 7.1 G/DL (ref 6.4–8.2)
PROT UR STRIP-MCNC: NEGATIVE MG/DL
PROTHROMBIN TIME: 11 SEC (ref 9–11.1)
RBC # BLD AUTO: 3.6 M/UL (ref 3.8–5.2)
RBC #/AREA URNS HPF: NORMAL /HPF (ref 0–5)
SODIUM SERPL-SCNC: 139 MMOL/L (ref 136–145)
SP GR UR REFRACTOMETRY: 1.01
SPECIMEN EXP DATE BLD: NORMAL
UA: UC IF INDICATED,UAUC: NORMAL
UROBILINOGEN UR QL STRIP.AUTO: 0.2 EU/DL (ref 0.2–1)
WBC # BLD AUTO: 6.6 K/UL (ref 3.6–11)
WBC URNS QL MICRO: NORMAL /HPF (ref 0–4)

## 2023-02-17 PROCEDURE — 85610 PROTHROMBIN TIME: CPT

## 2023-02-17 PROCEDURE — 86900 BLOOD TYPING SEROLOGIC ABO: CPT

## 2023-02-17 PROCEDURE — 85027 COMPLETE CBC AUTOMATED: CPT

## 2023-02-17 PROCEDURE — 80053 COMPREHEN METABOLIC PANEL: CPT

## 2023-02-17 PROCEDURE — 81001 URINALYSIS AUTO W/SCOPE: CPT

## 2023-02-17 PROCEDURE — 83036 HEMOGLOBIN GLYCOSYLATED A1C: CPT

## 2023-02-17 PROCEDURE — 36415 COLL VENOUS BLD VENIPUNCTURE: CPT

## 2023-02-17 RX ORDER — OXYMETAZOLINE HCL 0.05 %
2 SPRAY, NON-AEROSOL (ML) NASAL AS NEEDED
COMMUNITY

## 2023-02-17 RX ORDER — PYRIDOXINE HCL (VITAMIN B6) 100 MG
100 TABLET ORAL 2 TIMES DAILY
COMMUNITY

## 2023-02-17 RX ORDER — HYDROCHLOROTHIAZIDE 25 MG/1
25 TABLET ORAL AS NEEDED
COMMUNITY

## 2023-02-17 RX ORDER — TRAMADOL HYDROCHLORIDE 50 MG/1
50 TABLET ORAL
COMMUNITY

## 2023-02-17 NOTE — PERIOP NOTES
Hibiclens/Chlorhexidine    Preventing Infections Before and After - Your Surgery    IMPORTANT INSTRUCTIONS    Please read and follow these instructions carefully. If you are unable to comply with the below instructions your procedure will be cancelled. Every Night for Three (3) nights before your surgery:  Shower with an antibacterial soap, such as Dial, or the soap provided at your preassessment appointment. A shower is better than a bath for cleaning your skin. If needed, ask someone to help you reach all areas of your body. Dont forget to clean your belly button with every shower. The night before your surgery: If you lose your Hibiclens/chlorhexidine please contact surgery center or you can purchase it at a local pharmacy  On the night before your surgery, shower with an antibacterial soap, such as Dial, or the soap provided at your preassessment appointment. With one packet of Hibiclens/Chlorhexidine in hand, turn water off. Apply Hibiclens antiseptic skin cleanser with a clean, freshly washed washcloth. Gently apply to your body from chin to toes (except the genital area) and especially the area(s) where your incision(s) will be. Leave Hibiclens/Chlorhexidine on your skin for at least 20 seconds. CAUTION: If needed, Hibiclens/chlorhexidine may be used to clean the folds of skin of the legs (such as in the area of the groin) and on your buttocks and hips. However, do not use Hibiclens/Chlorhexidine above the neck or in the genital area (your bottom) or put inside any area of your body. Turn the water back on and rinse. Dry gently with a clean, freshly washed towel. After your shower, do not use any powder, deodorant, perfumes or lotion. Use clean, freshly washed towels and washcloths every time you shower. Wear clean, freshly washed pajamas to bed the night before surgery. Sleep on clean, freshly washed sheets. Do not allow pets to sleep in your bed with you.         The Morning of your surgery:  Shower again thoroughly with an antibacterial soap, such as Dial or the soap provided at your preassessment appointment. If needed, ask someone for help to reach all areas of your body. Dont forget to clean your belly button! Rinse. Dry gently with a clean, freshly washed towel. After your shower, do not use any powder, deodorant, perfumes or lotion prior to surgery. Put on clean, freshly washed clothing. Tips to help prevent infections after your surgery:  Protect your surgical wound from germs:  Hand washing is the most important thing you and your caregivers can do to prevent infections. Keep your bandage clean and dry! Do not touch your surgical wound. Use clean, freshly washed towels and washcloths every time you shower; do not share bath linens with others. Until your surgical wound is healed, wear clothing and sleep on bed linens each day that are clean and freshly washed. Do not allow pets to sleep in your bed with you or touch your surgical wound. Do not smoke - smoking delays wound healing. This may be a good time to stop smoking. If you have diabetes, it is important for you to manage your blood sugar levels properly before your surgery as well as after your surgery. Poorly managed blood sugar levels slow down wound healing and prevent you from healing completely. If you lose your Hibiclens/chlorhexidine, please call the [facility], or it is available for purchase at your pharmacy.                ___________________      ___________________      ________________  (Signature of Patient)          (Witness)                   (Date and Time)

## 2023-02-17 NOTE — PERIOP NOTES

## 2023-02-17 NOTE — PERIOP NOTES

## 2023-02-17 NOTE — PERIOP NOTES
EKG not done during PAT visit. Last EKG done during cardiac clearance appt on 2/14/23. EKG tracing on chart.

## 2023-02-17 NOTE — PERIOP NOTES
[facility]  Joint/Spine Preoperative Instructions        Surgery Date 2/24/23          Time of Arrival to be called at [phone]    1. On the day of your surgery, please report to the Surgical Services Registration Desk and sign in at your designated time. The Surgery Center is located to the right of the Emergency Room. 2. You must have someone with you to drive you home. You should not drive a car for 24 hours following surgery. Please make arrangements for a friend or family member to stay with you for the first 24 hours after your surgery. 3. No food after midnight. Medications morning of surgery should be taken with a sip of water. Please follow pre-surgery drink instructions that were given at your Pre Admission Testing appointment. 4. We recommend you do not drink any alcoholic beverages for 24 hours before and after your surgery. 5. Contact your surgeons office for instructions on the following medications: non-steroidal anti-inflammatory drugs (i.e. Advil, Aleve), vitamins, and supplements. (Some surgeons will want you to stop these medications prior to surgery and others may allow you to take them)  **If you are currently taking Plavix, Coumadin, Aspirin and/or other blood-thinning agents, contact your surgeon for instructions. ** Your surgeon will partner with the physician prescribing these medications to determine if it is safe to stop or if you need to continue taking. Please do not stop taking these medications without instructions from your surgeon    6. Wear comfortable clothes. Wear glasses instead of contacts. Do not bring any money or jewelry. Please bring picture ID, insurance card, and any prearranged co-payment or hospital payment. Do not wear make-up, particularly mascara the morning of your surgery. Do not wear nail polish, particularly if you are having foot /hand surgery.   Wear your hair loose or down, no ponytails, buns, tamar pins or clips. All body piercings must be removed. Please shower with antibacterial soap for three consecutive days before and on the morning of surgery, but do not apply any lotions, powders or deodorants after the shower on the day of surgery. Please use a fresh towels after each shower. Please sleep in clean clothes and change bed linens the night before surgery. Please do not shave for 48 hours prior to surgery. Shaving of the face is acceptable. 7. You should understand that if you do not follow these instructions your surgery may be cancelled. If your physical condition changes (I.e. fever, cold or flu) please contact your surgeon as soon as possible. 8. It is important that you be on time. If a situation occurs where you may be late, please call [phone] (OR Holding Area). 9. If you have any questions and or problems, please call [phone] (Pre-admission Testing). 10. Your surgery time may be subject to change. You will receive a phone call the evening prior with your time of arrival.    11.  If having outpatient surgery, you must have someone to drive you here, stay with you during the duration of your stay, and to drive you home at time of discharge. 12. The following link is for the educational video for patients and/or families. http://dominguez-molina.org/. com/locations/rkmaxbojp-aqzyghu-ukrtsig/[place]/[facility]/educational-materials    Special Instructions: Hold vitamins and supplements 5 days prior to surgery      TAKE ALL MEDICATIONS THE DAY OF SURGERY EXCEPT: vitamins and supplements, tramadol, . May take other medications with a sip of water. I understand a pre-operative phone call will be made to verify my surgery time. In the event that I am not available, I give permission for a message to be left on my answering service and/or with another person?   yes         ___________________      __________   _________    Sharmila Gather of Patient) (Witness)                (Date and Time)

## 2023-02-19 LAB
BACTERIA SPEC CULT: NORMAL
BACTERIA SPEC CULT: NORMAL
SERVICE CMNT-IMP: NORMAL

## 2023-02-20 NOTE — ADVANCED PRACTICE NURSE
PAT Nurse Practitioner   Pre-Operative Chart Review/Assessment:-ORTHOPEDIC/NEUROSURGICAL SPINE                Patient Name:  Suly Morgan                                                           Age:   [de-identified] y.o.    :  1940     Today's Date:  2023     Date of PAT:   23      Date of Surgery:    2023      Procedure(s):  Right  Unicondylar Knee Arthroplasty     Surgeon:   Caro Navarro     Primary Care Provider:    Angel Moraes NP                    PLAN:      1)  Cardiac Clearance:  Mina Hamilton NP        2)  Sleep apnea screening:  Has prior dx-not treated       3)   Program for Diabetes Health Consult:  Not indicated-A1C 5.2       4) Treatment for MRSA/MSSA:  Negative       5) Additional Concerns:  Takotsubo cardiomyopathy, SSS s/p PPM , SVT s/p ablation x 2 , asthma, prior dx of RAMÍREZ (untreated), hypothyroid, prediabetes, PONV.  S/P L BRIGETTE 3/3/2022                Vital Signs:         Visit Vitals  BP (!) 156/53 (BP 1 Location: Left upper arm, BP Patient Position: At rest;Sitting)   Pulse 68   Temp 97.8 °F (36.6 °C)   Resp 18   Ht 5' (1.524 m)   Wt 63.4 kg (139 lb 12.4 oz)   SpO2 100%   BMI 27.30 kg/m²                        ____________________________________________  PAST MEDICAL HISTORY  Past Medical History:   Diagnosis Date    Arrhythmia     SVT ablation (x 2) @ [facility] Dr. Maria R Graves, spine, hands    Asthma     as a child    COVID 2022    GERD (gastroesophageal reflux disease)     Hiatal hernia     History of heart attack 2017    anxiety heart attack per pt    Hypertension     Menopause     LMP-    Nausea & vomiting     per Dr. Aiyana Lopes - no versed, N2O, ro volatile, Dexamethasone , Zofran, and Phergan used    Pacemaker 2017    [employer] Dual chamber PPM    Rheumatic fever     as a child    Sleep apnea     \"mild previously per pt\" (per cardiology office notes)    SSS (sick sinus syndrome) ([address] 2017    s/p PPM     Takotsubo cardiomyopathy 2017 Thromboembolus ([address]     in stomach per pt    Thyroid disease     HYPOTHYROID      ____________________________________________  PAST SURGICAL HISTORY  Past Surgical History:   Procedure Laterality Date    HX APPENDECTOMY      HX CATARACT REMOVAL Bilateral     HX  SECTION        x3, hysterectomy    HX CHOLECYSTECTOMY      HX HEENT      tonsillectomy    HX HYSTERECTOMY  1981    HX ORTHOPAEDIC      right knee orthoscopic, right wrist surgery    HX ORTHOPAEDIC Bilateral     finger surgery for arthritis and swelling    HX PACEMAKER Left 2017    [place] UNLISTED PROCEDURE LUNGS & PLEURA Left 2017    Pacemaker Placed      ____________________________________________  HOME MEDICATIONS    Current Outpatient Medications   Medication Sig    oxymetazoline (AFRIN) 0.05 % nasal spray 2 Sprays as needed. For nose bleeds    hydroCHLOROthiazide (HYDRODIURIL) 25 mg tablet Take 25 mg by mouth as needed. traMADoL (ULTRAM) 50 mg tablet Take 50 mg by mouth every six (6) hours as needed for Pain.    pyridoxine, vitamin B6, (Vitamin B-6) 100 mg tablet Take 100 mg by mouth two (2) times a day. carvediloL (COREG) 25 mg tablet Take 25 mg by mouth two (2) times daily (with meals). acetaminophen (TYLENOL) 325 mg tablet Take 650 mg by mouth every four (4) hours as needed for Pain.    enalapril (VASOTEC) 20 mg tablet Take 20 mg by mouth two (2) times a day. omeprazole (PRILOSEC) 40 mg capsule Take 40 mg by mouth daily. levothyroxine (SYNTHROID) 112 mcg tablet Take  by mouth six (6) days a week. Not on     ascorbic acid, vitamin C, (VITAMIN C) 1,000 mg tablet Take 1,000 mg by mouth daily. fluticasone (Flonase Sensimist) 27.5 mcg/actuation nasal spray 2 Sprays by Nasal route as needed. cyanocobalamin (VITAMIN B12) 2,500 mcg sublingual tablet Take 1,000 mcg by mouth daily. cholecalciferol, vitamin D3, 50 mcg (2,000 unit) tab Take  by mouth two (2) times a day.     potassium 99 mg tablet Take 99 mg by mouth. Takes from time to time per pt    LORazepam (ATIVAN) 0.5 mg tablet Take 1 Tab by mouth every six (6) hours as needed. Max Daily Amount: 2 mg. Do not drive or drink alcohol if taking this medicine. naloxone (NARCAN) 4 mg/actuation nasal spray Use 1 spray intranasally, then discard. Repeat with new spray every 2 min as needed for opioid overdose symptoms, alternating nostrils. folic acid 530 mcg tablet Take 400 mcg by mouth daily. No current facility-administered medications for this encounter.      ____________________________________________  ALLERGIES  Allergies   Allergen Reactions    Epinephrine Other (comments)     \"severe tachycardia\"     Mold Extracts Unknown (comments)     Runny nose and congested    Morphine Nausea and Vomiting      ____________________________________________  SOCIAL HISTORY  Social History     Tobacco Use    Smoking status: Never    Smokeless tobacco: Never   Substance Use Topics    Alcohol use:  Yes     Alcohol/week: 1.0 standard drink     Types: 1 Glasses of wine per week     Comment: here and there      ____________________________________________  COVID VACCINATION STATUS:      Internal Administration   First Dose COVID-19, PFIZER PURPLE top, DILUTE for use, (age 15 y+), IM, 30mcg/0.3mL  03/10/2021   Second Dose COVID-19, PFIZER PURPLE top, DILUTE for use, (age 15 y+), IM, 30mcg/0.3mL  04/03/2021      Last COVID Lab No results found for: Fady Miguelck, RCV2CT, CVD2M, COV2, XPLCVT, [id number], [id number], COVV, Revonda Jonathan, [id number] [address] [employer]                   Labs:     Hospital Outpatient Visit on 02/17/2023   Component Date Value Ref Range Status    WBC 02/17/2023 6.6  3.6 - 11.0 K/uL Final    RBC 02/17/2023 3.60 (A)  3.80 - 5.20 M/uL Final    HGB 02/17/2023 11.0 (A)  11.5 - 16.0 g/dL Final    HCT 02/17/2023 33.3 (A)  35.0 - 47.0 % Final    MCV 02/17/2023 92.5  80.0 - 99.0 FL Final    MCH 02/17/2023 30.6  26.0 - 34.0 PG Final    MCHC 02/17/2023 33.0  30.0 - 36.5 g/dL Final    RDW 02/17/2023 13.4  11.5 - 14.5 % Final    PLATELET 49/82/7269 307  150 - 400 K/uL Final    MPV 02/17/2023 9.5  8.9 - 12.9 FL Final    NRBC 02/17/2023 0.0  0  WBC Final    ABSOLUTE NRBC 02/17/2023 0.00  0.00 - 0.01 K/uL Final    Crossmatch Expiration 02/17/2023 02/27/2023,2359   Final    ABO/Rh(D) 02/17/2023 A POSITIVE   Final    Antibody screen 02/17/2023 NEG   Final    INR 02/17/2023 1.1  0.9 - 1.1   Final    A single therapeutic range for Vit K antagonists may not be optimal for all indications - see June, 2008 issue of Chest, American College of Chest Physicians Evidence-Based Clinical Practice Guidelines, 8th Edition. Prothrombin time 02/17/2023 11.0  9.0 - 11.1 sec Final    Color 02/17/2023 YELLOW/STRAW    Final    Color Reference Range: Straw, Yellow or Dark Yellow    Appearance 02/17/2023 CLEAR  CLEAR   Final    Specific gravity 02/17/2023 1.014    Final    pH (UA) 02/17/2023 6.0  5.0 - 8.0   Final    Protein 02/17/2023 Negative  NEG mg/dL Final    Glucose 02/17/2023 Negative  NEG mg/dL Final    Ketone 02/17/2023 Negative  NEG mg/dL Final    Bilirubin 02/17/2023 Negative  NEG   Final    Blood 02/17/2023 Negative  NEG   Final    Urobilinogen 02/17/2023 0.2  0.2 - 1.0 EU/dL Final    Nitrites 02/17/2023 Negative  NEG   Final    Leukocyte Esterase 02/17/2023 Negative  NEG   Final    WBC 02/17/2023 0-4  0 - 4 /hpf Final    RBC 02/17/2023 0-5  0 - 5 /hpf Final    Epithelial cells 02/17/2023 FEW  FEW /lpf Final    Epithelial cell category consists of squamous cells and /or transitional urothelial cells. Renal tubular cells, if present, are separately identified as such. Bacteria 02/17/2023 Negative  NEG /hpf Final    UA:UC IF INDICATED 02/17/2023 CULTURE NOT INDICATED BY UA RESULT  CNI   Final    Other: 02/17/2023 Starch granules present.    Final    Hemoglobin A1c 02/17/2023 5.2  4.0 - 5.6 % Final    Comment: NEW METHOD  PLEASE NOTE NEW REFERENCE RANGE  (NOTE)  HbA1C Interpretive Ranges  <5.7              Normal  5.7 - 6.4         Consider Prediabetes  >6.5              Consider Diabetes      Est. average glucose 02/17/2023 103  mg/dL Final    Special Requests: 02/17/2023 NO SPECIAL REQUESTS    Final    Culture result: 02/17/2023 MRSA NOT PRESENT    Final    Culture result: 02/17/2023     Final                    Value:Screening of patient nares for MRSA is for surveillance purposes and, if positive, to facilitate isolation considerations in high risk settings. It is not intended for automatic decolonization interventions per se as regimens are not sufficiently effective to warrant routine use. Sodium 02/17/2023 139  136 - 145 mmol/L Final    Potassium 02/17/2023 3.4 (A)  3.5 - 5.1 mmol/L Final    Chloride 02/17/2023 106  97 - 108 mmol/L Final    CO2 02/17/2023 27  21 - 32 mmol/L Final    Anion gap 02/17/2023 6  5 - 15 mmol/L Final    Glucose 02/17/2023 96  65 - 100 mg/dL Final    BUN 02/17/2023 13  6 - 20 MG/DL Final    Creatinine 02/17/2023 0.84  0.55 - 1.02 MG/DL Final    BUN/Creatinine ratio 02/17/2023 15  12 - 20   Final    eGFR 02/17/2023 >60  >60 ml/min/1.73m2 Final    Comment:      Pediatric calculator link: [facility] LaboratoriesShow.at. org/professionals/kdoqi/gfr_calculatorped       These results are not intended for use in patients <25years of age. eGFR results are calculated without a race factor using  the 2021 CKD-EPI equation. Careful clinical correlation is recommended, particularly when comparing to results calculated using previous equations. The CKD-EPI equation is less accurate in patients with extremes of muscle mass, extra-renal metabolism of creatinine, excessive creatine ingestion, or following therapy that affects renal tubular secretion. Calcium 02/17/2023 8.7  8.5 - 10.1 MG/DL Final    Bilirubin, total 02/17/2023 0.3  0.2 - 1.0 MG/DL Final    ALT (SGPT) 02/17/2023 19  12 - 78 U/L Final    AST (SGOT) 02/17/2023 20  15 - 37 U/L Final    Alk.  phosphatase 02/17/2023 108  45 - 117 U/L Final    Protein, total 02/17/2023 7.1  6.4 - 8.2 g/dL Final    Albumin 02/17/2023 3.5  3.5 - 5.0 g/dL Final    Globulin 02/17/2023 3.6  2.0 - 4.0 g/dL Final    A-G Ratio 02/17/2023 1.0 (A)  1.1 - 2.2   Final        XR Results (most recent):    Results from Hospital Encounter encounter on 08/10/17    XR CHEST PORT    Narrative  INDICATION:  Chest Pain    COMPARISON: 6/30/2017    FINDINGS: Single AP portable view of the chest obtained at 2321 demonstrates a  stable cardiomediastinal silhouette. The lungs are clear bilaterally. No osseous  abnormalities are seen. There is a left-sided pacer device. There is no  pneumothorax. Impression  IMPRESSION: No evidence of acute cardiopulmonary process. Skin:   Denies open wounds, cuts, sores, rashes or other areas of concern in PAT assessment.         Malissa Edward NP

## 2023-02-23 ENCOUNTER — ANESTHESIA EVENT (OUTPATIENT)
Dept: SURGERY | Age: 83
End: 2023-02-23
Payer: MEDICARE

## 2023-02-23 NOTE — ANESTHESIA PREPROCEDURE EVALUATION
Relevant Problems   CARDIOVASCULAR   (+) HTN (hypertension)   (+) NSTEMI (non-ST elevated myocardial infarction) (HCC)      GASTROINTESTINAL   (+) GERD (gastroesophageal reflux disease)       Anesthetic History     PONV          Review of Systems / Medical History  Patient summary reviewed, nursing notes reviewed and pertinent labs reviewed    Pulmonary        Sleep apnea    Asthma        Neuro/Psych   Within defined limits           Cardiovascular    Hypertension        Dysrhythmias   Pacemaker and CAD    Exercise tolerance: >4 METS  Comments: Takotsubo cardiomyopathy  SSS (sick sinus syndrome)    TTE  Left ventricle: The ventricle was mildly dilated. Ejection fraction was  estimated in the range of 35 % to 40 %. There was akinesis of the mid  anteroseptal, mid-apical inferior, apical septal, and apical wall(s).    GI/Hepatic/Renal     GERD           Endo/Other      Hypothyroidism  Arthritis     Other Findings              Physical Exam    Airway  Mallampati: II  TM Distance: 4 - 6 cm  Neck ROM: normal range of motion   Mouth opening: Normal     Cardiovascular  Regular rate and rhythm,  S1 and S2 normal,  no murmur, click, rub, or gallop  Rhythm: regular  Rate: normal         Dental  No notable dental hx       Pulmonary  Breath sounds clear to auscultation               Abdominal  GI exam deferred       Other Findings            Anesthetic Plan    ASA: 3  Anesthesia type: general    Monitoring Plan: BIS  Post-op pain plan if not by surgeon: peripheral nerve block single    Induction: Intravenous  Anesthetic plan and risks discussed with: Patient

## 2023-02-23 NOTE — H&P
Nick Douglass MD - Adult Reconstruction and Total Joint Replacement     Orthopaedic History and Physical        NAME: Andrew Wood       :  1940       MRN:  [id number]      Subjective:   Patient ID: Andrew Wood is a [de-identified] y.o. female. Chief Complaint: Pain of the Right Knee  The patient presents today for right knee pain and arthritis. She localizes pain medially and posteriorly. She was initially taking Diclofenac as-needed, although she was advised to wean from the medication due to long-term kidney damage. She does not have a history of kidney issues. She occasionally takes Tramadol at night.        Patient Active Problem List    Diagnosis Date Noted    S/P hip replacement 2022    NSTEMI (non-ST elevated myocardial infarction) ([facility] [address] 2017    HTN (hypertension) 2017    History of permanent cardiac pacemaker placement 2017    GERD (gastroesophageal reflux disease) 2017    Pacemaker lead malfunction 2017    Hypomagnesemia 2013    Diverticulitis of colon (without mention of hemorrhage)(562.11) 2013    Hypokalemia 2013     Past Medical History:   Diagnosis Date    Arrhythmia     SVT ablation (x 2) @ [facility] Dr. Meron Chavez, spine, hands    Asthma     as a child    COVID 2022    GERD (gastroesophageal reflux disease)     Hiatal hernia     History of heart attack 2017    anxiety heart attack per pt    Hypertension     Menopause     LMP-    Nausea & vomiting     per Dr. Zo Putnam - no versed, N2O, ro volatile, Dexamethasone , Zofran, and Phergan used    Pacemaker 2017    [employer] Dual chamber PPM    Rheumatic fever     as a child    Sleep apnea     \"mild previously per pt\" (per cardiology office notes)    SSS (sick sinus syndrome) ([facility] [address] 2017    s/p PPM     Takotsubo cardiomyopathy 2017    Thromboembolus ([facility] [address]     in stomach per pt    Thyroid disease     HYPOTHYROID      Past Surgical History:   Procedure Laterality Date    HX APPENDECTOMY      HX CATARACT REMOVAL Bilateral     HX  SECTION        x3, hysterectomy    HX CHOLECYSTECTOMY      HX HEENT      tonsillectomy    HX HYSTERECTOMY  1981    HX ORTHOPAEDIC      right knee orthoscopic, right wrist surgery    HX ORTHOPAEDIC Bilateral     finger surgery for arthritis and swelling    HX PACEMAKER Left 2017    [place] UNLISTED PROCEDURE LUNGS & PLEURA Left 2017    Pacemaker Placed      Prior to Admission medications    Medication Sig Start Date End Date Taking? Authorizing Provider   oxymetazoline (AFRIN) 0.05 % nasal spray 2 Sprays as needed. For nose bleeds    Provider, Historical   hydroCHLOROthiazide (HYDRODIURIL) 25 mg tablet Take 25 mg by mouth as needed. Provider, Historical   traMADoL (ULTRAM) 50 mg tablet Take 50 mg by mouth every six (6) hours as needed for Pain. Provider, Historical   pyridoxine, vitamin B6, (Vitamin B-6) 100 mg tablet Take 100 mg by mouth two (2) times a day. Provider, Historical   carvediloL (COREG) 25 mg tablet Take 25 mg by mouth two (2) times daily (with meals). Provider, Historical   naloxone (NARCAN) 4 mg/actuation nasal spray Use 1 spray intranasally, then discard. Repeat with new spray every 2 min as needed for opioid overdose symptoms, alternating nostrils. 3/3/22   Kimberly Smith, NP   acetaminophen (TYLENOL) 325 mg tablet Take 650 mg by mouth every four (4) hours as needed for Pain. Provider, Historical   enalapril (VASOTEC) 20 mg tablet Take 20 mg by mouth two (2) times a day. Provider, Historical   omeprazole (PRILOSEC) 40 mg capsule Take 40 mg by mouth daily. Provider, Historical   levothyroxine (SYNTHROID) 112 mcg tablet Take  by mouth six (6) days a week. Not on     Provider, Historical   ascorbic acid, vitamin C, (VITAMIN C) 1,000 mg tablet Take 1,000 mg by mouth daily. Provider, Historical   fluticasone (Flonase Sensimist) 27.5 mcg/actuation nasal spray 2 Sprays by Nasal route as needed. Provider, Historical   folic acid 886 mcg tablet Take 400 mcg by mouth daily. Provider, Historical   cyanocobalamin (VITAMIN B12) 2,500 mcg sublingual tablet Take 1,000 mcg by mouth daily. Provider, Historical   cholecalciferol, vitamin D3, 50 mcg (2,000 unit) tab Take  by mouth two (2) times a day. Provider, Historical   potassium 99 mg tablet Take 99 mg by mouth. Takes from time to time per pt    Provider, Historical   LORazepam (ATIVAN) 0.5 mg tablet Take 1 Tab by mouth every six (6) hours as needed. Max Daily Amount: 2 mg. Do not drive or drink alcohol if taking this medicine. 8/13/17   Lisa Simms MD     Allergies   Allergen Reactions    Epinephrine Other (comments)     \"severe tachycardia\"     Mold Extracts Unknown (comments)     Runny nose and congested    Morphine Nausea and Vomiting      Social History     Tobacco Use    Smoking status: Never    Smokeless tobacco: Never   Substance Use Topics    Alcohol use: Yes     Alcohol/week: 1.0 standard drink     Types: 1 Glasses of wine per week     Comment: occasional      Family History   Problem Relation Age of Onset    Diabetes Mother         temporal arteritis, Giant White Cell Disease    Lung Disease Father     Breast Cancer Other         age unknown        REVIEW OF SYSTEMS: A comprehensive review of systems was negative except for that written in the HPI. Objective:   Constitutional: She appears stated age. Pt is cooperative and is in no acute distress. Well nourished. Well developed. Body habitus is normal.   Estimated body mass index is 24.14 kg/m² as calculated from the following:  Height as of this encounter: 5' 2\". Weight as of this encounter: 132 lb. Gait / Assistive devices: Gait was not assessed. No assistive devices. Eyes: Sclera are nonicteric. Respiratory: No labored breathing. Cardiovascular: No marked edema. Well perfused extremities bilaterally. Skin: No marked skin ulcers.  No lymphedema or skin abnormalities. Neurological: No marked sensory loss noted. Grossly neurovascularly intact. Both lower extremities are intact to distal sensory and motor function. Psychiatric: Alert and oriented x3. MUSCULOSKELETAL: Right knee significant medial joint line tenderness. No significant lateral tenderness. Mild varus corrects passively. No instability. Reasonable quad strength. Imaging:   Previous films of the right knee were reviewed and revealed severe medial compartment arthritis with complete loss of joint space and   varus alignment. Assessment:     ICD-10-CM   1. Primary osteoarthritis of right knee M17.11   2. Primary osteoarthritis of left knee M17.12   3. Primary osteoarthritis of right hip M16.11   4. Status post left hip replacement Z96.642     Plan: At this time, we had a long discussion about surgery. We discussed a partial and total knee replacement, as well as the recovery time. Given her films showing primarily medial compartment wear, and her symptoms being localized medially, she may be a good candidate for a partial knee replacement. She will talk with her family before scheduling surgery. All questions were answered to her satisfaction. Follow up as-needed.         KIM Patel

## 2023-02-24 ENCOUNTER — ANESTHESIA (OUTPATIENT)
Dept: SURGERY | Age: 83
End: 2023-02-24
Payer: MEDICARE

## 2023-02-24 ENCOUNTER — HOSPITAL ENCOUNTER (OUTPATIENT)
Age: 83
Setting detail: OBSERVATION
Discharge: HOME | End: 2023-02-25
Attending: ORTHOPAEDIC SURGERY | Admitting: ORTHOPAEDIC SURGERY
Payer: MEDICARE

## 2023-02-24 DIAGNOSIS — Z96.651 STATUS POST RIGHT PARTIAL KNEE REPLACEMENT: Primary | ICD-10-CM

## 2023-02-24 PROBLEM — Z98.890 S/P RIGHT KNEE SURGERY: Status: ACTIVE | Noted: 2023-02-24

## 2023-02-24 PROCEDURE — 77030031139 HC SUT VCRL2 J&J -A: Performed by: ORTHOPAEDIC SURGERY

## 2023-02-24 PROCEDURE — 77030006835 HC BLD SAW SAG STRY -B: Performed by: ORTHOPAEDIC SURGERY

## 2023-02-24 PROCEDURE — 74011250637 HC RX REV CODE- 250/637: Performed by: ORTHOPAEDIC SURGERY

## 2023-02-24 PROCEDURE — 74011250637 HC RX REV CODE- 250/637: Performed by: PHYSICIAN ASSISTANT

## 2023-02-24 PROCEDURE — C1776 JOINT DEVICE (IMPLANTABLE): HCPCS | Performed by: ORTHOPAEDIC SURGERY

## 2023-02-24 PROCEDURE — 77030035236 HC SUT PDS STRATFX BARB J&J -B: Performed by: ORTHOPAEDIC SURGERY

## 2023-02-24 PROCEDURE — 74011000250 HC RX REV CODE- 250: Performed by: NURSE ANESTHETIST, CERTIFIED REGISTERED

## 2023-02-24 PROCEDURE — 2709999900 HC NON-CHARGEABLE SUPPLY: Performed by: ORTHOPAEDIC SURGERY

## 2023-02-24 PROCEDURE — 77030006812 HC BLD SAW RECIP STRY -B: Performed by: ORTHOPAEDIC SURGERY

## 2023-02-24 PROCEDURE — G0378 HOSPITAL OBSERVATION PER HR: HCPCS

## 2023-02-24 PROCEDURE — 77030028907 HC WRP KNEE WO BGS SOLM -B

## 2023-02-24 PROCEDURE — 77030018673: Performed by: ORTHOPAEDIC SURGERY

## 2023-02-24 PROCEDURE — 74011250636 HC RX REV CODE- 250/636: Performed by: ORTHOPAEDIC SURGERY

## 2023-02-24 PROCEDURE — 74011250636 HC RX REV CODE- 250/636: Performed by: NURSE ANESTHETIST, CERTIFIED REGISTERED

## 2023-02-24 PROCEDURE — C1713 ANCHOR/SCREW BN/BN,TIS/BN: HCPCS | Performed by: ORTHOPAEDIC SURGERY

## 2023-02-24 PROCEDURE — 77030010785: Performed by: ORTHOPAEDIC SURGERY

## 2023-02-24 PROCEDURE — 74011250636 HC RX REV CODE- 250/636: Performed by: PHYSICIAN ASSISTANT

## 2023-02-24 PROCEDURE — 74011250636 HC RX REV CODE- 250/636: Performed by: ANESTHESIOLOGY

## 2023-02-24 PROCEDURE — 97530 THERAPEUTIC ACTIVITIES: CPT

## 2023-02-24 PROCEDURE — 2709999900 HC NON-CHARGEABLE SUPPLY

## 2023-02-24 PROCEDURE — 77030013079 HC BLNKT BAIR HGGR 3M -A: Performed by: NURSE ANESTHETIST, CERTIFIED REGISTERED

## 2023-02-24 PROCEDURE — 76010000161 HC OR TIME 1 TO 1.5 HR INTENSV-TIER 1: Performed by: ORTHOPAEDIC SURGERY

## 2023-02-24 PROCEDURE — 76210000003 HC OR PH I REC 3.5 TO 4 HR: Performed by: ORTHOPAEDIC SURGERY

## 2023-02-24 PROCEDURE — 97162 PT EVAL MOD COMPLEX 30 MIN: CPT

## 2023-02-24 PROCEDURE — 74011250636 HC RX REV CODE- 250/636: Performed by: STUDENT IN AN ORGANIZED HEALTH CARE EDUCATION/TRAINING PROGRAM

## 2023-02-24 PROCEDURE — 76060000033 HC ANESTHESIA 1 TO 1.5 HR: Performed by: ORTHOPAEDIC SURGERY

## 2023-02-24 PROCEDURE — 77030010509 HC AIRWY LMA MSK TELE -A: Performed by: NURSE ANESTHETIST, CERTIFIED REGISTERED

## 2023-02-24 PROCEDURE — 74011000250 HC RX REV CODE- 250: Performed by: PHYSICIAN ASSISTANT

## 2023-02-24 PROCEDURE — 77030034479 HC ADH SKN CLSR PRINEO J&J -B: Performed by: ORTHOPAEDIC SURGERY

## 2023-02-24 PROCEDURE — 77030033067 HC SUT PDO STRATFX SPIR J&J -B: Performed by: ORTHOPAEDIC SURGERY

## 2023-02-24 PROCEDURE — 77030000032 HC CUF TRNQT ZIMM -B: Performed by: ORTHOPAEDIC SURGERY

## 2023-02-24 PROCEDURE — 74011000250 HC RX REV CODE- 250: Performed by: ORTHOPAEDIC SURGERY

## 2023-02-24 DEVICE — AFFINIUM 3D COATED TIBIAL TRAY, RIGHT MEDIAL, SIZE 4
Type: IMPLANTABLE DEVICE | Site: KNEE | Status: FUNCTIONAL
Brand: ENGAGE PARTIAL KNEE SYSTEM

## 2023-02-24 DEVICE — CEMENT BNE SIMPLEX W/GENT -- 10/PK: Type: IMPLANTABLE DEVICE | Site: KNEE | Status: FUNCTIONAL

## 2023-02-24 DEVICE — TIBIAL INSERT, SIZE 4, RIGHT MEDIAL, 9MM
Type: IMPLANTABLE DEVICE | Site: KNEE | Status: FUNCTIONAL
Brand: ENGAGE PARTIAL KNEE SYSTEM

## 2023-02-24 DEVICE — FEMORAL COMPONENT, SIZE 4 - RIGHT MEDIAL
Type: IMPLANTABLE DEVICE | Site: KNEE | Status: FUNCTIONAL
Brand: ENGAGE PARTIAL KNEE SYSTEM

## 2023-02-24 DEVICE — IMPL CAPPED KNEE K2 TOTAL/HEMI ADV CEMENTLESS ENGAGE SURGICA: Type: IMPLANTABLE DEVICE | Status: FUNCTIONAL

## 2023-02-24 DEVICE — TIBIAL ANCHOR STEM, SIZE 3-4
Type: IMPLANTABLE DEVICE | Site: KNEE | Status: FUNCTIONAL
Brand: ENGAGE PARTIAL KNEE SYSTEM

## 2023-02-24 RX ORDER — PREGABALIN 75 MG/1
75 CAPSULE ORAL ONCE
Status: COMPLETED | OUTPATIENT
Start: 2023-02-24 | End: 2023-02-24

## 2023-02-24 RX ORDER — ACETAMINOPHEN 325 MG/1
650 TABLET ORAL EVERY 6 HOURS
Status: DISCONTINUED | OUTPATIENT
Start: 2023-02-24 | End: 2023-02-25 | Stop reason: HOSPADM

## 2023-02-24 RX ORDER — SODIUM CHLORIDE, SODIUM LACTATE, POTASSIUM CHLORIDE, CALCIUM CHLORIDE 600; 310; 30; 20 MG/100ML; MG/100ML; MG/100ML; MG/100ML
25 INJECTION, SOLUTION INTRAVENOUS CONTINUOUS
Status: DISCONTINUED | OUTPATIENT
Start: 2023-02-24 | End: 2023-02-24 | Stop reason: HOSPADM

## 2023-02-24 RX ORDER — ROPIVACAINE HYDROCHLORIDE 5 MG/ML
INJECTION, SOLUTION EPIDURAL; INFILTRATION; PERINEURAL AS NEEDED
Status: DISCONTINUED | OUTPATIENT
Start: 2023-02-24 | End: 2023-02-24 | Stop reason: HOSPADM

## 2023-02-24 RX ORDER — ONDANSETRON 4 MG/1
4 TABLET, ORALLY DISINTEGRATING ORAL
Status: DISCONTINUED | OUTPATIENT
Start: 2023-02-26 | End: 2023-02-25 | Stop reason: HOSPADM

## 2023-02-24 RX ORDER — ONDANSETRON 2 MG/ML
4 INJECTION INTRAMUSCULAR; INTRAVENOUS AS NEEDED
Status: DISCONTINUED | OUTPATIENT
Start: 2023-02-24 | End: 2023-02-24 | Stop reason: HOSPADM

## 2023-02-24 RX ORDER — POLYETHYLENE GLYCOL 3350 17 G/17G
17 POWDER, FOR SOLUTION ORAL DAILY
Status: DISCONTINUED | OUTPATIENT
Start: 2023-02-25 | End: 2023-02-25 | Stop reason: HOSPADM

## 2023-02-24 RX ORDER — LIDOCAINE HYDROCHLORIDE 10 MG/ML
0.1 INJECTION, SOLUTION EPIDURAL; INFILTRATION; INTRACAUDAL; PERINEURAL AS NEEDED
Status: DISCONTINUED | OUTPATIENT
Start: 2023-02-24 | End: 2023-02-24 | Stop reason: HOSPADM

## 2023-02-24 RX ORDER — CARVEDILOL 12.5 MG/1
25 TABLET ORAL 2 TIMES DAILY WITH MEALS
Status: DISCONTINUED | OUTPATIENT
Start: 2023-02-24 | End: 2023-02-25 | Stop reason: HOSPADM

## 2023-02-24 RX ORDER — SODIUM CHLORIDE 0.9 % (FLUSH) 0.9 %
5-40 SYRINGE (ML) INJECTION EVERY 8 HOURS
Status: DISCONTINUED | OUTPATIENT
Start: 2023-02-24 | End: 2023-02-25 | Stop reason: HOSPADM

## 2023-02-24 RX ORDER — NALOXONE HYDROCHLORIDE 0.4 MG/ML
0.4 INJECTION, SOLUTION INTRAMUSCULAR; INTRAVENOUS; SUBCUTANEOUS AS NEEDED
Status: DISCONTINUED | OUTPATIENT
Start: 2023-02-24 | End: 2023-02-25 | Stop reason: HOSPADM

## 2023-02-24 RX ORDER — LIDOCAINE HYDROCHLORIDE 20 MG/ML
INJECTION, SOLUTION EPIDURAL; INFILTRATION; INTRACAUDAL; PERINEURAL AS NEEDED
Status: DISCONTINUED | OUTPATIENT
Start: 2023-02-24 | End: 2023-02-24 | Stop reason: HOSPADM

## 2023-02-24 RX ORDER — ASPIRIN 81 MG/1
81 TABLET ORAL 2 TIMES DAILY
Status: DISCONTINUED | OUTPATIENT
Start: 2023-02-24 | End: 2023-02-25 | Stop reason: HOSPADM

## 2023-02-24 RX ORDER — DEXAMETHASONE SODIUM PHOSPHATE 4 MG/ML
INJECTION, SOLUTION INTRA-ARTICULAR; INTRALESIONAL; INTRAMUSCULAR; INTRAVENOUS; SOFT TISSUE AS NEEDED
Status: DISCONTINUED | OUTPATIENT
Start: 2023-02-24 | End: 2023-02-24 | Stop reason: HOSPADM

## 2023-02-24 RX ORDER — MORPHINE SULFATE 2 MG/ML
1 INJECTION, SOLUTION INTRAMUSCULAR; INTRAVENOUS
Status: DISPENSED | OUTPATIENT
Start: 2023-02-24 | End: 2023-02-25

## 2023-02-24 RX ORDER — LEVOTHYROXINE SODIUM 112 UG/1
112 TABLET ORAL
Status: DISCONTINUED | OUTPATIENT
Start: 2023-02-24 | End: 2023-02-25 | Stop reason: HOSPADM

## 2023-02-24 RX ORDER — CELECOXIB 200 MG/1
400 CAPSULE ORAL ONCE
Status: COMPLETED | OUTPATIENT
Start: 2023-02-24 | End: 2023-02-24

## 2023-02-24 RX ORDER — TRAMADOL HYDROCHLORIDE 50 MG/1
50-100 TABLET ORAL
Status: DISCONTINUED | OUTPATIENT
Start: 2023-02-24 | End: 2023-02-25 | Stop reason: HOSPADM

## 2023-02-24 RX ORDER — PROPOFOL 10 MG/ML
INJECTION, EMULSION INTRAVENOUS AS NEEDED
Status: DISCONTINUED | OUTPATIENT
Start: 2023-02-24 | End: 2023-02-24 | Stop reason: HOSPADM

## 2023-02-24 RX ORDER — SODIUM CHLORIDE 9 MG/ML
75 INJECTION, SOLUTION INTRAVENOUS CONTINUOUS
Status: DISPENSED | OUTPATIENT
Start: 2023-02-24 | End: 2023-02-25

## 2023-02-24 RX ORDER — TRANEXAMIC ACID 100 MG/ML
INJECTION, SOLUTION INTRAVENOUS AS NEEDED
Status: DISCONTINUED | OUTPATIENT
Start: 2023-02-24 | End: 2023-02-24 | Stop reason: HOSPADM

## 2023-02-24 RX ORDER — SODIUM CHLORIDE 0.9 % (FLUSH) 0.9 %
5-40 SYRINGE (ML) INJECTION AS NEEDED
Status: DISCONTINUED | OUTPATIENT
Start: 2023-02-24 | End: 2023-02-25 | Stop reason: HOSPADM

## 2023-02-24 RX ORDER — DEXAMETHASONE SODIUM PHOSPHATE 4 MG/ML
10 INJECTION, SOLUTION INTRA-ARTICULAR; INTRALESIONAL; INTRAMUSCULAR; INTRAVENOUS; SOFT TISSUE ONCE
Status: COMPLETED | OUTPATIENT
Start: 2023-02-25 | End: 2023-02-25

## 2023-02-24 RX ORDER — AMOXICILLIN 250 MG
1 CAPSULE ORAL 2 TIMES DAILY
Status: DISCONTINUED | OUTPATIENT
Start: 2023-02-24 | End: 2023-02-25 | Stop reason: HOSPADM

## 2023-02-24 RX ORDER — HYDROCODONE BITARTRATE AND ACETAMINOPHEN 5; 325 MG/1; MG/1
1-2 TABLET ORAL
Qty: 40 TABLET | Refills: 0 | Status: SHIPPED | OUTPATIENT
Start: 2023-02-24 | End: 2023-03-03

## 2023-02-24 RX ORDER — HYDRALAZINE HYDROCHLORIDE 20 MG/ML
INJECTION INTRAMUSCULAR; INTRAVENOUS AS NEEDED
Status: DISCONTINUED | OUTPATIENT
Start: 2023-02-24 | End: 2023-02-24 | Stop reason: HOSPADM

## 2023-02-24 RX ORDER — FENTANYL CITRATE 50 UG/ML
50 INJECTION, SOLUTION INTRAMUSCULAR; INTRAVENOUS AS NEEDED
Status: DISCONTINUED | OUTPATIENT
Start: 2023-02-24 | End: 2023-02-24 | Stop reason: HOSPADM

## 2023-02-24 RX ORDER — FAMOTIDINE 20 MG/1
20 TABLET, FILM COATED ORAL 2 TIMES DAILY
Qty: 40 TABLET | Refills: 0 | Status: SHIPPED | OUTPATIENT
Start: 2023-02-24

## 2023-02-24 RX ORDER — HYDROMORPHONE HYDROCHLORIDE 1 MG/ML
.2-.5 INJECTION, SOLUTION INTRAMUSCULAR; INTRAVENOUS; SUBCUTANEOUS
Status: DISCONTINUED | OUTPATIENT
Start: 2023-02-24 | End: 2023-02-24 | Stop reason: HOSPADM

## 2023-02-24 RX ORDER — MIDAZOLAM HYDROCHLORIDE 1 MG/ML
1 INJECTION, SOLUTION INTRAMUSCULAR; INTRAVENOUS AS NEEDED
Status: DISCONTINUED | OUTPATIENT
Start: 2023-02-24 | End: 2023-02-24 | Stop reason: HOSPADM

## 2023-02-24 RX ORDER — ACETAMINOPHEN 500 MG
1000 TABLET ORAL ONCE
Status: COMPLETED | OUTPATIENT
Start: 2023-02-24 | End: 2023-02-24

## 2023-02-24 RX ORDER — ASPIRIN 81 MG/1
81 TABLET ORAL 2 TIMES DAILY
Qty: 60 TABLET | Refills: 0 | Status: SHIPPED | OUTPATIENT
Start: 2023-02-24

## 2023-02-24 RX ORDER — CELECOXIB 100 MG/1
100 CAPSULE ORAL DAILY
Qty: 30 CAPSULE | Refills: 0 | Status: SHIPPED | OUTPATIENT
Start: 2023-02-24 | End: 2023-03-26

## 2023-02-24 RX ORDER — ONDANSETRON 2 MG/ML
4 INJECTION INTRAMUSCULAR; INTRAVENOUS
Status: DISPENSED | OUTPATIENT
Start: 2023-02-24 | End: 2023-02-25

## 2023-02-24 RX ORDER — FACIAL-BODY WIPES
10 EACH TOPICAL DAILY PRN
Status: DISCONTINUED | OUTPATIENT
Start: 2023-02-26 | End: 2023-02-25 | Stop reason: HOSPADM

## 2023-02-24 RX ORDER — CEFADROXIL 500 MG/1
500 CAPSULE ORAL 2 TIMES DAILY
Qty: 14 CAPSULE | Refills: 0 | Status: SHIPPED | OUTPATIENT
Start: 2023-02-24

## 2023-02-24 RX ORDER — DIPHENHYDRAMINE HCL 12.5MG/5ML
12.5 ELIXIR ORAL
Status: DISCONTINUED | OUTPATIENT
Start: 2023-02-24 | End: 2023-02-25 | Stop reason: HOSPADM

## 2023-02-24 RX ORDER — FENTANYL CITRATE 50 UG/ML
25 INJECTION, SOLUTION INTRAMUSCULAR; INTRAVENOUS
Status: DISCONTINUED | OUTPATIENT
Start: 2023-02-24 | End: 2023-02-24 | Stop reason: HOSPADM

## 2023-02-24 RX ADMIN — ONDANSETRON 4 MG: 2 INJECTION INTRAMUSCULAR; INTRAVENOUS at 16:08

## 2023-02-24 RX ADMIN — FENTANYL CITRATE 25 MCG: 0.05 INJECTION, SOLUTION INTRAMUSCULAR; INTRAVENOUS at 12:16

## 2023-02-24 RX ADMIN — PROPOFOL 50 MCG/KG/MIN: 10 INJECTION, EMULSION INTRAVENOUS at 09:25

## 2023-02-24 RX ADMIN — Medication 3 AMPULE: at 08:15

## 2023-02-24 RX ADMIN — TRAMADOL HYDROCHLORIDE 50 MG: 50 TABLET ORAL at 19:47

## 2023-02-24 RX ADMIN — TRANEXAMIC ACID 1 G: 100 INJECTION, SOLUTION INTRAVENOUS at 09:33

## 2023-02-24 RX ADMIN — SODIUM CHLORIDE, PRESERVATIVE FREE 10 ML: 5 INJECTION INTRAVENOUS at 17:55

## 2023-02-24 RX ADMIN — ACETAMINOPHEN 325MG 650 MG: 325 TABLET ORAL at 17:54

## 2023-02-24 RX ADMIN — FENTANYL CITRATE 100 MCG: 50 INJECTION, SOLUTION INTRAMUSCULAR; INTRAVENOUS at 09:37

## 2023-02-24 RX ADMIN — CELECOXIB 200 MG: 200 CAPSULE ORAL at 08:14

## 2023-02-24 RX ADMIN — FENTANYL CITRATE 25 MCG: 0.05 INJECTION, SOLUTION INTRAMUSCULAR; INTRAVENOUS at 11:13

## 2023-02-24 RX ADMIN — LIDOCAINE HYDROCHLORIDE 50 MG: 20 INJECTION, SOLUTION EPIDURAL; INFILTRATION; INTRACAUDAL; PERINEURAL at 09:18

## 2023-02-24 RX ADMIN — HYDRALAZINE HYDROCHLORIDE 10 MG: 20 INJECTION INTRAMUSCULAR; INTRAVENOUS at 09:42

## 2023-02-24 RX ADMIN — SODIUM CHLORIDE, POTASSIUM CHLORIDE, SODIUM LACTATE AND CALCIUM CHLORIDE: 600; 310; 30; 20 INJECTION, SOLUTION INTRAVENOUS at 09:12

## 2023-02-24 RX ADMIN — CEFAZOLIN 2 G: 1 INJECTION, POWDER, FOR SOLUTION INTRAMUSCULAR; INTRAVENOUS at 17:54

## 2023-02-24 RX ADMIN — PROPOFOL 30 MG: 10 INJECTION, EMULSION INTRAVENOUS at 08:43

## 2023-02-24 RX ADMIN — FENTANYL CITRATE 25 MCG: 0.05 INJECTION, SOLUTION INTRAMUSCULAR; INTRAVENOUS at 13:28

## 2023-02-24 RX ADMIN — PROPOFOL 150 MG: 10 INJECTION, EMULSION INTRAVENOUS at 09:18

## 2023-02-24 RX ADMIN — ASPIRIN 81 MG: 81 TABLET, COATED ORAL at 17:54

## 2023-02-24 RX ADMIN — ROPIVACAINE HYDROCHLORIDE 15 ML: 5 INJECTION, SOLUTION EPIDURAL; INFILTRATION; PERINEURAL at 08:47

## 2023-02-24 RX ADMIN — SODIUM CHLORIDE, PRESERVATIVE FREE 10 ML: 5 INJECTION INTRAVENOUS at 22:00

## 2023-02-24 RX ADMIN — PREGABALIN 75 MG: 75 CAPSULE ORAL at 08:14

## 2023-02-24 RX ADMIN — WATER 2 G: 1 INJECTION INTRAMUSCULAR; INTRAVENOUS; SUBCUTANEOUS at 09:31

## 2023-02-24 RX ADMIN — ACETAMINOPHEN 1000 MG: 500 TABLET ORAL at 08:14

## 2023-02-24 RX ADMIN — DEXAMETHASONE SODIUM PHOSPHATE 4 MG: 4 INJECTION, SOLUTION INTRAMUSCULAR; INTRAVENOUS at 09:35

## 2023-02-24 RX ADMIN — SENNOSIDES AND DOCUSATE SODIUM 1 TABLET: 50; 8.6 TABLET ORAL at 17:54

## 2023-02-24 NOTE — DISCHARGE SUMMARY
Brian Benitez MD - Adult Reconstruction and Total Joint Replacement    Callie Victoria - MRN [id number] - : 1940 ([de-identified] y.o.)    Discharge Summary    Admit date: 2023    Discharge date and time: No discharge date for patient encounter. Admitting Physician: Brian Benitez MD     Date of Surgery: 2023     Preoperative Diagnosis:  RIGHT KNEE OSTEOARTHRITIS    Postoperative Diagnosis: RIGHT KNEE OSTEOARTHRITIS    Procedure(s): Procedure(s):  RIGHT UNICONDYLAR KNEE ARTHROPLASTY    Surgeon: Surgeon(s) and Role:     Fani Warner MD - Primary      Anesthesia:  General    HPI:  Pt is a [de-identified] y.o. female who has a history of RIGHT KNEE OSTEOARTHRITIS  with pain and limitations of activities of daily living who presents at this time for total joint replacement following the failure of conservative management. PMH:   Past Medical History:   Diagnosis Date    Arrhythmia     SVT ablation (x 2) [place] [facility] Abhijit Talbert, spine, hands    Asthma     as a child    COVID 2022    GERD (gastroesophageal reflux disease)     Hiatal hernia     History of heart attack     anxiety heart attack per pt    Hypertension     Menopause     LMP-    Nausea & vomiting     per Dr. Du Ricardo - no versed, N2O, ro volatile, Dexamethasone , Zofran, and Phergan used    Pacemaker 2017    [employer] Dual chamber PPM    Rheumatic fever     as a child    Sleep apnea     \"mild previously per pt\" (per cardiology office notes)    SSS (sick sinus syndrome) ([facility] [address] 2017    s/p PPM     Takotsubo cardiomyopathy 2017    Thromboembolus ([facility] [address]     in stomach per pt    Thyroid disease     HYPOTHYROID       Medications upon admission :   Prior to Admission Medications   Prescriptions Last Dose Informant Patient Reported? Taking? LORazepam (ATIVAN) 0.5 mg tablet 2023  No No   Sig: Take 1 Tab by mouth every six (6) hours as needed. Max Daily Amount: 2 mg. Do not drive or drink alcohol if taking this medicine. acetaminophen (TYLENOL) 325 mg tablet 2023  Yes Yes   Sig: Take 650 mg by mouth every four (4) hours as needed for Pain. ascorbic acid, vitamin C, (VITAMIN C) 1,000 mg tablet 2023  Yes No   Sig: Take 1,000 mg by mouth daily. carvediloL (COREG) 25 mg tablet 2023 at 0445  Yes Yes   Sig: Take 25 mg by mouth two (2) times daily (with meals). cholecalciferol, vitamin D3, 50 mcg (2,000 unit) tab 2023  Yes No   Sig: Take  by mouth two (2) times a day. cyanocobalamin (VITAMIN B12) 2,500 mcg sublingual tablet 2023  Yes No   Sig: Take 1,000 mcg by mouth daily. enalapril (VASOTEC) 20 mg tablet 2023 at 0445  Yes Yes   Sig: Take 20 mg by mouth two (2) times a day. fluticasone (Flonase Sensimist) 27.5 mcg/actuation nasal spray Unknown  Yes No   Si Sprays by Nasal route as needed. folic acid 108 mcg tablet 2023  Yes No   Sig: Take 400 mcg by mouth daily. hydroCHLOROthiazide (HYDRODIURIL) 25 mg tablet 2023  Yes No   Sig: Take 25 mg by mouth as needed. levothyroxine (SYNTHROID) 112 mcg tablet 2023 at 0445  Yes Yes   Sig: Take  by mouth six (6) days a week. Not on    naloxone [facility]) 4 mg/actuation nasal spray   No No   Sig: Use 1 spray intranasally, then discard. Repeat with new spray every 2 min as needed for opioid overdose symptoms, alternating nostrils. omeprazole (PRILOSEC) 40 mg capsule 2023 at 0445  Yes Yes   Sig: Take 40 mg by mouth daily. oxymetazoline (AFRIN) 0.05 % nasal spray 2/10/2023  Yes No   Si Sprays as needed. For nose bleeds   potassium 99 mg tablet   Yes No   Sig: Take 99 mg by mouth. Takes from time to time per pt   pyridoxine, vitamin B6, (VITAMIN B-6) 100 mg tablet 2023  Yes No   Sig: Take 100 mg by mouth two (2) times a day. traMADoL (ULTRAM) 50 mg tablet 2023  Yes No   Sig: Take 50 mg by mouth every six (6) hours as needed for Pain. Facility-Administered Medications: None        Allergies:     Allergies Allergen Reactions    Epinephrine Other (comments)     \"severe tachycardia\"     Mold Extracts Unknown (comments)     Runny nose and congested    Morphine Nausea and Vomiting        Hospital Course: The patient underwent surgery. There were no immediate post-operative complications. The patient was taken to the PACU in stable condition. Hemoglobin at discharge:    Lab Results   Component Value Date/Time    HGB 11.0 (L) 02/17/2023 01:00 PM    INR 1.1 02/17/2023 01:00 PM       Dressing was changed postoperatively and the incision was noted to be clean, dry and intact. Normal significant erythema and swelling was noted. Neurovascular exam within normal limits. Wound appears to be healing without any evidence of infection. Physical Therapy started postoperatively per protocol. The patient was allowed to bear weight as tolerated. Discharge instructions:  -Anticoagulate per discharge instructions  -Resume pre hospital diet              Current Discharge Medication List        START taking these medications    Details   cefadroxil (DURICEF) 500 mg capsule Take 1 Capsule by mouth two (2) times a day. Qty: 14 Capsule, Refills: 0  Start date: 2/24/2023      HYDROcodone-acetaminophen (NORCO) 5-325 mg per tablet Take 1-2 Tablets by mouth every four (4) hours as needed for Pain for up to 7 days. Max Daily Amount: 12 Tablets. Qty: 40 Tablet, Refills: 0  Start date: 2/24/2023, End date: 3/3/2023    Associated Diagnoses: Status post right partial knee replacement      aspirin delayed-release 81 mg tablet Take 1 Tablet by mouth two (2) times a day. Qty: 60 Tablet, Refills: 0  Start date: 2/24/2023      famotidine (PEPCID) 20 mg tablet Take 1 Tablet by mouth two (2) times a day. Qty: 40 Tablet, Refills: 0  Start date: 2/24/2023      celecoxib (CELEBREX) 100 mg capsule Take 1 Capsule by mouth daily for 30 days.   Qty: 30 Capsule, Refills: 0  Start date: 2/24/2023, End date: 3/26/2023           CONTINUE these medications which have NOT CHANGED    Details   carvediloL (COREG) 25 mg tablet Take 25 mg by mouth two (2) times daily (with meals). acetaminophen (TYLENOL) 325 mg tablet Take 650 mg by mouth every four (4) hours as needed for Pain.      enalapril (VASOTEC) 20 mg tablet Take 20 mg by mouth two (2) times a day. omeprazole (PRILOSEC) 40 mg capsule Take 40 mg by mouth daily. levothyroxine (SYNTHROID) 112 mcg tablet Take  by mouth six (6) days a week. Not on Sunday      oxymetazoline (AFRIN) 0.05 % nasal spray 2 Sprays as needed. For nose bleeds      hydroCHLOROthiazide (HYDRODIURIL) 25 mg tablet Take 25 mg by mouth as needed. traMADoL (ULTRAM) 50 mg tablet Take 50 mg by mouth every six (6) hours as needed for Pain.      pyridoxine, vitamin B6, (VITAMIN B-6) 100 mg tablet Take 100 mg by mouth two (2) times a day.      naloxone (NARCAN) 4 mg/actuation nasal spray Use 1 spray intranasally, then discard. Repeat with new spray every 2 min as needed for opioid overdose symptoms, alternating nostrils. Qty: 1 Each, Refills: 0      ascorbic acid, vitamin C, (VITAMIN C) 1,000 mg tablet Take 1,000 mg by mouth daily. fluticasone (Flonase Sensimist) 27.5 mcg/actuation nasal spray 2 Sprays by Nasal route as needed. folic acid 895 mcg tablet Take 400 mcg by mouth daily. cyanocobalamin (VITAMIN B12) 2,500 mcg sublingual tablet Take 1,000 mcg by mouth daily. cholecalciferol, vitamin D3, 50 mcg (2,000 unit) tab Take  by mouth two (2) times a day. potassium 99 mg tablet Take 99 mg by mouth. Takes from time to time per pt      LORazepam (ATIVAN) 0.5 mg tablet Take 1 Tab by mouth every six (6) hours as needed. Max Daily Amount: 2 mg. Do not drive or drink alcohol if taking this medicine. Qty: 20 Tab, Refills: 0              -Discharge activity: as tolerated +/- assistive devices as prescribed.   -Routine Wound Care  -Follow up in office in 2-3 weeks      Signed:  Melissa Link PA  2/24/2023  11:33 AM

## 2023-02-24 NOTE — PROGRESS NOTES
Problem: Pain - Acute  Goal: *Control of acute pain  Outcome: Progressing Towards Goal     Problem: Patient Education: Go to Patient Education Activity  Goal: Patient/Family Education  Outcome: Progressing Towards Goal

## 2023-02-24 NOTE — PROGRESS NOTES
Problem: Mobility Impaired (Adult and Pediatric)  Goal: *Acute Goals and Plan of Care (Insert Text)  Description: FUNCTIONAL STATUS PRIOR TO ADMISSION: Patient was independent and active without use of DME.    HOME SUPPORT PRIOR TO ADMISSION: The patient lived with . Physical Therapy Goals  Initiated 2/24/2023  1. Patient will move from supine to sit and sit to supine  in bed with independence within 7 day(s). 2.  Patient will transfer from bed to chair and chair to bed with modified independence using the least restrictive device within 7 day(s). 3.  Patient will perform sit to stand with modified independence within 7 day(s). 4.  Patient will ambulate with modified independence for 100 feet with the least restrictive device within 7 day(s). 5.  Patient will ascend/descend 12 stairs with 1 handrail(s) with minimal assistance/contact guard assist within 7 day(s). Outcome: Not Met     PHYSICAL THERAPY EVALUATION  Patient: Madonna Young (88 y.o. female)  Date: 2/24/2023  Primary Diagnosis: S/P right knee surgery [Z98.890]  Procedure(s) (LRB):  RIGHT UNICONDYLAR KNEE ARTHROPLASTY (Right) Day of Surgery   Precautions: WBAT, fall       ASSESSMENT  Based on the objective data described below, the patient presents with generalized weakness, decreased activity tolerance, impaired balance, altered gait, increased nausea and significant dizziness. Pt was received in supine and cleared by nursing to mobilize. Vitals stable, but pt felt terrible. She could barely tolerate coming to the EOB and ambulating around the bed due to dizziness. Able to void in [facility]. Returned to supine at the end of the session. Not safe to discharge home. Ice and compression applied. Pt no comfortable with the idea of going home with the way she feels. Current Level of Function Impacting Discharge (mobility/balance): CGA    Functional Outcome Measure:   The patient scored 16/24 on the [facility] outcome measure which is indicative of decreased functional mobility. Other factors to consider for discharge:      Patient will benefit from skilled therapy intervention to address the above noted impairments. PLAN :  Recommendations and Planned Interventions: bed mobility training, transfer training, gait training, therapeutic exercises, patient and family training/education, and therapeutic activities      Frequency/Duration: Patient will be followed by physical therapy:  twice daily to address goals. Recommendation for discharge: (in order for the patient to meet his/her long term goals)  Physical therapy at least 2 days/week in the home AND ensure assist and/or supervision for safety with mobility    This discharge recommendation:  Has been made in collaboration with the attending provider and/or case management    IF patient discharges home will need the following DME: rolling walker         SUBJECTIVE:   Patient stated I feel terrible, I can't go home feeling like this.     OBJECTIVE DATA SUMMARY:   HISTORY:    Past Medical History:   Diagnosis Date    Arrhythmia     SVT ablation (x 2) @ [facility] Dr. Dacosta Asp, spine, hands    Asthma     as a child    COVID 2022    GERD (gastroesophageal reflux disease)     Hiatal hernia     History of heart attack     anxiety heart attack per pt    Hypertension     Menopause     LMP-    Nausea & vomiting     per Dr. Clarissa Yap - no versed, N2O, ro volatile, Dexamethasone , Zofran, and Phergan used    Pacemaker 2017    [employer] Dual chamber PPM    Rheumatic fever     as a child    Sleep apnea     \"mild previously per pt\" (per cardiology office notes)    SSS (sick sinus syndrome) ([address] 2017    s/p PPM     Takotsubo cardiomyopathy 2017    Thromboembolus ([address]     in stomach per pt    Thyroid disease     HYPOTHYROID     Past Surgical History:   Procedure Laterality Date    HX APPENDECTOMY      HX CATARACT REMOVAL Bilateral     HX  SECTION       x3, hysterectomy    HX CHOLECYSTECTOMY      HX HEENT      tonsillectomy    HX HYSTERECTOMY  1981    HX ORTHOPAEDIC      right knee orthoscopic, right wrist surgery    HX ORTHOPAEDIC Bilateral     finger surgery for arthritis and swelling    HX PACEMAKER Left 06/03/2017    [place] UNLISTED PROCEDURE LUNGS & PLEURA Left 2017    Pacemaker Placed       Personal factors and/or comorbidities impacting plan of care:     Home Situation  Home Environment: Private residence  Tub or Shower Type: Tub/Shower combination    EXAMINATION/PRESENTATION/DECISION MAKING:   Critical Behavior:  Neurologic State: Drowsy  Orientation Level: Oriented X4  Cognition: Follows commands     Hearing: Auditory  Auditory Impairment: None  Hearing Aids/Status: Does not own  Skin:  dressing intact  Edema: WDL  Range Of Motion:  AROM: Generally decreased, functional           PROM: Generally decreased, functional           Strength:    Strength: Generally decreased, functional           Functional Mobility:  Bed Mobility:  Rolling: Contact guard assistance  Supine to Sit: Contact guard assistance  Sit to Supine: Contact guard assistance  Scooting: Contact guard assistance  Transfers:  Sit to Stand: Contact guard assistance  Stand to Sit: Contact guard assistance                       Balance:   Sitting: Intact  Standing: Impaired  Standing - Static: Fair;Constant support  Standing - Dynamic : Fair;Constant support  Ambulation/Gait Training:  Distance (ft): 15 Feet (ft)  Assistive Device: Gait belt;Walker, rolling  Ambulation - Level of Assistance: Contact guard assistance        Gait Abnormalities: Decreased step clearance;Shuffling gait        Base of Support: Widened     Speed/Lizzy: Pace decreased (<100 feet/min); Shuffled  Step Length: Left shortened;Right shortened                Functional Measure:  [facility] AM-PAC®      Basic Mobility Inpatient Short Form (6-Clicks) Version 2  How much HELP from another person do you currently need. .. (If the patient hasn't done an activity recently, how much help from another person do you think they would need if they tried?) Total A Lot A Little None   1. Turning from your back to your side while in a flat bed without using bedrails? []  1 []  2 [x]  3  []  4   2. Moving from lying on your back to sitting on the side of a flat bed without using bedrails? []  1 []  2 [x]  3  []  4   3. Moving to and from a bed to a chair (including a wheelchair)? []  1 []  2 [x]  3  []  4   4. Standing up from a chair using your arms (e.g. wheelchair or bedside chair)? []  1 []  2 [x]  3  []  4   5. Walking in hospital room? []  1 [x]  2 []  3  []  4   6. Climbing 3-5 steps with a railing? []  1 [x]  2 []  3  []  4     Raw Score: 16/24                            Cutoff score ?171,2,3 had higher odds of discharging home with home health or need of SNF/IPR. [address] Melter Denyse Leavell [facility] Every. Validity of the AM-PAC 6-Clicks Inpatient Daily Activity and Basic Mobility Short Forms. Physical Therapy Mar 2014, [phone]) [phone]; DOI: 10.2522/ptj.72538349  2. [place] Association of AM-PAC \"6-Clicks\" Basic Mobility and Daily Activity Scores With Discharge Destination. Phys Ther. 2021 Apr 4;101(4):rwdy373. doi: 10.1093/ptj/wbuw833. PMID: 23126656. V Reba Hicks, Aryan D, Debra Susy, Leah K, Susana S. Activity Measure for Post-Acute Care \"6-Clicks\" Basic Mobility Scores Predict Discharge Destination After Acute Care Hospitalization in Select Patient Groups: A Retrospective, Observational Study. Arch Rehabil Res Clin Transl. 2022 Jul 16;4(3):789979. doi: 10.1016/j.arrct. 0779.811893. PMID: 12098011; PMCID: [id number]. 4. Ashlyn Mcdaniels Ni P. AM-PAC Short Forms Manual 4.0. Revised 2/2020.        Physical Therapy Evaluation Charge Determination   History Examination Presentation Decision-Making   HIGH Complexity :3+ comorbidities / personal factors will impact the outcome/ POC  MEDIUM Complexity : 3 Standardized tests and measures addressing body structure, function, activity limitation and / or participation in recreation  MEDIUM Complexity : Evolving with changing characteristics  Other outcome measures [facility]  MEDIUM      Based on the above components, the patient evaluation is determined to be of the following complexity level: MEDIUM    Pain Ratin/10    Activity Tolerance:   Fair    After treatment patient left in no apparent distress:   Supine in bed and Call bell within reach    COMMUNICATION/EDUCATION:   The patients plan of care was discussed with: Registered nurse and NP . Fall prevention education was provided and the patient/caregiver indicated understanding. and Patient/family agree to work toward stated goals and plan of care.     Thank you for this referral.  Cynthia Quarles, PT, DPT   Time Calculation: 25 mins

## 2023-02-24 NOTE — DISCHARGE INSTRUCTIONS
Discharge Instructions: How to Manage Your Health      Surgery: Partial Knee Replacement  Surgeon:   Enrique Mccormick MD  Surgery Date:  1/28/2021    To relieve pain:  Use ice/gel packs. Put the ice pack directly over the wound, or anywhere you are hurting or swollen. To control pain and swelling, keep ice on regularly, especially after physical activity. The packs should stay cold for 3-4 hours. When it is not cold anymore, rotate with the packs in the freezer. Elevate your leg. This will also keep swelling down. Rest for at least 20 minutes between activity or exercises. To keep track of your pain medications, write down what you take and when you take it. The last dose of pain medication you got in the hospital was:       Medication    Dose    Date & Time          Choose your medications based on the pain scale below: To keep your pain under control, take Tylenol every 6 hours for 14 days - even if you feel like you dont need it. For mild to moderate pain (4-6 on pain scale), take one pain pill every 3-4 hours as needed. For severe pain (7-10 on pain scale), take two pain pills every 3-4 hours as needed. To prevent nausea, take your pain medications with food. Pain Scale                As your pain lessens:    Slowly start taking less pain medication. You may do this by waiting longer between doses or by taking smaller doses. Stop using the pain medications as soon as you no longer need it, usually in 2-3 weeks. Aspirin  To prevent blood clots, you will need to take Aspirin 81 mg twice a day for 30 days. To prevent stomach upset or bleeding: Take Pepcid 20 mg twice a day, or a similar home medication, while you are taking a blood thinner.    You may take non-steroidal anti-inflammatory medications (Ibuprofen, Advil, Motrin, Naproxen, etc.) - we prescribed a course of either Ketorolac or Celecoxib for pain and swelling. OPSITE     You are discharged with an ACE wrap around your leg for swelling and comfort that can remain in place for 24-48 hours. There is a foam bandage on your knee that can be left alone until follow up in our office. If it no longer seals properly and is not water tight, or if moisture gets into it, it should be gently taken down. Try to leave the glue mesh tape that is directly underneath it covering your incision in place. Those instructions are below. .. You will have some swelling, warmth, and bruising around the knee and up and down the leg after surgery. This will may get worse in the first few days you are home and will slowly get better over the next few weeks. There is a glue mesh tape over your incision that is to remain in place. [address] is a type of skin glue and mesh that is keeping your wound together. Leave this covering alone. Do not peel it off. Do not apply oils or lotions over it. You may shower with this dressing but do not soak it. Gently pat your wound dry when you get out of the shower. DO NOTs:  Do not rub your surgical wound  Do not put lotion or oils on your wound. Do not take a tub bath or go swimming until your doctor says it is ok. You may shower with this dressing over your wound. After showering pat the dressing dry. If you have staples a home health nurse will remove them in about 10 days. To increase and promote healing:  Stop Smoking (or at least cut back on       Smoking). Eat a well-balanced diet (high in protein       and vitamin C). If you have a poor appetite, drink Ensure, Glucerna, or         [place] Instant Breakfast for the next 30 days.     If you are diabetic, you should control your blood         sugars to prevent infection and help your wound         to heal.                    f you have a poor appetite, drink Ensure, Glucerna, or [place] Instant Breakfast for the next 30 days. If you are diabetic, you should control your blood                                                sugars to prevent infection and help your wound                                                to heal.     Prevent Infection    Wash your hands. This is the most important thing you or your caregiver can do. Wash your hands with soap and water (or use the hand  we gave you) before you touch any wounds. 2. Shower. Use the antibacterial soap we gave you when you take a shower. Shower with this soap until your wounds are healed. 3.  Use clean sheets. Put freshly cleaned sheets on your bed after surgery. To keep the surgery site clean, do not allow pets to sleep with you while your wound is still healing. To prevent constipation, stay active and drink plenty of fluid. While using pain medications, you should also take stool softeners and laxatives, such as Pericolace       and Miralax. If you are having too many bowel       Movements, then you may need       to stop taking the laxatives. You should have a bowel movement 3-4 days        after surgery and then at least every other day while       on pain medication. To improve your recovery, you must be active! Use your walker and take short walks (in your home)         about every 2 hours during the day. Try to increase how far you walk each day. You can put as much weight on your leg as you can tolerate while walking. To avoid getting a stiff knee, work on getting your knee bent and straight as soon as possible. Start physical therapy right away. If this is not already set up contact our office ASAP for further instructions. NO DRIVING until your surgeon tells you it is ok. You can return to work when cleared by a physician.       Please call your physician immediately if you have:    Constant bleeding from your wound. Increasing redness or swelling around your wound (Some warmth, bruising and swelling is normal). Change in wound drainage (increase in amount, color, or bad smell). Change in mental status (unusual behavior  Temperature over 101.5 degrees Fahrenheit     Pain or redness in the calf (back of your lower leg - see picture)    Increased swelling of the thigh, ankle, calf, or foot. Emergency: CALL 911 if you have:    Shortness of breath    Chest pain when you cough or taking a deep breath    Please call your surgeons office at [phone] for a follow up appointment.   _  You should call as soon as you get home or the next day after discharge. Ask to make an appointment in 2 weeks. If you have questions or concerns during normal business hours, you may reach Dr. Loy Mauro' Team at [phone].

## 2023-02-24 NOTE — H&P
Date of Surgery Update:  Dean Whitaker was seen and examined on the day of surgery prior to the procedure. There were no significant clinical changes since the completion of the History and Physical.    Exam today prior to surgery showed no acute cardiac findings, no respiratory difficulty, and no abdominal complaints or pain. This patient is a candidate for TXA. The patient was counseled at length about the risks of john Covid-19 during their perioperative period and any recovery window from their procedure. The patient was made aware that john Covid-19  may worsen their prognosis for recovering from their procedure and lend to a higher morbidity and/or mortality risk. All material risks, benefits, and reasonable alternatives including postponing the procedure were discussed. The patient does wish to proceed with the procedure at this time. Documentation of Medical Necessity:    Symptoms: pain with activity and at rest, antalgia, interferes with ADLs    Conservative Treatment: activity modification, multiple medications, injection    Physical Findings: varus, pain at joint line, antalgia on ambulation, crepitation    See PCP/Cardiology/PAT/Anesthesia record for cardiopulmonary evaluation. Imaging: significant OA, sclerosis and osteophytes, varus    Indications:   Failure of conservative treatments with daily pain and functional limitations. Appropriate imaging demonstrating significant disease. Appropriate physical findings consistent with significant degenerative joint disease. All pertinent risks, benefits, and alternatives to operative management including continued conservative care were explained at length. The patient has elected to proceed with appropriately indicated and medically necessary total joint arthroplasty. They understand no guarantees can be given about the outcome.       Signed By: KIM Alanis     February 24, 2023 7:45 AM

## 2023-02-24 NOTE — PROGRESS NOTES
End of Shift Note    Bedside shift change report given to Evan (oncoming nurse) by Monica Guillaume RN (offgoing nurse). Report included the following information SBAR    Shift worked:  7-3   Shift summary and any significant changes:     S/P rt knee today Will need 3 more q one hour VS. MEd for Nausea.  Voided x 3 on bedpan       Concerns for physician to address:     Zone phone for oncoming shift:        Patient Information  Macy Chahal  [de-identified] y.o.  2/24/2023  7:18 AM by Sohail Rushing MD. Macy Chahal was admitted from Home    Problem List  Patient Active Problem List    Diagnosis Date Noted    S/P right knee surgery 02/24/2023    S/P hip replacement 03/03/2022    NSTEMI (non-ST elevated myocardial infarction) ([facility] [address] 08/11/2017    HTN (hypertension) 08/11/2017    History of permanent cardiac pacemaker placement 08/11/2017    GERD (gastroesophageal reflux disease) 08/11/2017    Pacemaker lead malfunction 06/30/2017    Hypomagnesemia 04/24/2013    Diverticulitis of colon (without mention of hemorrhage)(562.11) 04/21/2013    Hypokalemia 04/21/2013     Past Medical History:   Diagnosis Date    Arrhythmia 1996    SVT ablation (x 2) @ [facility] Dr. Conklin Fell, spine, hands    Asthma     as a child    COVID 01/2022    GERD (gastroesophageal reflux disease)     Hiatal hernia     History of heart attack 2017    anxiety heart attack per pt    Hypertension     Menopause     LMP-1981    Nausea & vomiting     per Dr. Calvin Soriano - no versed, N2O, ro volatile, Dexamethasone , Zofran, and Phergan used    Pacemaker 06/2017    [employer] Dual chamber PPM    Rheumatic fever     as a child    Sleep apnea     \"mild previously per pt\" (per cardiology office notes)    SSS (sick sinus syndrome) ([facility] [address] 06/2017    s/p PPM     Takotsubo cardiomyopathy 08/2017    Thromboembolus ([address]     in stomach per pt    Thyroid disease     HYPOTHYROID       Core Measures:  CNot applicable    Activity:     Number times ambulated in hallways past shift: 0  Number of times OOB to chair past shift: 0    Cardiac:   Cardiac Monitoring: No      Cardiac Rhythm: Sinus Rhythm    Access:   Current line(s): PIV     Genitourinary:   Urinary status: voiding    Respiratory:   O2 Device: None (Room air)  Chronic home O2 use?: NO  Incentive spirometer at bedside: N/A       GI:  Last Bowel Movement Date: 02/23/23  Current diet:  ADULT DIET Regular  Passing flatus: YES  Tolerating current diet: YES       Pain Management:   Patient states pain is manageable on current regimen: YES    Skin:  Williams Score: 19  Interventions: float heels    Patient Safety:  Fall Score:    Interventions: bed/chair alarm       DVT prophylaxis:  DVT prophylaxis Med- No  DVT prophylaxis SCD or BOB- Yes     Wounds: (If Applicable)  Wounds- Yes  Location RT knee dressing dry with ace andice    Active Consults:  None    Length of Stay:  Expected LOS: - - -  Actual LOS: 0  Discharge Plan: Yes possibly later today after seen by juli Lucero RN

## 2023-02-24 NOTE — PROGRESS NOTES
Ortho / Neurosurgery NP Note    POD# 0  s/p RIGHT UNICONDYLAR KNEE ARTHROPLASTY   Pt seen with butch at bedside    Pt resting in bed. No complaints except for nausea. D/w nurse who is medicating her with Zofran now. VSS Afebrile. Voiding status: + void  Output (mL)  Urine Voided: 450 ml (02/24/23 1400)  Last Bowel Movement Date: 02/23/23 (02/24/23 1695)      Labs  Lab Results   Component Value Date/Time    HGB 11.0 (L) 02/17/2023 01:00 PM      Lab Results   Component Value Date/Time    INR 1.1 02/17/2023 01:00 PM        Recent Glucose Results: No results found for: GLU, GLUPOC, GLUCPOC      Body mass index is 24.72 kg/m². : A BMI > 30 is classified as obesity and > 40 is classified as morbid obesity. Dressing c.d.i  Cryotherapy in place over incision  Calves soft and supple; No pain with passive stretch  Sensation and motor intact  SCDs for mechanical DVT proph while in bed     PLAN:  1) PT BID  2) Aspirin 81 mg PO BID for DVT Prophylaxis   3) GI Prophylaxis - Pepcid  4) Readniess for discharge:     [x] Vital Signs stable    [x] Hgb stable    [x] + Voiding    [x] Wound intact, drainage minimal    [x] Tolerating PO intake     [] Cleared by PT (OT if applicable)     [] Stair training completed (if applicable)    [] Independent / Contact Guard Assist (household distance)     [] Bed mobility     [] Car transfers     [] ADLs    [x] Adequate pain control on oral medication alone     Plan home with family when nausea improves and clears PT.     Alejandro Carrillo NP  DNP, ACNP-BC, ONP-C

## 2023-02-24 NOTE — PROGRESS NOTES
Received patient from PACU. Alert and oriented x 4 ,dressing dry and intact. Moving feet. Good cap refill. Oriented to room Denies pain nausea or SOB at present

## 2023-02-24 NOTE — ANESTHESIA POSTPROCEDURE EVALUATION
Procedure(s):  RIGHT UNICONDYLAR KNEE ARTHROPLASTY.     general    Anesthesia Post Evaluation      Multimodal analgesia: multimodal analgesia used between 6 hours prior to anesthesia start to PACU discharge  Patient location during evaluation: bedside  Patient participation: complete - patient participated  Level of consciousness: awake  Pain management: adequate  Airway patency: patent  Anesthetic complications: no  Cardiovascular status: acceptable  Respiratory status: acceptable  Hydration status: acceptable  Post anesthesia nausea and vomiting:  controlled  Final Post Anesthesia Temperature Assessment:  Normothermia (36.0-37.5 degrees C)      INITIAL Post-op Vital signs:   Vitals Value Taken Time   /50 02/24/23 1215   Temp 36.2 °C (97.1 °F) 02/24/23 1145   Pulse 74 02/24/23 1215   Resp 11 02/24/23 1215   SpO2 96 % 02/24/23 1215

## 2023-02-24 NOTE — BRIEF OP NOTE
Brief Postoperative Note    Patient: Tc Hamm  YOB: 1940  MRN: [id number]    Date of Procedure: 2/24/2023     Pre-Op Diagnosis: RIGHT KNEE OSTEOARTHRITIS    Post-Op Diagnosis: Same as preoperative diagnosis. Procedure(s):  RIGHT UNICONDYLAR KNEE ARTHROPLASTY    Surgeon(s):  Nona Orlando MD    Surgical Assistant: Physician Assistant: KIM Galloway  Surg Asst-1: Pema Reyes    Anesthesia: General     Estimated Blood Loss (mL): Minimal    Complications: None    Specimens: * No specimens in log *     Implants:   Implant Name Type Inv.  Item Serial No.  Lot No. LRB No. Used Action   CEMENT BNE SIMPLEX W/GENT -- 10/PK - SNA  CEMENT BNE SIMPLEX W/GENT -- 10/PK NA [employer] HOW[employer]WD [id number] Right 1 Implanted   COMPONENT FEM 4 RT MEDL - SNA  COMPONENT FEM 4 RT MEDL NA [employer] [id number] Right 1 Implanted   STEM TIB ANCHR 3-4 - SNA  STEM TIB ANCHR 3-4 NA [employer] [id number] Right 1 Implanted   COMPONENT TIB TY 4 RT MEDL AFFINIUM 3 DIM COAT - SNA  COMPONENT TIB TY 4 RT MEDL AFFINIUM 3 DIM COAT NA [employer] [id number] Right 1 Implanted   INSERT TIB 4 9 MM RT MEDL - SNA  INSERT TIB 4 9 MM RT MEDL NA [employer] [id number] Right 1 Implanted       Drains: * No LDAs found *    Findings: n/a    Electronically Signed by KIM Cornell on 2/24/2023 at 11:29 AM

## 2023-02-24 NOTE — PERIOP NOTES
[address] from Operating Room to PACU    Report received from Micah Gusman and ABBE Coelho CRNA regarding Nolvia Chavez. Surgeon(s):  Leo Noel MD  And Procedure(s) (LRB):  RIGHT UNICONDYLAR KNEE ARTHROPLASTY (Right)  confirmed   with allergies and dressings discussed. Anesthesia type, drugs, patient history, complications, estimated blood loss, vital signs, intake and output, and last pain medication, lines, and temperature were reviewed. 1150 Patient expresses concerns regarding being discharged home today. Left message with OR nurse for DR Peggy Diaz or Cecilia ALVAREZ to come and talk with patient. 46 Patient's  updated via phone. 5187 TRANSFER - OUT REPORT:    Verbal report given to [facility] - VALORIE RN(name) on Nolvia Chavez  being transferred to Ortho(unit) for routine post - op       Report consisted of patients Situation, Background, Assessment and   Recommendations(SBAR). Information from the following report(s) SBAR, Kardex, OR Summary, Procedure Summary, Intake/Output, and MAR was reviewed with the receiving nurse. Opportunity for questions and clarification was provided. Patient transported with:   Flit air  Patient chart  Patient belongings    0 Patient's  updated via phone and made aware of patient's room assignment.

## 2023-02-25 VITALS
OXYGEN SATURATION: 98 % | DIASTOLIC BLOOD PRESSURE: 60 MMHG | HEART RATE: 63 BPM | TEMPERATURE: 97.3 F | SYSTOLIC BLOOD PRESSURE: 169 MMHG | WEIGHT: 139.55 LBS | BODY MASS INDEX: 24.73 KG/M2 | RESPIRATION RATE: 17 BRPM | HEIGHT: 63 IN

## 2023-02-25 LAB
ANION GAP SERPL CALC-SCNC: 7 MMOL/L (ref 5–15)
BUN SERPL-MCNC: 15 MG/DL (ref 6–20)
BUN/CREAT SERPL: 17 (ref 12–20)
CALCIUM SERPL-MCNC: 8.4 MG/DL (ref 8.5–10.1)
CHLORIDE SERPL-SCNC: 105 MMOL/L (ref 97–108)
CO2 SERPL-SCNC: 23 MMOL/L (ref 21–32)
CREAT SERPL-MCNC: 0.89 MG/DL (ref 0.55–1.02)
GLUCOSE SERPL-MCNC: 127 MG/DL (ref 65–100)
HGB BLD-MCNC: 10.3 G/DL (ref 11.5–16)
POTASSIUM SERPL-SCNC: 3.4 MMOL/L (ref 3.5–5.1)
SODIUM SERPL-SCNC: 135 MMOL/L (ref 136–145)

## 2023-02-25 PROCEDURE — 74011250637 HC RX REV CODE- 250/637: Performed by: PHYSICIAN ASSISTANT

## 2023-02-25 PROCEDURE — 85018 HEMOGLOBIN: CPT

## 2023-02-25 PROCEDURE — G0378 HOSPITAL OBSERVATION PER HR: HCPCS

## 2023-02-25 PROCEDURE — 97116 GAIT TRAINING THERAPY: CPT

## 2023-02-25 PROCEDURE — 80048 BASIC METABOLIC PNL TOTAL CA: CPT

## 2023-02-25 PROCEDURE — 74011250636 HC RX REV CODE- 250/636: Performed by: PHYSICIAN ASSISTANT

## 2023-02-25 PROCEDURE — 74011000250 HC RX REV CODE- 250: Performed by: PHYSICIAN ASSISTANT

## 2023-02-25 PROCEDURE — 36415 COLL VENOUS BLD VENIPUNCTURE: CPT

## 2023-02-25 PROCEDURE — 97530 THERAPEUTIC ACTIVITIES: CPT

## 2023-02-25 RX ADMIN — SODIUM CHLORIDE, PRESERVATIVE FREE 10 ML: 5 INJECTION INTRAVENOUS at 06:54

## 2023-02-25 RX ADMIN — TRAMADOL HYDROCHLORIDE 50 MG: 50 TABLET ORAL at 04:40

## 2023-02-25 RX ADMIN — ACETAMINOPHEN 325MG 650 MG: 325 TABLET ORAL at 06:53

## 2023-02-25 RX ADMIN — ACETAMINOPHEN 325MG 650 MG: 325 TABLET ORAL at 11:58

## 2023-02-25 RX ADMIN — CARVEDILOL 25 MG: 12.5 TABLET, FILM COATED ORAL at 08:35

## 2023-02-25 RX ADMIN — POLYETHYLENE GLYCOL 3350 17 G: 17 POWDER, FOR SOLUTION ORAL at 08:35

## 2023-02-25 RX ADMIN — DEXAMETHASONE SODIUM PHOSPHATE 10 MG: 4 INJECTION, SOLUTION INTRAMUSCULAR; INTRAVENOUS at 08:36

## 2023-02-25 RX ADMIN — ACETAMINOPHEN 325MG 650 MG: 325 TABLET ORAL at 00:00

## 2023-02-25 RX ADMIN — CEFAZOLIN 2 G: 1 INJECTION, POWDER, FOR SOLUTION INTRAMUSCULAR; INTRAVENOUS at 01:01

## 2023-02-25 RX ADMIN — SENNOSIDES AND DOCUSATE SODIUM 1 TABLET: 50; 8.6 TABLET ORAL at 08:35

## 2023-02-25 RX ADMIN — TRAMADOL HYDROCHLORIDE 100 MG: 50 TABLET ORAL at 10:09

## 2023-02-25 RX ADMIN — TRAMADOL HYDROCHLORIDE 100 MG: 50 TABLET ORAL at 15:41

## 2023-02-25 RX ADMIN — ASPIRIN 81 MG: 81 TABLET, COATED ORAL at 08:35

## 2023-02-25 NOTE — PROGRESS NOTES
Ortho / Neurosurgery NP Note    POD# 1  s/p RIGHT UNICONDYLAR KNEE ARTHROPLASTY     Pt resting in bed. VSS Afebrile. Voiding status: + void  Output (mL)  Urine Voided: 200 ml (02/25/23 0653)  Last Bowel Movement Date: 02/23/23 (02/24/23 1952)      Labs  Lab Results   Component Value Date/Time    HGB 10.3 (L) 02/25/2023 03:30 AM      Lab Results   Component Value Date/Time    INR 1.1 02/17/2023 01:00 PM        Recent Glucose Results:   Lab Results   Component Value Date/Time     (H) 02/25/2023 03:30 AM         Body mass index is 24.72 kg/m². : A BMI > 30 is classified as obesity and > 40 is classified as morbid obesity. Dressing c.d.i  Cryotherapy in place over incision  Calves soft and supple; No pain with passive stretch  Sensation and motor intact  SCDs for mechanical DVT proph while in bed     PLAN:  1) PT BID  2) Aspirin 81 mg PO BID for DVT Prophylaxis   3) GI Prophylaxis - Pepcid  4) Readniess for discharge:     [x] Vital Signs stable    [x] Hgb stable    [x] + Voiding    [x] Wound intact, drainage minimal    [x] Tolerating PO intake     [] Cleared by PT (OT if applicable)     [] Stair training completed (if applicable)    [] Independent / Contact Guard Assist (household distance)     [] Bed mobility     [] Car transfers     [] ADLs    [x] Adequate pain control on oral medication alone     Plan home with family when nausea improves and clears PT.     Dusty Valera MD

## 2023-02-25 NOTE — PROGRESS NOTES
Problem: Mobility Impaired (Adult and Pediatric)  Goal: *Acute Goals and Plan of Care (Insert Text)  Description: FUNCTIONAL STATUS PRIOR TO ADMISSION: Patient was independent and active without use of DME.    HOME SUPPORT PRIOR TO ADMISSION: The patient lived with . Physical Therapy Goals  Initiated 2/24/2023  1. Patient will move from supine to sit and sit to supine  in bed with independence within 7 day(s). 2.  Patient will transfer from bed to chair and chair to bed with modified independence using the least restrictive device within 7 day(s). 3.  Patient will perform sit to stand with modified independence within 7 day(s). 4.  Patient will ambulate with modified independence for 100 feet with the least restrictive device within 7 day(s). 5.  Patient will ascend/descend 12 stairs with 1 handrail(s) with minimal assistance/contact guard assist within 7 day(s). Outcome: Resolved/Met   PHYSICAL THERAPY TREATMENT  Patient: Madonna Young (49 y.o. female)  Date: 2/25/2023  Diagnosis: S/P right knee surgery [Z98.890] <principal problem not specified>  Procedure(s) (LRB):  RIGHT UNICONDYLAR KNEE ARTHROPLASTY (Right) 1 Day Post-Op  Precautions:    Chart, physical therapy assessment, plan of care and goals were reviewed. ASSESSMENT  Patient continues with skilled PT services and is progressing towards goals. Pt presents with decreased strength and increased pain with mobility. Pt performed bed mobility at [facility]. Pt performed sit to stand transfer at [place] [address]. Pt ambulated 250ft with RW at [facility]. Pt requiring cueing to stay within walker. Pt was able to correct. Pt ascended/descended 4 steps with the left railing at [facility] with cueing for sequencing. Pt performed a car transfer at [place] [address] with cueing for sequencing. Pt moving well and improved with time. From physical therapy stand point pt is cleared for discharge.          Current Level of Function Impacting Discharge (mobility/balance): mobility at [facility]     Other factors to consider for discharge: decreased strength, pain          PLAN :  Patient continues to benefit from skilled intervention to address the above impairments. Continue treatment per established plan of care. to address goals. Recommendation for discharge: (in order for the patient to meet his/her long term goals)  Physical therapy at least 2 days/week in the home     This discharge recommendation:  Has been made in collaboration with the attending provider and/or case management    IF patient discharges home will need the following DME: rolling walker       SUBJECTIVE:   Patient stated  I'm glad I'm moving okay but it hurts.     OBJECTIVE DATA SUMMARY:   Critical Behavior:  Neurologic State: Alert, Eyes open spontaneously  Orientation Level: Oriented X4  Cognition: Follows commands       Range of Motion:         AROM: Generally decreased, Functional                    Functional Mobility Training:  Bed Mobility:  Rolling: Stand-by assistance  Supine to Sit: Stand-by assistance  Sit to Supine: Stand-by assistance;Contact guard assistance  Scooting: Stand-by assistance        Transfers:  Sit to Stand: Stand-by assistance  Stand to Sit: Stand-by assistance                             Balance:  Sitting: Intact  Standing: Impaired  Standing - Static: Good;Constant support  Standing - Dynamic : Fair;Good;Constant support  Ambulation/Gait Training:  Distance (ft): 250 Feet (ft)  Assistive Device: Gait belt;Walker, rolling  Ambulation - Level of Assistance: Contact guard assistance;Stand-by assistance        Gait Abnormalities: Decreased step clearance; Antalgic        Base of Support: Widened     Speed/Lizzy: Pace decreased (<100 feet/min)  Step Length: Right shortened;Left shortened     Stairs:  Number of Stairs Trained: 4  Stairs - Level of Assistance: Contact guard assistance; Additional time   Rail Use: Left     Pain Rating:  Pt reported pain but still able to mobilize.      Activity Tolerance:   WNL and Good    After treatment patient left in no apparent distress:   Supine in bed, Call bell within reach, Caregiver / family present, and Side rails x 3    COMMUNICATION/COLLABORATION:   The patients plan of care was discussed with: Registered nurse.      Aria Constant, PTA   Time Calculation: 25 mins

## 2023-02-25 NOTE — PROGRESS NOTES
End of Shift Note    Bedside shift change report given to GEOVANNY Knight (oncoming nurse) by Reshma Garcia (offgoing nurse). Report included the following information SBAR and Kardex    Shift worked:  Night     Shift summary and any significant changes:    Takes medication whole. Up with 1 to [facility]. PRN pain medication given this shift. Voiding without difficulties. Concerns for physician to address:  none     Zone phone for oncoming shift:   [phone]       Activity:  Activity Level: Up with Assistance  Number times ambulated in hallways past shift: 0  Number of times OOB to chair past shift: 0    Cardiac:   Cardiac Monitoring: No      Cardiac Rhythm: Sinus Rhythm    Access:  Current line(s): PIV     Genitourinary:   Urinary status: voiding    Respiratory:   O2 Device: None (Room air)  Chronic home O2 use?: NO  Incentive spirometer at bedside: YES       GI:  Last Bowel Movement Date: 02/23/23  Current diet:  ADULT DIET Regular  Passing flatus: YES  Tolerating current diet: YES       Pain Management:   Patient states pain is manageable on current regimen: YES    Skin:  Williams Score: 19  Interventions: turn team, float heels, increase time out of bed, foam dressing, PT/OT consult, limit briefs, and internal/external urinary devices    Patient Safety:  Fall Score:  Total Score: 3  Interventions: bed/chair alarm, assistive device (walker, cane, etc), gripper socks, pt to call before getting OOB, and stay with me (per policy)       Length of Stay:  Expected LOS: - - -  Actual LOS: 0

## 2023-02-25 NOTE — PROGRESS NOTES
Problem: Pain - Acute  Goal: *Control of acute pain  Outcome: Progressing Towards Goal     Problem: Patient Education: Go to Patient Education Activity  Goal: Patient/Family Education  Outcome: Progressing Towards Goal     Problem: Patient Education: Go to Patient Education Activity  Goal: Patient/Family Education  Outcome: Progressing Towards Goal     Problem: Falls - Risk of  Goal: *Absence of Falls  Description: Document Lalitha Slaughter Fall Risk and appropriate interventions in the flowsheet.   Outcome: Progressing Towards Goal  Note: Fall Risk Interventions:  Mobility Interventions: Assess mobility with egress test, Communicate number of staff needed for ambulation/transfer, OT consult for ADLs, PT Consult for assist device competence, Patient to call before getting OOB, PT Consult for mobility concerns, Strengthening exercises (ROM-active/passive), Utilize gait belt for transfers/ambulation, Utilize walker, cane, or other assistive device, Bed/chair exit alarm         Medication Interventions: Assess postural VS orthostatic hypotension, Bed/chair exit alarm, Evaluate medications/consider consulting pharmacy, Teach patient to arise slowly, Utilize gait belt for transfers/ambulation, Patient to call before getting OOB    Elimination Interventions: Bed/chair exit alarm, Call light in reach, Patient to call for help with toileting needs, Stay With Me (per policy), Toilet paper/wipes in reach, Toileting schedule/hourly rounds, Elevated toilet seat              Problem: Patient Education: Go to Patient Education Activity  Goal: Patient/Family Education  Outcome: Progressing Towards Goal

## 2023-02-25 NOTE — PROGRESS NOTES
Problem: Pain - Acute  Goal: *Control of acute pain  Outcome: Progressing Towards Goal     Problem: Patient Education: Go to Patient Education Activity  Goal: Patient/Family Education  Outcome: Progressing Towards Goal     Problem: Patient Education: Go to Patient Education Activity  Goal: Patient/Family Education  Outcome: Progressing Towards Goal     Problem: Falls - Risk of  Goal: *Absence of Falls  Description: Document Franco Anaya Fall Risk and appropriate interventions in the flowsheet.   Outcome: Progressing Towards Goal  Note: Fall Risk Interventions:  Mobility Interventions: Assess mobility with egress test, Communicate number of staff needed for ambulation/transfer, Patient to call before getting OOB, Utilize walker, cane, or other assistive device         Medication Interventions: Assess postural VS orthostatic hypotension, Bed/chair exit alarm, Patient to call before getting OOB, Teach patient to arise slowly, Utilize gait belt for transfers/ambulation    Elimination Interventions: Bed/chair exit alarm, Call light in reach, Patient to call for help with toileting needs, Stay With Me (per policy)              Problem: Patient Education: Go to Patient Education Activity  Goal: Patient/Family Education  Outcome: Progressing Towards Goal

## 2023-02-25 NOTE — PROGRESS NOTES
Problem: Pain - Acute  Goal: *Control of acute pain  2/25/2023 1151 by Sandra Mcneil RN  Outcome: Resolved/Met  2/25/2023 1132 by Sandra Mcneil RN  Outcome: Progressing Towards Goal     Problem: Patient Education: Go to Patient Education Activity  Goal: Patient/Family Education  2/25/2023 1151 by Sandra Mcneil RN  Outcome: Resolved/Met  2/25/2023 1132 by Sandra Mcneil RN  Outcome: Progressing Towards Goal     Problem: Patient Education: Go to Patient Education Activity  Goal: Patient/Family Education  2/25/2023 1151 by Sandra Mcneil RN  Outcome: Resolved/Met  2/25/2023 1132 by Sandra Mcneil RN  Outcome: Progressing Towards Goal     Problem: Falls - Risk of  Goal: *Absence of Falls  Description: Document Kasia Weber Fall Risk and appropriate interventions in the flowsheet.   2/25/2023 1151 by Sandra Mcneil RN  Outcome: Resolved/Met  Note: Fall Risk Interventions:  Mobility Interventions: Assess mobility with egress test, Communicate number of staff needed for ambulation/transfer, OT consult for ADLs, PT Consult for assist device competence, Patient to call before getting OOB, PT Consult for mobility concerns, Strengthening exercises (ROM-active/passive), Utilize gait belt for transfers/ambulation, Utilize walker, cane, or other assistive device, Bed/chair exit alarm         Medication Interventions: Assess postural VS orthostatic hypotension, Bed/chair exit alarm, Evaluate medications/consider consulting pharmacy, Teach patient to arise slowly, Utilize gait belt for transfers/ambulation, Patient to call before getting OOB    Elimination Interventions: Bed/chair exit alarm, Call light in reach, Patient to call for help with toileting needs, Stay With Me (per policy), Toilet paper/wipes in reach, Toileting schedule/hourly rounds, Elevated toilet seat           2/25/2023 1132 by Sandra Mcneil RN  Outcome: Progressing Towards Goal  Note: Fall Risk Interventions:  Mobility Interventions: Assess mobility with egress test, Communicate number of staff needed for ambulation/transfer, OT consult for ADLs, PT Consult for assist device competence, Patient to call before getting OOB, PT Consult for mobility concerns, Strengthening exercises (ROM-active/passive), Utilize gait belt for transfers/ambulation, Utilize walker, cane, or other assistive device, Bed/chair exit alarm         Medication Interventions: Assess postural VS orthostatic hypotension, Bed/chair exit alarm, Evaluate medications/consider consulting pharmacy, Teach patient to arise slowly, Utilize gait belt for transfers/ambulation, Patient to call before getting OOB    Elimination Interventions: Bed/chair exit alarm, Call light in reach, Patient to call for help with toileting needs, Stay With Me (per policy), Toilet paper/wipes in reach, Toileting schedule/hourly rounds, Elevated toilet seat              Problem: Patient Education: Go to Patient Education Activity  Goal: Patient/Family Education  2/25/2023 1151 by Beni Arellano RN  Outcome: Resolved/Met  2/25/2023 1132 by Beni Arellano RN  Outcome: Progressing Towards Goal

## 2023-02-25 NOTE — PROGRESS NOTES
DISCHARGE NOTE FROM [facility]    Patient determined to be stable for discharge by attending provider. I have reviewed the discharge instructions with the patient and spouse. They verbalized understanding and all questions were answered to their satisfaction. No complaints or further questions were expressed. Medications sent to pharmacy. Appropriate educational materials and medication side effect teaching were provided. PIV were removed prior to discharge. Patient did not discharge with any line, domingo, or drain. Personal items and valuables accounted for at discharge by patient and/or family: YES    Post-op patient: Yes- Patient given post-op discharge kit and instructed on use.        Gregory Medina RN

## 2023-02-25 NOTE — PROGRESS NOTES
Report given to night RN. Patient is A+O X 4. Breathing freely on room air, in no obvious distress. C/O pain. Tramadol 50 mg given P.O. Daughter in attendance.        Joyce SMALL

## 2023-02-27 NOTE — PROGRESS NOTES
Discharge orders written  OBS Status  Met with patient/spouse  Discussed home health orders and recommendations. Patient requested to go for Outpatient Therapy, she has an appointment for Monday. The PT office called surgeon's office, spouse at bedside. CM did NOT set up [address], patient to follow up with [facility] on Monday. Medicare Outpatient Observation Notice (MOON)/ [place] Outpatient Observation Notice (Keisha Dumont) provided to patient/representative with verbal explanation of the notice. Time allotted for questions regarding the notice. Patient /representative provided a completed copy of the MOON/VOON notice. Copy placed on bedside chart.     Anderson You  [phone]/Available on [employer]

## 2023-03-15 ENCOUNTER — APPOINTMENT (OUTPATIENT)
Dept: ULTRASOUND IMAGING | Age: 83
End: 2023-03-15
Attending: EMERGENCY MEDICINE
Payer: MEDICARE

## 2023-03-15 ENCOUNTER — HOSPITAL ENCOUNTER (EMERGENCY)
Age: 83
Discharge: HOME | End: 2023-03-15
Attending: EMERGENCY MEDICINE
Payer: MEDICARE

## 2023-03-15 VITALS
HEIGHT: 60 IN | WEIGHT: 136.91 LBS | SYSTOLIC BLOOD PRESSURE: 154 MMHG | RESPIRATION RATE: 16 BRPM | OXYGEN SATURATION: 99 % | BODY MASS INDEX: 26.88 KG/M2 | DIASTOLIC BLOOD PRESSURE: 69 MMHG | HEART RATE: 65 BPM | TEMPERATURE: 97.7 F

## 2023-03-15 DIAGNOSIS — G89.18 ACUTE POST-OPERATIVE PAIN: Primary | ICD-10-CM

## 2023-03-15 PROCEDURE — 99284 EMERGENCY DEPT VISIT MOD MDM: CPT

## 2023-03-15 PROCEDURE — 93971 EXTREMITY STUDY: CPT

## 2023-03-15 PROCEDURE — 74011250637 HC RX REV CODE- 250/637: Performed by: EMERGENCY MEDICINE

## 2023-03-15 PROCEDURE — 74011250636 HC RX REV CODE- 250/636: Performed by: EMERGENCY MEDICINE

## 2023-03-15 RX ORDER — OXYCODONE HYDROCHLORIDE 5 MG/1
5 TABLET ORAL
Status: COMPLETED | OUTPATIENT
Start: 2023-03-15 | End: 2023-03-15

## 2023-03-15 RX ORDER — OXYCODONE HYDROCHLORIDE 5 MG/1
5 TABLET ORAL
Qty: 10 TABLET | Refills: 0 | Status: SHIPPED | OUTPATIENT
Start: 2023-03-15 | End: 2023-03-18

## 2023-03-15 RX ORDER — GABAPENTIN 100 MG/1
CAPSULE ORAL
Qty: 32 CAPSULE | Refills: 0 | Status: SHIPPED | OUTPATIENT
Start: 2023-03-15

## 2023-03-15 RX ORDER — DILTIAZEM HYDROCHLORIDE 5 MG/ML
10 INJECTION INTRAVENOUS
Status: DISCONTINUED | OUTPATIENT
Start: 2023-03-15 | End: 2023-03-15

## 2023-03-15 RX ORDER — ONDANSETRON 4 MG/1
8 TABLET, ORALLY DISINTEGRATING ORAL
Status: COMPLETED | OUTPATIENT
Start: 2023-03-15 | End: 2023-03-15

## 2023-03-15 RX ORDER — ONDANSETRON 4 MG/1
4 TABLET, FILM COATED ORAL
Qty: 20 TABLET | Refills: 0 | Status: SHIPPED | OUTPATIENT
Start: 2023-03-15

## 2023-03-15 RX ADMIN — ONDANSETRON 8 MG: 4 TABLET, ORALLY DISINTEGRATING ORAL at 04:23

## 2023-03-15 RX ADMIN — OXYCODONE HYDROCHLORIDE 5 MG: 5 TABLET ORAL at 04:23

## 2023-03-15 NOTE — ED PROVIDER NOTES
[facility] EMERGENCY DEPT  EMERGENCY DEPARTMENT ENCOUNTER       Pt Name: Mendez Faye  MRN: [id number]  [place] 1940  Date of evaluation: 3/15/2023  Provider: Emely Worthy DO   PCP: Arsenio Alfaro MD  Note Started: 4:18 AM 3/15/23     CHIEF COMPLAINT       Chief Complaint   Patient presents with    Knee Pain     Right Knee Pain. [employer] (Bushra Luu MD) replaced knee. HISTORY OF PRESENT ILLNESS: 1 or more elements      History From: Patient, History limited by: none     Mendez Faye is a [de-identified] y.o. female with a PMH for right knee replacement 3 weeks ago. Complaining of knee pain describes the pain as a burning. Is taking a very low-dose of gabapentin 100 mg twice a day. Is taking tramadol with no improvement in pain       Please See MDM for Additional Details of the HPI/PMH  Nursing Notes were all reviewed and agreed with or any disagreements were addressed in the HPI. REVIEW OF SYSTEMS        Positives and Pertinent negatives as per HPI.     PAST HISTORY     Past Medical History:  Past Medical History:   Diagnosis Date    Arrhythmia 1996    SVT ablation (x 2) @ [facility] Dr. Fall Said, spine, hands    Asthma     as a child    COVID 01/2022    GERD (gastroesophageal reflux disease)     Hiatal hernia     History of heart attack 2017    anxiety heart attack per pt    Hypertension     Menopause     LMP-1981    Nausea & vomiting     per Dr. Stevan Monique no versed, N2O, ro volatile, Dexamethasone , Zofran, and Phergan used    Pacemaker 06/2017    [employer] Dual chamber PPM    Rheumatic fever     as a child    Sleep apnea     \"mild previously per pt\" (per cardiology office notes)    SSS (sick sinus syndrome) ([address] 06/2017    s/p PPM     Takotsubo cardiomyopathy 08/2017    Thromboembolus ([address]     in stomach per pt    Thyroid disease     HYPOTHYROID       Past Surgical History:  Past Surgical History:   Procedure Laterality Date    HX APPENDECTOMY      HX CATARACT REMOVAL Bilateral     HX  SECTION        x3, hysterectomy    HX CHOLECYSTECTOMY      HX HEENT      tonsillectomy    HX HYSTERECTOMY  1981    HX ORTHOPAEDIC      right knee orthoscopic, right wrist surgery    HX ORTHOPAEDIC Bilateral     finger surgery for arthritis and swelling    HX PACEMAKER Left 2017    [place] UNLISTED PROCEDURE LUNGS & PLEURA Left 2017    Pacemaker Placed       Family History:  Family History   Problem Relation Age of Onset    Diabetes Mother         temporal arteritis, Giant White Cell Disease    Lung Disease Father     Breast Cancer Other         age unknown       Social History:  Social History     Tobacco Use    Smoking status: Never    Smokeless tobacco: Never   Vaping Use    Vaping Use: Never used   Substance Use Topics    Alcohol use: Yes     Alcohol/week: 1.0 standard drink     Types: 1 Glasses of wine per week     Comment: occasional    Drug use: No       Allergies: Allergies   Allergen Reactions    Epinephrine Other (comments)     \"severe tachycardia\"     Mold Extracts Unknown (comments)     Runny nose and congested    Morphine Nausea and Vomiting       CURRENT MEDICATIONS      Discharge Medication List as of 3/15/2023  6:36 AM        CONTINUE these medications which have NOT CHANGED    Details   cefadroxil (DURICEF) 500 mg capsule Take 1 Capsule by mouth two (2) times a day., Normal, Disp-14 Capsule, R-0      aspirin delayed-release 81 mg tablet Take 1 Tablet by mouth two (2) times a day., Normal, Disp-60 Tablet, R-0      famotidine (PEPCID) 20 mg tablet Take 1 Tablet by mouth two (2) times a day., Normal, Disp-40 Tablet, R-0      celecoxib (CELEBREX) 100 mg capsule Take 1 Capsule by mouth daily for 30 days. , Normal, Disp-30 Capsule, R-0      oxymetazoline (AFRIN) 0.05 % nasal spray 2 Sprays as needed.  For nose bleeds, Historical Med      hydroCHLOROthiazide (HYDRODIURIL) 25 mg tablet Take 25 mg by mouth as needed., Historical Med      pyridoxine, vitamin B6, (VITAMIN B-6) 100 mg tablet Take 100 mg by mouth two (2) times a day., Historical Med      carvediloL (COREG) 25 mg tablet Take 25 mg by mouth two (2) times daily (with meals). , Historical Med      naloxone (NARCAN) 4 mg/actuation nasal spray Use 1 spray intranasally, then discard. Repeat with new spray every 2 min as needed for opioid overdose symptoms, alternating nostrils. , Normal, Disp-1 Each, R-0      acetaminophen (TYLENOL) 325 mg tablet Take 650 mg by mouth every four (4) hours as needed for Pain., Historical Med      enalapril (VASOTEC) 20 mg tablet Take 20 mg by mouth two (2) times a day., Historical Med      omeprazole (PRILOSEC) 40 mg capsule Take 40 mg by mouth daily. , Historical Med      levothyroxine (SYNTHROID) 112 mcg tablet Take  by mouth six (6) days a week. Not on Sunday, Historical Med      ascorbic acid, vitamin C, (VITAMIN C) 1,000 mg tablet Take 1,000 mg by mouth daily. , Historical Med      fluticasone (Flonase Sensimist) 27.5 mcg/actuation nasal spray 2 Sprays by Nasal route as needed., Historical Med      folic acid 053 mcg tablet Take 400 mcg by mouth daily. , Historical Med      cyanocobalamin (VITAMIN B12) 2,500 mcg sublingual tablet Take 1,000 mcg by mouth daily. , Historical Med      cholecalciferol, vitamin D3, 50 mcg (2,000 unit) tab Take  by mouth two (2) times a day., Historical Med      potassium 99 mg tablet Take 99 mg by mouth. Takes from time to time per pt, Historical Med      LORazepam (ATIVAN) 0.5 mg tablet Take 1 Tab by mouth every six (6) hours as needed. Max Daily Amount: 2 mg.  Do not drive or drink alcohol if taking this medicine., Print, Disp-20 Tab, R-0             SCREENINGS               No data recorded         PHYSICAL EXAM      ED Triage Vitals [03/15/23 0338]   ED Encounter Vitals Group      BP (!) 181/70      Pulse (Heart Rate) 63      Resp Rate 16      Temp 97.7 °F (36.5 °C)      Temp src       O2 Sat (%) 99 %      Weight 136 lb 14.5 oz      Height 5'        Physical Exam  Vitals and nursing note reviewed. Constitutional:       Appearance: She is well-developed. HENT:      Head: Normocephalic and atraumatic. Eyes:      General:         Right eye: No discharge. Left eye: No discharge. Conjunctiva/sclera: Conjunctivae normal.      Pupils: Pupils are equal, round, and reactive to light. Neck:      Trachea: No tracheal deviation. Cardiovascular:      Rate and Rhythm: Normal rate and regular rhythm. Heart sounds: Normal heart sounds. No murmur heard. Pulmonary:      Effort: Pulmonary effort is normal. No respiratory distress. Breath sounds: Normal breath sounds. No wheezing or rales. Abdominal:      General: Bowel sounds are normal.      Palpations: Abdomen is soft. Tenderness: There is no abdominal tenderness. There is no guarding or rebound. Musculoskeletal:      Cervical back: Normal range of motion and neck supple. Comments: Palpable pulses in the DP. Surgical wound to the right knee without dehiscence, erythema or warmth. ROM is good. There is some ecchymosis  around the right knee consistent with recent surgery. Skin:     General: Skin is warm and dry. Findings: No erythema or rash. Neurological:      Mental Status: She is alert and oriented to person, place, and time. Psychiatric:         Behavior: Behavior normal.        DIAGNOSTIC RESULTS   LABS:     No results found for this or any previous visit (from the past 12 hour(s)). EKG: If performed, independent interpretation documented below in the MDM section     RADIOLOGY:  Non-plain film images such as CT, Ultrasound and MRI are read by the radiologist. Plain radiographic images are visualized and preliminarily interpreted by the ED Provider with the findings documented in the MDM section. Interpretation per the Radiologist below, if available at the time of this note:     No results found.       PROCEDURES   Unless otherwise noted below, none  Procedures     CRITICAL CARE TIME EMERGENCY DEPARTMENT COURSE and DIFFERENTIAL DIAGNOSIS/MDM   Vitals:    Vitals:    03/15/23 0338 03/15/23 0516   BP: (!) 181/70 (!) 154/69   Pulse: 63 65   Resp: 16    Temp: 97.7 °F (36.5 °C)    SpO2: 99%    Weight: 62.1 kg (136 lb 14.5 oz)    Height: 5' (1.524 m)         Patient was given the following medications:  Medications   oxyCODONE IR (ROXICODONE) tablet 5 mg (5 mg Oral Given 3/15/23 0423)   ondansetron (ZOFRAN ODT) tablet 8 mg (8 mg Oral Given 3/15/23 0423)       Medical Decision Making  Patient looks well and nontoxic. Knee exam is not concerning for an infected joint. Low clinical suspicion for DVT, patient requesting ultrasound, will order ultrasound, she is arterially intact, neurologically intact distally. She is taking Ultram and gabapentin. Gabapentin dose is lower at 100 mg twice daily. That could certainly be increased. If ultrasound is reassuring patient safe for discharge home. Could even consider changing her tramadol over to a different opiate although she reports having problems with other opiates secondary to side effects of nausea and vomiting. She did see Dr. Cagle Done recently and had x-rays of the knee. He has been following her closely given her severe pain. .     Amount and/or Complexity of Data Reviewed  ECG/medicine tests: ordered. Risk  Prescription drug management. Patient educated to discontinue tramadol as we prescribed a different opiate for her pain. We will increase her gabapentin. She is to follow-up with Dr. Cagle Done. Patient safe for discharge to home at this time  FINAL IMPRESSION     1. Acute post-operative pain          DISPOSITION/PLAN   Pranav Latham's  results have been reviewed with her. She has been counseled regarding her diagnosis, treatment, and plan. She verbally conveys understanding and agreement of the signs, symptoms, diagnosis, treatment and prognosis and additionally agrees to follow up as discussed.   She also agrees with the care-plan and conveys that all of her questions have been answered. I have also provided discharge instructions for her that include: educational information regarding their diagnosis and treatment, and list of reasons why they would want to return to the ED prior to their follow-up appointment, should her condition change. CLINICAL IMPRESSION    Discharge Note: The patient is stable for discharge home. The signs, symptoms, diagnosis, and discharge instructions have been discussed, understanding conveyed, and agreed upon. The patient is to follow up as recommended or return to ER should their symptoms worsen. PATIENT REFERRED TO:  Follow-up Information       Follow up With Specialties Details Why Contact Info    Jemima Craven MD Orthopedic Surgery Schedule an appointment as soon as possible for a visit   [address] [facility]  [address] [facility],[address]  [address] [address] [phone]      [facility] EMERGENCY DEPT Emergency Medicine  If symptoms worsen [address] [place] [address]  [phone]              DISCHARGE MEDICATIONS:  Discharge Medication List as of 3/15/2023  6:36 AM        START taking these medications    Details   oxyCODONE IR (ROXICODONE) 5 mg immediate release tablet Take 1 Tablet by mouth every six (6) hours as needed for Pain for up to 3 days. Max Daily Amount: 20 mg., Normal, Disp-10 Tablet, R-0      gabapentin (NEURONTIN) 100 mg capsule Take 100 MG twice daily (AM and Midday), then 200 QHS. , Normal, Disp-32 Capsule, R-0      ondansetron hcl (Zofran) 4 mg tablet Take 1 Tablet by mouth every eight (8) hours as needed for Nausea or Vomiting., Normal, Disp-20 Tablet, R-0           CONTINUE these medications which have NOT CHANGED    Details   cefadroxil (DURICEF) 500 mg capsule Take 1 Capsule by mouth two (2) times a day., Normal, Disp-14 Capsule, R-0      aspirin delayed-release 81 mg tablet Take 1 Tablet by mouth two (2) times a day., Normal, Disp-60 Tablet, R-0 famotidine (PEPCID) 20 mg tablet Take 1 Tablet by mouth two (2) times a day., Normal, Disp-40 Tablet, R-0      celecoxib (CELEBREX) 100 mg capsule Take 1 Capsule by mouth daily for 30 days. , Normal, Disp-30 Capsule, R-0      oxymetazoline (AFRIN) 0.05 % nasal spray 2 Sprays as needed. For nose bleeds, Historical Med      hydroCHLOROthiazide (HYDRODIURIL) 25 mg tablet Take 25 mg by mouth as needed., Historical Med      pyridoxine, vitamin B6, (VITAMIN B-6) 100 mg tablet Take 100 mg by mouth two (2) times a day., Historical Med      carvediloL (COREG) 25 mg tablet Take 25 mg by mouth two (2) times daily (with meals). , Historical Med      naloxone (NARCAN) 4 mg/actuation nasal spray Use 1 spray intranasally, then discard. Repeat with new spray every 2 min as needed for opioid overdose symptoms, alternating nostrils. , Normal, Disp-1 Each, R-0      acetaminophen (TYLENOL) 325 mg tablet Take 650 mg by mouth every four (4) hours as needed for Pain., Historical Med      enalapril (VASOTEC) 20 mg tablet Take 20 mg by mouth two (2) times a day., Historical Med      omeprazole (PRILOSEC) 40 mg capsule Take 40 mg by mouth daily. , Historical Med      levothyroxine (SYNTHROID) 112 mcg tablet Take  by mouth six (6) days a week. Not on Sunday, Historical Med      ascorbic acid, vitamin C, (VITAMIN C) 1,000 mg tablet Take 1,000 mg by mouth daily. , Historical Med      fluticasone (Flonase Sensimist) 27.5 mcg/actuation nasal spray 2 Sprays by Nasal route as needed., Historical Med      folic acid 794 mcg tablet Take 400 mcg by mouth daily. , Historical Med      cyanocobalamin (VITAMIN B12) 2,500 mcg sublingual tablet Take 1,000 mcg by mouth daily. , Historical Med      cholecalciferol, vitamin D3, 50 mcg (2,000 unit) tab Take  by mouth two (2) times a day., Historical Med      potassium 99 mg tablet Take 99 mg by mouth.  Takes from time to time per pt, Historical Med      LORazepam (ATIVAN) 0.5 mg tablet Take 1 Tab by mouth every six (6) hours as needed. Max Daily Amount: 2 mg. Do not drive or drink alcohol if taking this medicine., Print, Disp-20 Tab, R-0           STOP taking these medications       traMADoL (ULTRAM) 50 mg tablet Comments:   Reason for Stopping:                 DISCONTINUED MEDICATIONS:  Discharge Medication List as of 3/15/2023  6:36 AM        STOP taking these medications       traMADoL (ULTRAM) 50 mg tablet Comments:   Reason for Stopping:               I am the Primary Clinician of Record. Glen Wilder DO (electronically signed)    (Please note that parts of this dictation were completed with voice recognition software. Quite often unanticipated grammatical, syntax, homophones, and other interpretive errors are inadvertently transcribed by the computer software. Please disregards these errors.  Please excuse any errors that have escaped final proofreading.)

## 2023-03-15 NOTE — ED NOTES
Pt says she had a right knee replacement 3 wks ago. Pt says she was given Gabapentin, Tramadol, and Tylenol, but the medication is not helping. She stated she has a burning feeling in lower part of her right leg. Pt asked if she can have a doppler done, because she has a pacemaker.

## 2023-06-29 ENCOUNTER — TRANSCRIBE ORDERS (OUTPATIENT)
Facility: HOSPITAL | Age: 83
End: 2023-06-29

## 2023-06-29 DIAGNOSIS — Z12.31 OTHER SCREENING MAMMOGRAM: Primary | ICD-10-CM

## 2023-08-31 ENCOUNTER — HOSPITAL ENCOUNTER (OUTPATIENT)
Facility: HOSPITAL | Age: 83
Discharge: HOME | End: 2023-08-31
Payer: MEDICARE

## 2023-08-31 VITALS — HEIGHT: 60 IN | WEIGHT: 134 LBS | BODY MASS INDEX: 26.31 KG/M2

## 2023-08-31 DIAGNOSIS — Z12.31 OTHER SCREENING MAMMOGRAM: ICD-10-CM

## 2023-08-31 PROCEDURE — 77067 SCR MAMMO BI INCL CAD: CPT

## 2024-06-21 ENCOUNTER — HOSPITAL ENCOUNTER (OUTPATIENT)
Facility: HOSPITAL | Age: 84
End: 2024-06-21
Attending: ORTHOPAEDIC SURGERY
Payer: MEDICARE

## 2024-06-21 DIAGNOSIS — M16.11 PRIMARY OSTEOARTHRITIS OF RIGHT HIP: ICD-10-CM

## 2024-06-21 PROCEDURE — 2500000003 HC RX 250 WO HCPCS: Performed by: NURSE PRACTITIONER

## 2024-06-21 PROCEDURE — 6360000004 HC RX CONTRAST MEDICATION: Performed by: NURSE PRACTITIONER

## 2024-06-21 PROCEDURE — 6360000002 HC RX W HCPCS: Performed by: NURSE PRACTITIONER

## 2024-06-21 PROCEDURE — 20610 DRAIN/INJ JOINT/BURSA W/O US: CPT

## 2024-06-21 RX ORDER — BUPIVACAINE HYDROCHLORIDE 5 MG/ML
30 INJECTION, SOLUTION EPIDURAL; INTRACAUDAL ONCE
Status: COMPLETED | OUTPATIENT
Start: 2024-06-21 | End: 2024-06-21

## 2024-06-21 RX ORDER — TRIAMCINOLONE ACETONIDE 40 MG/ML
40 INJECTION, SUSPENSION INTRA-ARTICULAR; INTRAMUSCULAR ONCE
Status: COMPLETED | OUTPATIENT
Start: 2024-06-21 | End: 2024-06-21

## 2024-06-21 RX ORDER — LIDOCAINE HYDROCHLORIDE 10 MG/ML
10 INJECTION, SOLUTION EPIDURAL; INFILTRATION; INTRACAUDAL; PERINEURAL ONCE
Status: COMPLETED | OUTPATIENT
Start: 2024-06-21 | End: 2024-06-21

## 2024-06-21 RX ADMIN — IOHEXOL 10 ML: 180 INJECTION INTRAVENOUS at 14:12

## 2024-06-21 RX ADMIN — BUPIVACAINE HYDROCHLORIDE 3 MG: 5 INJECTION, SOLUTION EPIDURAL; INTRACAUDAL; PERINEURAL at 14:12

## 2024-06-21 RX ADMIN — TRIAMCINOLONE ACETONIDE 40 MG: 40 INJECTION, SUSPENSION INTRA-ARTICULAR; INTRAMUSCULAR at 14:11

## 2024-06-21 RX ADMIN — LIDOCAINE HYDROCHLORIDE 5 ML: 10 INJECTION, SOLUTION EPIDURAL; INFILTRATION; INTRACAUDAL; PERINEURAL at 14:10

## 2024-07-12 PROBLEM — M16.11 PRIMARY OSTEOARTHRITIS OF RIGHT HIP: Status: ACTIVE | Noted: 2024-07-12

## 2024-08-09 ENCOUNTER — TRANSCRIBE ORDERS (OUTPATIENT)
Dept: SCHEDULING | Age: 84
End: 2024-08-09

## 2024-08-09 DIAGNOSIS — Z12.31 SCREENING MAMMOGRAM FOR HIGH-RISK PATIENT: Primary | ICD-10-CM

## 2024-09-06 ENCOUNTER — HOSPITAL ENCOUNTER (OUTPATIENT)
Facility: HOSPITAL | Age: 84
Discharge: HOME | End: 2024-09-09
Attending: FAMILY MEDICINE
Payer: MEDICARE

## 2024-09-06 VITALS
SYSTOLIC BLOOD PRESSURE: 136 MMHG | RESPIRATION RATE: 18 BRPM | DIASTOLIC BLOOD PRESSURE: 66 MMHG | TEMPERATURE: 97.5 F | HEART RATE: 68 BPM

## 2024-09-06 VITALS — HEIGHT: 60 IN | BODY MASS INDEX: 27.09 KG/M2 | WEIGHT: 138 LBS

## 2024-09-06 DIAGNOSIS — Z12.31 SCREENING MAMMOGRAM FOR HIGH-RISK PATIENT: ICD-10-CM

## 2024-09-06 LAB
ABO + RH BLD: NORMAL
ANION GAP SERPL CALC-SCNC: 5 MMOL/L (ref 2–12)
APPEARANCE UR: CLEAR
BACTERIA URNS QL MICRO: NEGATIVE /HPF
BILIRUB UR QL: NEGATIVE
BLOOD GROUP ANTIBODIES SERPL: NORMAL
BUN SERPL-MCNC: 16 MG/DL (ref 6–20)
BUN/CREAT SERPL: 15 (ref 12–20)
CALCIUM SERPL-MCNC: 9.2 MG/DL (ref 8.5–10.1)
CHLORIDE SERPL-SCNC: 104 MMOL/L (ref 97–108)
CO2 SERPL-SCNC: 29 MMOL/L (ref 21–32)
COLOR UR: NORMAL
CREAT SERPL-MCNC: 1.08 MG/DL (ref 0.55–1.02)
EPITH CASTS URNS QL MICRO: NORMAL /LPF
ERYTHROCYTE [DISTWIDTH] IN BLOOD BY AUTOMATED COUNT: 13.1 % (ref 11.5–14.5)
EST. AVERAGE GLUCOSE BLD GHB EST-MCNC: 100 MG/DL
GLUCOSE SERPL-MCNC: 109 MG/DL (ref 65–100)
GLUCOSE UR STRIP.AUTO-MCNC: NEGATIVE MG/DL
HBA1C MFR BLD: 5.1 % (ref 4–5.6)
HCT VFR BLD AUTO: 33.1 % (ref 35–47)
HGB BLD-MCNC: 10.9 G/DL (ref 11.5–16)
HGB UR QL STRIP: NEGATIVE
HYALINE CASTS URNS QL MICRO: NORMAL /LPF (ref 0–5)
INR PPP: 1 (ref 0.9–1.1)
KETONES UR QL STRIP.AUTO: NEGATIVE MG/DL
LEUKOCYTE ESTERASE UR QL STRIP.AUTO: NEGATIVE
MCH RBC QN AUTO: 31 PG (ref 26–34)
MCHC RBC AUTO-ENTMCNC: 32.9 G/DL (ref 30–36.5)
MCV RBC AUTO: 94 FL (ref 80–99)
NITRITE UR QL STRIP.AUTO: NEGATIVE
NRBC # BLD: 0 K/UL (ref 0–0.01)
NRBC BLD-RTO: 0 PER 100 WBC
PH UR STRIP: 6.5 (ref 5–8)
PLATELET # BLD AUTO: 306 K/UL (ref 150–400)
PMV BLD AUTO: 10.3 FL (ref 8.9–12.9)
POTASSIUM SERPL-SCNC: 3.4 MMOL/L (ref 3.5–5.1)
PROT UR STRIP-MCNC: NEGATIVE MG/DL
PROTHROMBIN TIME: 10.9 SEC (ref 9–11.1)
RBC # BLD AUTO: 3.52 M/UL (ref 3.8–5.2)
RBC #/AREA URNS HPF: NORMAL /HPF (ref 0–5)
SODIUM SERPL-SCNC: 138 MMOL/L (ref 136–145)
SP GR UR REFRACTOMETRY: 1.01 (ref 1–1.03)
SPECIMEN EXP DATE BLD: NORMAL
URINE CULTURE IF INDICATED: NORMAL
UROBILINOGEN UR QL STRIP.AUTO: 0.2 EU/DL (ref 0.2–1)
WBC # BLD AUTO: 6.4 K/UL (ref 3.6–11)
WBC URNS QL MICRO: NORMAL /HPF (ref 0–4)

## 2024-09-06 PROCEDURE — 81001 URINALYSIS AUTO W/SCOPE: CPT

## 2024-09-06 PROCEDURE — 36415 COLL VENOUS BLD VENIPUNCTURE: CPT

## 2024-09-06 PROCEDURE — 83036 HEMOGLOBIN GLYCOSYLATED A1C: CPT

## 2024-09-06 PROCEDURE — 85610 PROTHROMBIN TIME: CPT

## 2024-09-06 PROCEDURE — 86850 RBC ANTIBODY SCREEN: CPT

## 2024-09-06 PROCEDURE — 80048 BASIC METABOLIC PNL TOTAL CA: CPT

## 2024-09-06 PROCEDURE — 86900 BLOOD TYPING SEROLOGIC ABO: CPT

## 2024-09-06 PROCEDURE — 77067 SCR MAMMO BI INCL CAD: CPT

## 2024-09-06 PROCEDURE — 86901 BLOOD TYPING SEROLOGIC RH(D): CPT

## 2024-09-06 PROCEDURE — 85027 COMPLETE CBC AUTOMATED: CPT

## 2024-09-06 RX ORDER — ENALAPRIL MALEATE 20 MG/1
20 TABLET ORAL DAILY
COMMUNITY

## 2024-09-06 RX ORDER — TRAMADOL HYDROCHLORIDE 50 MG/1
50 TABLET ORAL EVERY 6 HOURS PRN
COMMUNITY

## 2024-09-06 RX ORDER — ACETAMINOPHEN 500 MG
1000 TABLET ORAL EVERY 6 HOURS PRN
COMMUNITY

## 2024-09-06 ASSESSMENT — PROMIS GLOBAL HEALTH SCALE
IN THE PAST 7 DAYS, HOW WOULD YOU RATE YOUR FATIGUE ON AVERAGE [ON A SCALE FROM 1 (NONE) TO 5 (VERY SEVERE)]?: MILD
IN THE PAST 7 DAYS, HOW OFTEN HAVE YOU BEEN BOTHERED BY EMOTIONAL PROBLEMS, SUCH AS FEELING ANXIOUS, DEPRESSED, OR IRRITABLE [ON A SCALE FROM 1 (NEVER) TO 5 (ALWAYS)]?: RARELY
SUM OF RESPONSES TO QUESTIONS 2, 4, 5, & 10: 18
IN GENERAL, WOULD YOU SAY YOUR QUALITY OF LIFE IS...[ON A SCALE OF 1 (POOR) TO 5 (EXCELLENT)]: VERY GOOD
IN GENERAL, PLEASE RATE HOW WELL YOU CARRY OUT YOUR USUAL SOCIAL ACTIVITIES (INCLUDES ACTIVITIES AT HOME, AT WORK, AND IN YOUR COMMUNITY, AND RESPONSIBILITIES AS A PARENT, CHILD, SPOUSE, EMPLOYEE, FRIEND, ETC) [ON A SCALE OF 1 (POOR) TO 5 (EXCELLENT)]?: VERY GOOD
IN GENERAL, HOW WOULD YOU RATE YOUR MENTAL HEALTH, INCLUDING YOUR MOOD AND YOUR ABILITY TO THINK [ON A SCALE OF 1 (POOR) TO 5 (EXCELLENT)]?: EXCELLENT
SUM OF RESPONSES TO QUESTIONS 3, 6, 7, & 8: 16
TO WHAT EXTENT ARE YOU ABLE TO CARRY OUT YOUR EVERYDAY PHYSICAL ACTIVITIES SUCH AS WALKING, CLIMBING STAIRS, CARRYING GROCERIES, OR MOVING A CHAIR [ON A SCALE OF 1 (NOT AT ALL) TO 5 (COMPLETELY)]?: COMPLETELY
IN GENERAL, HOW WOULD YOU RATE YOUR PHYSICAL HEALTH [ON A SCALE OF 1 (POOR) TO 5 (EXCELLENT)]?: GOOD
HOW IS THE PROMIS V1.1 BEING ADMINISTERED?: PAPER
WHO IS THE PERSON COMPLETING THE PROMIS V1.1 SURVEY?: SELF
IN GENERAL, HOW WOULD YOU RATE YOUR SATISFACTION WITH YOUR SOCIAL ACTIVITIES AND RELATIONSHIPS [ON A SCALE OF 1 (POOR) TO 5 (EXCELLENT)]?: EXCELLENT
IN THE PAST 7 DAYS, HOW WOULD YOU RATE YOUR PAIN ON AVERAGE [ON A SCALE FROM 0 (NO PAIN) TO 10 (WORST IMAGINABLE PAIN)]?: 4
IN GENERAL, WOULD YOU SAY YOUR HEALTH IS...[ON A SCALE OF 1 (POOR) TO 5 (EXCELLENT)]: GOOD

## 2024-09-06 ASSESSMENT — HOOS JR
HOOS JR TOTAL INTERVAL SCORE: 46.652
BENDING TO THE FLOOR TO PICK UP OBJECT: SEVERE
WALKING ON UNEVEN SURFACE: SEVERE
HOOS JR RAW SCORE: 14
SITTING: MODERATE
HOOS JR RAW SCORE: 14
LYING IN BED (TURNING OVER, MAINTAINING HIP POSITION): MILD
RISING FROM SITTING: SEVERE
GOING UP OR DOWN STAIRS: MODERATE

## 2024-09-06 ASSESSMENT — PAIN - FUNCTIONAL ASSESSMENT: PAIN_FUNCTIONAL_ASSESSMENT: PREVENTS OR INTERFERES SOME ACTIVE ACTIVITIES AND ADLS

## 2024-09-06 ASSESSMENT — PAIN SCALES - GENERAL: PAINLEVEL_OUTOF10: 0

## 2024-09-06 NOTE — PERIOP NOTE
[address] [address]   MAIN OR                     [phone]    MAIN PRE OP             [phone]                                                                                AMBULATORY PRE OP          [phone]  PRE-ADMISSION TESTING    [phone]     Surgery Date:  09/19/2024       Is surgery arrival time given by surgeon?  YES  NO    If “NO”, [facility] staff will call you between 4 and 7pm the day before your surgery with your arrival time. (If your surgery is on a Monday, we will call you the Friday before.)    Call [phone] after 7pm Monday-Friday if you did not receive this call.    INSTRUCTIONS BEFORE YOUR SURGERY   When You  Arrive Arrive at [facility] Patient Access on 1st floor the day of your surgery.  Have your insurance card, photo ID,living will/advanced directive/POA (if applicable),  and any copayment (if needed)   Food   and   Drink NO solid food after midnight the night before surgery. You can drink clear liquids from midnight until ONE hour prior to your arrival at the hospital on the day of your surgery. Clear liquids include:  Water  CLEAR APPLE JUICE W/O SEDIMENT   Carbonated beverages  Black coffee(no cream/milk)  Tea(no cream/milk)  Gatorade    No alcohol (beer, wine, liquor) or marijuana (smoking) 24 hours, edibles (3 days). Stop smoking cigarettes 14 days before surgery (helps w/healing and breathing).   Medications to   TAKE   Morning of Surgery MEDICATIONS TO TAKE THE MORNING OF SURGERY WITH A SIP OF WATER:   OMEPRAZOLE, LEVOTHYROXINE,     You may take these medications, IF NEEDED, the morning of surgery: TYLENOL OR TRAMADOL: LAST DOSE TO BE 4 HOURS PRIOR TO ARRIVAL, LORAZAPRAM    Ask your surgeon/prescribing doctor for instructions on taking or stopping these medications prior to surgery: NONE     Medications to STOP  before surgery Non-Steroidal anti-inflammatory Drugs (NSAID's): for example,  here before 12 noon unless you are told differently   Special Instructions Free  parking available from 6 AM until 4:30 PM.    FOLLOW ALL INSTRUCTIONS GIVEN BY SURGEONS OFFICE        Preventing Infections Before and After - Your Surgery    IMPORTANT INSTRUCTIONS      You play an important role in your health and preparation for surgery. To reduce the germs on your skin you will need to shower with CHG soap (Chorhexidine gluconate 4%) two times before surgery.    CHG soap (Hibiclens, Hex-A-Clens or store brand)  CHG soap will be provided at your Preadmission Testing (PAT) appointment.  If you do not have a PAT appointment before surgery, you may arrange to  CHG soap from our office or purchase CHG soap at a pharmacy, grocery or department store.  You need to purchase TWO 4 ounce bottles to use for your 2 showers.    Steps to follow:  Wash your hair with your normal shampoo and your body with regular soap and rinse well to remove shampoo and soap from your skin.  Wet a clean washcloth and turn off the shower.  Put CHG soap on washcloth and apply to your entire body from the neck down. Do not use on your head, face or private parts(genitals). Do not use CHG soap on open sores, wounds or areas of skin irritation.  Wash you body gently for 5 minutes. Do not wash your skin too hard. This soap does not create lather. Pay special attention to your underarms and from your belly button to your feet.  Turn the shower back on and rinse well to get CHG soap off your body.  Pat your skin dry with a clean, dry towel. Do not apply lotions or moisturizer.  Put on clean clothes and sleep on fresh bed sheets and do not allow pets to sleep with you.    Shower with CHG soap 2 times before your surgery  The evening before your surgery  The morning of your surgery      Tips to help prevent infections after your surgery:  Protect your surgical wound from germs:  Hand washing is the most important thing you and your caregivers

## 2024-09-06 NOTE — PERIOP NOTE
INSTRUCTIONS GIVEN AND REVIEWED, 2 BOTTLES OF SOAP GIVEN, PT GIVEN TIME TO ASK QUESTIONS     CALLED DR. LAVON CHAVEZ- [phone] SPOKE WITH LAWANDA RAVI LOV NOTES AND EKG FAX NUMBER GIVEN,  RECEIVED LOV NOTES AND EKG PLACED ON CHART     PT GAVE ME A SHEET FROM ANESTHESIA DR. AGGARWAL- FOR PREVENTION FOR NAUSEA PLACED ON CHART, FOR ANESTHESIA TO SEE AND BE AWARE     NO VERSED  TIVA WITH PROPOFAL +O2  NO N2O OR VOLATLLE  DEXAMETHASONE 4 MG IV  ZOFRAN 4MG IV  PHENERGAN 6.25MG SLOW IV     [phone] (TASHA)  [email or web address]    THIS IS WHAT IS ON THE LETTER

## 2024-09-07 LAB
BACTERIA SPEC CULT: NORMAL
BACTERIA SPEC CULT: NORMAL
SERVICE CMNT-IMP: NORMAL

## 2024-09-19 ENCOUNTER — ANESTHESIA (OUTPATIENT)
Facility: HOSPITAL | Age: 84
End: 2024-09-19
Payer: MEDICARE

## 2024-09-19 ENCOUNTER — APPOINTMENT (OUTPATIENT)
Facility: HOSPITAL | Age: 84
End: 2024-09-19
Attending: ORTHOPAEDIC SURGERY
Payer: MEDICARE

## 2024-09-19 ENCOUNTER — HOSPITAL ENCOUNTER (OUTPATIENT)
Facility: HOSPITAL | Age: 84
Setting detail: OBSERVATION
Discharge: HOME HEALTH | End: 2024-09-20
Attending: ORTHOPAEDIC SURGERY | Admitting: ORTHOPAEDIC SURGERY
Payer: MEDICARE

## 2024-09-19 ENCOUNTER — ANESTHESIA EVENT (OUTPATIENT)
Facility: HOSPITAL | Age: 84
End: 2024-09-19
Payer: MEDICARE

## 2024-09-19 DIAGNOSIS — Z96.642 S/P TOTAL LEFT HIP ARTHROPLASTY: Primary | ICD-10-CM

## 2024-09-19 LAB
GLUCOSE BLD STRIP.AUTO-MCNC: 104 MG/DL (ref 65–117)
HBV SURFACE AG SER QL: <0.1 INDEX
HBV SURFACE AG SER QL: NEGATIVE
HCV AB SER IA-ACNC: 0.1 INDEX
HCV AB SERPL QL IA: NONREACTIVE
HIV1 P24 AG SERPL QL IA: NONREACTIVE
HIV1+2 AB SERPL QL IA: NONREACTIVE
SERVICE CMNT-IMP: NORMAL

## 2024-09-19 PROCEDURE — 82962 GLUCOSE BLOOD TEST: CPT

## 2024-09-19 PROCEDURE — APPNB60 APP NON BILLABLE TIME 46-60 MINS: Performed by: NURSE PRACTITIONER

## 2024-09-19 PROCEDURE — 2580000003 HC RX 258: Performed by: ORTHOPAEDIC SURGERY

## 2024-09-19 PROCEDURE — 6360000002 HC RX W HCPCS: Performed by: ANESTHESIOLOGY

## 2024-09-19 PROCEDURE — 97161 PT EVAL LOW COMPLEX 20 MIN: CPT

## 2024-09-19 PROCEDURE — 3600000005 HC SURGERY LEVEL 5 BASE: Performed by: ORTHOPAEDIC SURGERY

## 2024-09-19 PROCEDURE — G0378 HOSPITAL OBSERVATION PER HR: HCPCS

## 2024-09-19 PROCEDURE — 7100000001 HC PACU RECOVERY - ADDTL 15 MIN: Performed by: ORTHOPAEDIC SURGERY

## 2024-09-19 PROCEDURE — 6370000000 HC RX 637 (ALT 250 FOR IP): Performed by: ORTHOPAEDIC SURGERY

## 2024-09-19 PROCEDURE — 6370000000 HC RX 637 (ALT 250 FOR IP): Performed by: NURSE PRACTITIONER

## 2024-09-19 PROCEDURE — 6360000002 HC RX W HCPCS: Performed by: ORTHOPAEDIC SURGERY

## 2024-09-19 PROCEDURE — 3700000001 HC ADD 15 MINUTES (ANESTHESIA): Performed by: ORTHOPAEDIC SURGERY

## 2024-09-19 PROCEDURE — 7100000000 HC PACU RECOVERY - FIRST 15 MIN: Performed by: ORTHOPAEDIC SURGERY

## 2024-09-19 PROCEDURE — 97530 THERAPEUTIC ACTIVITIES: CPT

## 2024-09-19 PROCEDURE — 3600000015 HC SURGERY LEVEL 5 ADDTL 15MIN: Performed by: ORTHOPAEDIC SURGERY

## 2024-09-19 PROCEDURE — 2580000003 HC RX 258: Performed by: ANESTHESIOLOGY

## 2024-09-19 PROCEDURE — C1776 JOINT DEVICE (IMPLANTABLE): HCPCS | Performed by: ORTHOPAEDIC SURGERY

## 2024-09-19 PROCEDURE — 72170 X-RAY EXAM OF PELVIS: CPT

## 2024-09-19 PROCEDURE — 6360000002 HC RX W HCPCS: Performed by: NURSE PRACTITIONER

## 2024-09-19 PROCEDURE — 3700000000 HC ANESTHESIA ATTENDED CARE: Performed by: ORTHOPAEDIC SURGERY

## 2024-09-19 PROCEDURE — 2500000003 HC RX 250 WO HCPCS: Performed by: ORTHOPAEDIC SURGERY

## 2024-09-19 PROCEDURE — 2709999900 HC NON-CHARGEABLE SUPPLY: Performed by: ORTHOPAEDIC SURGERY

## 2024-09-19 PROCEDURE — 36415 COLL VENOUS BLD VENIPUNCTURE: CPT

## 2024-09-19 DEVICE — BIOLOX DELTA CERAMIC FEMORAL HEAD +5.0 36MM DIA 12/14 TAPER
Type: IMPLANTABLE DEVICE | Site: HIP | Status: FUNCTIONAL
Brand: BIOLOX DELTA

## 2024-09-19 DEVICE — HIP H2 TOT ADV OTHER HD IMPL CAPPED SYNTHES: Type: IMPLANTABLE DEVICE | Site: HIP | Status: FUNCTIONAL

## 2024-09-19 DEVICE — EMPHASYS ACETABULAR SHELL THREE-HOLE 50MM CEMENTLESS
Type: IMPLANTABLE DEVICE | Site: HIP | Status: FUNCTIONAL
Brand: EMPHASYS

## 2024-09-19 DEVICE — PINNACLE CANCELLOUS BONE SCREW 6.5MM X 35MM
Type: IMPLANTABLE DEVICE | Site: HIP | Status: FUNCTIONAL
Brand: PINNACLE

## 2024-09-19 DEVICE — ACTIS DUOFIX HIP PROSTHESIS (FEMORAL STEM 12/14 TAPER CEMENTLESS SIZE 7 STD COLLAR)  CE
Type: IMPLANTABLE DEVICE | Site: HIP | Status: FUNCTIONAL
Brand: ACTIS

## 2024-09-19 DEVICE — EMPHASYS POLYETHYLENE LINER AOX NEUTRAL 50MM 36MM
Type: IMPLANTABLE DEVICE | Site: HIP | Status: FUNCTIONAL
Brand: EMPHASYS

## 2024-09-19 RX ORDER — METOPROLOL SUCCINATE 25 MG/1
25 TABLET, EXTENDED RELEASE ORAL EVERY EVENING
Status: DISCONTINUED | OUTPATIENT
Start: 2024-09-19 | End: 2024-09-20 | Stop reason: HOSPADM

## 2024-09-19 RX ORDER — SODIUM CHLORIDE 0.9 % (FLUSH) 0.9 %
5-40 SYRINGE (ML) INJECTION PRN
Status: DISCONTINUED | OUTPATIENT
Start: 2024-09-19 | End: 2024-09-19 | Stop reason: HOSPADM

## 2024-09-19 RX ORDER — SENNA AND DOCUSATE SODIUM 50; 8.6 MG/1; MG/1
1 TABLET, FILM COATED ORAL 2 TIMES DAILY
Status: DISCONTINUED | OUTPATIENT
Start: 2024-09-19 | End: 2024-09-20 | Stop reason: HOSPADM

## 2024-09-19 RX ORDER — LORAZEPAM 0.5 MG/1
0.5 TABLET ORAL EVERY 6 HOURS PRN
Status: DISCONTINUED | OUTPATIENT
Start: 2024-09-19 | End: 2024-09-20 | Stop reason: HOSPADM

## 2024-09-19 RX ORDER — SODIUM CHLORIDE, SODIUM LACTATE, POTASSIUM CHLORIDE, CALCIUM CHLORIDE 600; 310; 30; 20 MG/100ML; MG/100ML; MG/100ML; MG/100ML
INJECTION, SOLUTION INTRAVENOUS CONTINUOUS
Status: DISCONTINUED | OUTPATIENT
Start: 2024-09-19 | End: 2024-09-19 | Stop reason: HOSPADM

## 2024-09-19 RX ORDER — CELECOXIB 200 MG/1
200 CAPSULE ORAL ONCE
Status: COMPLETED | OUTPATIENT
Start: 2024-09-19 | End: 2024-09-19

## 2024-09-19 RX ORDER — DIPHENHYDRAMINE HYDROCHLORIDE 50 MG/ML
12.5 INJECTION INTRAMUSCULAR; INTRAVENOUS
Status: DISCONTINUED | OUTPATIENT
Start: 2024-09-19 | End: 2024-09-19 | Stop reason: HOSPADM

## 2024-09-19 RX ORDER — DEXAMETHASONE SODIUM PHOSPHATE 4 MG/ML
INJECTION, SOLUTION INTRA-ARTICULAR; INTRALESIONAL; INTRAMUSCULAR; INTRAVENOUS; SOFT TISSUE
Status: DISCONTINUED | OUTPATIENT
Start: 2024-09-19 | End: 2024-09-19 | Stop reason: SDUPTHER

## 2024-09-19 RX ORDER — ONDANSETRON 2 MG/ML
INJECTION INTRAMUSCULAR; INTRAVENOUS
Status: DISCONTINUED | OUTPATIENT
Start: 2024-09-19 | End: 2024-09-19 | Stop reason: SDUPTHER

## 2024-09-19 RX ORDER — SODIUM CHLORIDE 0.9 % (FLUSH) 0.9 %
5-40 SYRINGE (ML) INJECTION EVERY 12 HOURS SCHEDULED
Status: DISCONTINUED | OUTPATIENT
Start: 2024-09-19 | End: 2024-09-19 | Stop reason: HOSPADM

## 2024-09-19 RX ORDER — HYDRALAZINE HYDROCHLORIDE 20 MG/ML
10 INJECTION INTRAMUSCULAR; INTRAVENOUS
Status: DISCONTINUED | OUTPATIENT
Start: 2024-09-19 | End: 2024-09-19 | Stop reason: HOSPADM

## 2024-09-19 RX ORDER — LANOLIN ALCOHOL/MO/W.PET/CERES
400 CREAM (GRAM) TOPICAL DAILY
Status: DISCONTINUED | OUTPATIENT
Start: 2024-09-19 | End: 2024-09-20 | Stop reason: HOSPADM

## 2024-09-19 RX ORDER — SODIUM CHLORIDE 9 MG/ML
INJECTION, SOLUTION INTRAVENOUS CONTINUOUS
Status: DISPENSED | OUTPATIENT
Start: 2024-09-19 | End: 2024-09-20

## 2024-09-19 RX ORDER — FENTANYL CITRATE 50 UG/ML
100 INJECTION, SOLUTION INTRAMUSCULAR; INTRAVENOUS
Status: DISCONTINUED | OUTPATIENT
Start: 2024-09-19 | End: 2024-09-19 | Stop reason: HOSPADM

## 2024-09-19 RX ORDER — MEPERIDINE HYDROCHLORIDE 25 MG/ML
12.5 INJECTION INTRAMUSCULAR; INTRAVENOUS; SUBCUTANEOUS EVERY 5 MIN PRN
Status: DISCONTINUED | OUTPATIENT
Start: 2024-09-19 | End: 2024-09-19 | Stop reason: HOSPADM

## 2024-09-19 RX ORDER — OXYCODONE HYDROCHLORIDE 5 MG/1
2.5 TABLET ORAL
Status: DISCONTINUED | OUTPATIENT
Start: 2024-09-19 | End: 2024-09-19

## 2024-09-19 RX ORDER — ONDANSETRON 4 MG/1
4 TABLET, ORALLY DISINTEGRATING ORAL EVERY 8 HOURS PRN
Status: DISCONTINUED | OUTPATIENT
Start: 2024-09-19 | End: 2024-09-20 | Stop reason: HOSPADM

## 2024-09-19 RX ORDER — ACETAMINOPHEN 500 MG
1000 TABLET ORAL ONCE
Status: COMPLETED | OUTPATIENT
Start: 2024-09-19 | End: 2024-09-19

## 2024-09-19 RX ORDER — SODIUM CHLORIDE 0.9 % (FLUSH) 0.9 %
5-40 SYRINGE (ML) INJECTION PRN
Status: DISCONTINUED | OUTPATIENT
Start: 2024-09-19 | End: 2024-09-20 | Stop reason: HOSPADM

## 2024-09-19 RX ORDER — TRAMADOL HYDROCHLORIDE 50 MG/1
50 TABLET ORAL EVERY 6 HOURS PRN
Status: DISCONTINUED | OUTPATIENT
Start: 2024-09-19 | End: 2024-09-20 | Stop reason: HOSPADM

## 2024-09-19 RX ORDER — ONDANSETRON 2 MG/ML
4 INJECTION INTRAMUSCULAR; INTRAVENOUS
Status: COMPLETED | OUTPATIENT
Start: 2024-09-19 | End: 2024-09-19

## 2024-09-19 RX ORDER — HYDROXYZINE HYDROCHLORIDE 10 MG/1
10 TABLET, FILM COATED ORAL EVERY 8 HOURS PRN
Status: DISCONTINUED | OUTPATIENT
Start: 2024-09-19 | End: 2024-09-20 | Stop reason: HOSPADM

## 2024-09-19 RX ORDER — LISINOPRIL 20 MG/1
20 TABLET ORAL DAILY
Status: DISCONTINUED | OUTPATIENT
Start: 2024-09-19 | End: 2024-09-20 | Stop reason: HOSPADM

## 2024-09-19 RX ORDER — SODIUM CHLORIDE 0.9 % (FLUSH) 0.9 %
5-40 SYRINGE (ML) INJECTION EVERY 12 HOURS SCHEDULED
Status: DISCONTINUED | OUTPATIENT
Start: 2024-09-19 | End: 2024-09-20 | Stop reason: HOSPADM

## 2024-09-19 RX ORDER — ASCORBIC ACID 500 MG
1000 TABLET ORAL DAILY
Status: DISCONTINUED | OUTPATIENT
Start: 2024-09-19 | End: 2024-09-20 | Stop reason: HOSPADM

## 2024-09-19 RX ORDER — ONDANSETRON 2 MG/ML
4 INJECTION INTRAMUSCULAR; INTRAVENOUS ONCE
Status: DISCONTINUED | OUTPATIENT
Start: 2024-09-19 | End: 2024-09-19 | Stop reason: HOSPADM

## 2024-09-19 RX ORDER — SODIUM CHLORIDE 9 MG/ML
INJECTION, SOLUTION INTRAVENOUS PRN
Status: DISCONTINUED | OUTPATIENT
Start: 2024-09-19 | End: 2024-09-19 | Stop reason: HOSPADM

## 2024-09-19 RX ORDER — MIDAZOLAM HYDROCHLORIDE 1 MG/ML
INJECTION INTRAMUSCULAR; INTRAVENOUS
Status: DISCONTINUED | OUTPATIENT
Start: 2024-09-19 | End: 2024-09-19 | Stop reason: SDUPTHER

## 2024-09-19 RX ORDER — BISACODYL 10 MG
10 SUPPOSITORY, RECTAL RECTAL DAILY PRN
Status: DISCONTINUED | OUTPATIENT
Start: 2024-09-19 | End: 2024-09-20 | Stop reason: HOSPADM

## 2024-09-19 RX ORDER — SODIUM CHLORIDE 9 MG/ML
INJECTION, SOLUTION INTRAVENOUS CONTINUOUS
Status: DISCONTINUED | OUTPATIENT
Start: 2024-09-19 | End: 2024-09-19 | Stop reason: HOSPADM

## 2024-09-19 RX ORDER — MIDAZOLAM HYDROCHLORIDE 2 MG/2ML
2 INJECTION, SOLUTION INTRAMUSCULAR; INTRAVENOUS
Status: DISCONTINUED | OUTPATIENT
Start: 2024-09-19 | End: 2024-09-19 | Stop reason: HOSPADM

## 2024-09-19 RX ORDER — KETOROLAC TROMETHAMINE 30 MG/ML
15 INJECTION, SOLUTION INTRAMUSCULAR; INTRAVENOUS EVERY 6 HOURS
Status: COMPLETED | OUTPATIENT
Start: 2024-09-19 | End: 2024-09-20

## 2024-09-19 RX ORDER — PANTOPRAZOLE SODIUM 40 MG/1
40 TABLET, DELAYED RELEASE ORAL
Status: DISCONTINUED | OUTPATIENT
Start: 2024-09-20 | End: 2024-09-20 | Stop reason: HOSPADM

## 2024-09-19 RX ORDER — 0.9 % SODIUM CHLORIDE 0.9 %
500 INTRAVENOUS SOLUTION INTRAVENOUS PRN
Status: DISCONTINUED | OUTPATIENT
Start: 2024-09-19 | End: 2024-09-20 | Stop reason: HOSPADM

## 2024-09-19 RX ORDER — PROCHLORPERAZINE EDISYLATE 5 MG/ML
5 INJECTION INTRAMUSCULAR; INTRAVENOUS
Status: DISCONTINUED | OUTPATIENT
Start: 2024-09-19 | End: 2024-09-19 | Stop reason: HOSPADM

## 2024-09-19 RX ORDER — LEVOTHYROXINE SODIUM 112 UG/1
112 TABLET ORAL DAILY
Status: DISCONTINUED | OUTPATIENT
Start: 2024-09-20 | End: 2024-09-20 | Stop reason: HOSPADM

## 2024-09-19 RX ORDER — NALOXONE HYDROCHLORIDE 0.4 MG/ML
INJECTION, SOLUTION INTRAMUSCULAR; INTRAVENOUS; SUBCUTANEOUS PRN
Status: DISCONTINUED | OUTPATIENT
Start: 2024-09-19 | End: 2024-09-19 | Stop reason: HOSPADM

## 2024-09-19 RX ORDER — HYDROCHLOROTHIAZIDE 25 MG/1
25 TABLET ORAL DAILY
Status: DISCONTINUED | OUTPATIENT
Start: 2024-09-19 | End: 2024-09-20 | Stop reason: HOSPADM

## 2024-09-19 RX ORDER — MIDAZOLAM HYDROCHLORIDE 5 MG/5ML
5 INJECTION, SOLUTION INTRAMUSCULAR; INTRAVENOUS
Status: DISCONTINUED | OUTPATIENT
Start: 2024-09-19 | End: 2024-09-19 | Stop reason: HOSPADM

## 2024-09-19 RX ORDER — ASPIRIN 81 MG/1
81 TABLET ORAL 2 TIMES DAILY
Status: DISCONTINUED | OUTPATIENT
Start: 2024-09-19 | End: 2024-09-20 | Stop reason: HOSPADM

## 2024-09-19 RX ORDER — SODIUM CHLORIDE 9 MG/ML
INJECTION, SOLUTION INTRAVENOUS PRN
Status: DISCONTINUED | OUTPATIENT
Start: 2024-09-19 | End: 2024-09-20 | Stop reason: HOSPADM

## 2024-09-19 RX ORDER — ENALAPRIL MALEATE 10 MG/1
20 TABLET ORAL DAILY
Status: DISCONTINUED | OUTPATIENT
Start: 2024-09-19 | End: 2024-09-19 | Stop reason: CLARIF

## 2024-09-19 RX ORDER — OXYCODONE HYDROCHLORIDE 5 MG/1
5 TABLET ORAL
Status: DISCONTINUED | OUTPATIENT
Start: 2024-09-19 | End: 2024-09-19

## 2024-09-19 RX ORDER — KETOROLAC TROMETHAMINE 30 MG/ML
15 INJECTION, SOLUTION INTRAMUSCULAR; INTRAVENOUS EVERY 6 HOURS
Status: DISCONTINUED | OUTPATIENT
Start: 2024-09-19 | End: 2024-09-19

## 2024-09-19 RX ORDER — ACETAMINOPHEN 500 MG
500 TABLET ORAL
Status: DISCONTINUED | OUTPATIENT
Start: 2024-09-19 | End: 2024-09-20 | Stop reason: HOSPADM

## 2024-09-19 RX ORDER — ONDANSETRON 2 MG/ML
4 INJECTION INTRAMUSCULAR; INTRAVENOUS EVERY 6 HOURS PRN
Status: DISCONTINUED | OUTPATIENT
Start: 2024-09-19 | End: 2024-09-20 | Stop reason: HOSPADM

## 2024-09-19 RX ORDER — VITAMIN B COMPLEX
1000 TABLET ORAL DAILY
Status: DISCONTINUED | OUTPATIENT
Start: 2024-09-19 | End: 2024-09-20 | Stop reason: HOSPADM

## 2024-09-19 RX ORDER — PROPOFOL 10 MG/ML
INJECTION, EMULSION INTRAVENOUS
Status: DISCONTINUED | OUTPATIENT
Start: 2024-09-19 | End: 2024-09-19 | Stop reason: SDUPTHER

## 2024-09-19 RX ORDER — FENTANYL CITRATE 50 UG/ML
50 INJECTION, SOLUTION INTRAMUSCULAR; INTRAVENOUS EVERY 5 MIN PRN
Status: DISCONTINUED | OUTPATIENT
Start: 2024-09-19 | End: 2024-09-19 | Stop reason: HOSPADM

## 2024-09-19 RX ORDER — POLYETHYLENE GLYCOL 3350 17 G/17G
17 POWDER, FOR SOLUTION ORAL DAILY PRN
Status: DISCONTINUED | OUTPATIENT
Start: 2024-09-19 | End: 2024-09-20 | Stop reason: HOSPADM

## 2024-09-19 RX ADMIN — WATER 2000 MG: 1 INJECTION INTRAMUSCULAR; INTRAVENOUS; SUBCUTANEOUS at 16:38

## 2024-09-19 RX ADMIN — TRAMADOL HYDROCHLORIDE 50 MG: 50 TABLET ORAL at 15:13

## 2024-09-19 RX ADMIN — ACETAMINOPHEN 500 MG: 500 TABLET ORAL at 18:15

## 2024-09-19 RX ADMIN — MIDAZOLAM 2 MG: 1 INJECTION INTRAMUSCULAR; INTRAVENOUS at 08:01

## 2024-09-19 RX ADMIN — CELECOXIB 200 MG: 200 CAPSULE ORAL at 07:36

## 2024-09-19 RX ADMIN — ONDANSETRON 4 MG: 2 INJECTION INTRAMUSCULAR; INTRAVENOUS at 10:48

## 2024-09-19 RX ADMIN — Medication 1000 UNITS: at 15:13

## 2024-09-19 RX ADMIN — PROPOFOL 100 MG: 10 INJECTION, EMULSION INTRAVENOUS at 09:59

## 2024-09-19 RX ADMIN — SENNOSIDES AND DOCUSATE SODIUM 1 TABLET: 50; 8.6 TABLET ORAL at 22:23

## 2024-09-19 RX ADMIN — ASPIRIN 81 MG: 81 TABLET, COATED ORAL at 15:13

## 2024-09-19 RX ADMIN — ONDANSETRON 4 MG: 2 INJECTION INTRAMUSCULAR; INTRAVENOUS at 08:28

## 2024-09-19 RX ADMIN — SODIUM CHLORIDE, PRESERVATIVE FREE 10 ML: 5 INJECTION INTRAVENOUS at 22:45

## 2024-09-19 RX ADMIN — TRANEXAMIC ACID 1000 MG: 100 INJECTION, SOLUTION INTRAVENOUS at 08:11

## 2024-09-19 RX ADMIN — KETOROLAC TROMETHAMINE 15 MG: 30 INJECTION, SOLUTION INTRAMUSCULAR at 22:24

## 2024-09-19 RX ADMIN — METOPROLOL SUCCINATE 25 MG: 25 TABLET, EXTENDED RELEASE ORAL at 18:15

## 2024-09-19 RX ADMIN — SODIUM CHLORIDE: 9 INJECTION, SOLUTION INTRAVENOUS at 11:11

## 2024-09-19 RX ADMIN — SODIUM CHLORIDE, POTASSIUM CHLORIDE, SODIUM LACTATE AND CALCIUM CHLORIDE: 600; 310; 30; 20 INJECTION, SOLUTION INTRAVENOUS at 06:40

## 2024-09-19 RX ADMIN — WATER 2000 MG: 1 INJECTION INTRAMUSCULAR; INTRAVENOUS; SUBCUTANEOUS at 08:17

## 2024-09-19 RX ADMIN — FENTANYL CITRATE 50 MCG: 50 INJECTION INTRAMUSCULAR; INTRAVENOUS at 10:38

## 2024-09-19 RX ADMIN — TRAMADOL HYDROCHLORIDE 50 MG: 50 TABLET ORAL at 22:23

## 2024-09-19 RX ADMIN — FOLIC ACID TAB 400 MCG 400 MCG: 400 TAB at 15:13

## 2024-09-19 RX ADMIN — PROPOFOL 150 MG: 10 INJECTION, EMULSION INTRAVENOUS at 08:31

## 2024-09-19 RX ADMIN — PROPOFOL 50 MG: 10 INJECTION, EMULSION INTRAVENOUS at 08:55

## 2024-09-19 RX ADMIN — PHENYLEPHRINE HYDROCHLORIDE 40 MCG/MIN: 10 INJECTION INTRAVENOUS at 08:09

## 2024-09-19 RX ADMIN — PROPOFOL 4 MCG/KG/MIN: 10 INJECTION, EMULSION INTRAVENOUS at 08:03

## 2024-09-19 RX ADMIN — DEXAMETHASONE SODIUM PHOSPHATE 4 MG: 4 INJECTION INTRA-ARTICULAR; INTRALESIONAL; INTRAMUSCULAR; INTRAVENOUS; SOFT TISSUE at 08:28

## 2024-09-19 RX ADMIN — ACETAMINOPHEN 500 MG: 500 TABLET ORAL at 22:23

## 2024-09-19 RX ADMIN — HYDROMORPHONE HYDROCHLORIDE 0.5 MG: 1 INJECTION, SOLUTION INTRAMUSCULAR; INTRAVENOUS; SUBCUTANEOUS at 10:37

## 2024-09-19 RX ADMIN — OXYCODONE HYDROCHLORIDE AND ACETAMINOPHEN 1000 MG: 500 TABLET ORAL at 15:13

## 2024-09-19 RX ADMIN — ACETAMINOPHEN 500 MG: 500 TABLET ORAL at 15:12

## 2024-09-19 RX ADMIN — SENNOSIDES AND DOCUSATE SODIUM 1 TABLET: 50; 8.6 TABLET ORAL at 15:13

## 2024-09-19 RX ADMIN — ACETAMINOPHEN 1000 MG: 500 TABLET ORAL at 07:36

## 2024-09-19 RX ADMIN — MEPIVACAINE HYDROCHLORIDE 50 MG: 15 INJECTION, SOLUTION EPIDURAL; INFILTRATION at 08:07

## 2024-09-19 ASSESSMENT — PAIN - FUNCTIONAL ASSESSMENT: PAIN_FUNCTIONAL_ASSESSMENT: 0-10

## 2024-09-19 ASSESSMENT — PAIN SCALES - GENERAL
PAINLEVEL_OUTOF10: 6
PAINLEVEL_OUTOF10: 3
PAINLEVEL_OUTOF10: 6
PAINLEVEL_OUTOF10: 2

## 2024-09-19 ASSESSMENT — PAIN DESCRIPTION - LOCATION
LOCATION: LEG
LOCATION: LEG
LOCATION: HIP;LEG
LOCATION: GROIN;HIP;BACK

## 2024-09-19 ASSESSMENT — PAIN DESCRIPTION - ORIENTATION
ORIENTATION: RIGHT

## 2024-09-20 VITALS
DIASTOLIC BLOOD PRESSURE: 73 MMHG | SYSTOLIC BLOOD PRESSURE: 119 MMHG | WEIGHT: 139 LBS | OXYGEN SATURATION: 98 % | HEIGHT: 60 IN | HEART RATE: 76 BPM | RESPIRATION RATE: 18 BRPM | TEMPERATURE: 97.7 F | BODY MASS INDEX: 27.29 KG/M2

## 2024-09-20 LAB
ANION GAP SERPL CALC-SCNC: 8 MMOL/L (ref 2–12)
BUN SERPL-MCNC: 25 MG/DL (ref 6–20)
BUN/CREAT SERPL: 20 (ref 12–20)
CALCIUM SERPL-MCNC: 8.1 MG/DL (ref 8.5–10.1)
CHLORIDE SERPL-SCNC: 108 MMOL/L (ref 97–108)
CO2 SERPL-SCNC: 23 MMOL/L (ref 21–32)
CREAT SERPL-MCNC: 1.23 MG/DL (ref 0.55–1.02)
GLUCOSE BLD STRIP.AUTO-MCNC: 111 MG/DL (ref 65–117)
GLUCOSE BLD STRIP.AUTO-MCNC: 114 MG/DL (ref 65–117)
GLUCOSE SERPL-MCNC: 102 MG/DL (ref 65–100)
HCT VFR BLD AUTO: 25.1 % (ref 35–47)
HGB BLD-MCNC: 8.6 G/DL (ref 11.5–16)
POTASSIUM SERPL-SCNC: 3.4 MMOL/L (ref 3.5–5.1)
SERVICE CMNT-IMP: NORMAL
SERVICE CMNT-IMP: NORMAL
SODIUM SERPL-SCNC: 139 MMOL/L (ref 136–145)

## 2024-09-20 PROCEDURE — 80048 BASIC METABOLIC PNL TOTAL CA: CPT

## 2024-09-20 PROCEDURE — G0378 HOSPITAL OBSERVATION PER HR: HCPCS

## 2024-09-20 PROCEDURE — 6360000002 HC RX W HCPCS: Performed by: ORTHOPAEDIC SURGERY

## 2024-09-20 PROCEDURE — 97165 OT EVAL LOW COMPLEX 30 MIN: CPT

## 2024-09-20 PROCEDURE — 97535 SELF CARE MNGMENT TRAINING: CPT

## 2024-09-20 PROCEDURE — 85014 HEMATOCRIT: CPT

## 2024-09-20 PROCEDURE — 0054T BONE SRGRY CMPTR FLUOR IMAGE: CPT | Performed by: ORTHOPAEDIC SURGERY

## 2024-09-20 PROCEDURE — 27130 TOTAL HIP ARTHROPLASTY: CPT | Performed by: ORTHOPAEDIC SURGERY

## 2024-09-20 PROCEDURE — 6370000000 HC RX 637 (ALT 250 FOR IP): Performed by: NURSE PRACTITIONER

## 2024-09-20 PROCEDURE — 36415 COLL VENOUS BLD VENIPUNCTURE: CPT

## 2024-09-20 PROCEDURE — 85018 HEMOGLOBIN: CPT

## 2024-09-20 PROCEDURE — 2580000003 HC RX 258: Performed by: ORTHOPAEDIC SURGERY

## 2024-09-20 PROCEDURE — 97530 THERAPEUTIC ACTIVITIES: CPT

## 2024-09-20 PROCEDURE — 82962 GLUCOSE BLOOD TEST: CPT

## 2024-09-20 PROCEDURE — 6370000000 HC RX 637 (ALT 250 FOR IP): Performed by: ORTHOPAEDIC SURGERY

## 2024-09-20 PROCEDURE — 6360000002 HC RX W HCPCS: Performed by: NURSE PRACTITIONER

## 2024-09-20 PROCEDURE — 96374 THER/PROPH/DIAG INJ IV PUSH: CPT

## 2024-09-20 RX ORDER — SENNA AND DOCUSATE SODIUM 50; 8.6 MG/1; MG/1
1 TABLET, FILM COATED ORAL 2 TIMES DAILY
Qty: 60 TABLET | Refills: 0 | Status: SHIPPED | OUTPATIENT
Start: 2024-09-20

## 2024-09-20 RX ORDER — ASPIRIN 81 MG/1
81 TABLET ORAL 2 TIMES DAILY
Qty: 60 TABLET | Refills: 0 | Status: SHIPPED | OUTPATIENT
Start: 2024-09-20 | End: 2024-10-20

## 2024-09-20 RX ORDER — TRAMADOL HYDROCHLORIDE 50 MG/1
50 TABLET ORAL EVERY 6 HOURS PRN
Qty: 28 TABLET | Refills: 0 | Status: SHIPPED | OUTPATIENT
Start: 2024-09-20 | End: 2024-09-27

## 2024-09-20 RX ADMIN — KETOROLAC TROMETHAMINE 15 MG: 30 INJECTION, SOLUTION INTRAMUSCULAR at 03:09

## 2024-09-20 RX ADMIN — WATER 2000 MG: 1 INJECTION INTRAMUSCULAR; INTRAVENOUS; SUBCUTANEOUS at 00:40

## 2024-09-20 RX ADMIN — OXYCODONE HYDROCHLORIDE AND ACETAMINOPHEN 1000 MG: 500 TABLET ORAL at 10:10

## 2024-09-20 RX ADMIN — LEVOTHYROXINE SODIUM 112 MCG: 0.11 TABLET ORAL at 07:07

## 2024-09-20 RX ADMIN — Medication 1000 UNITS: at 10:10

## 2024-09-20 RX ADMIN — LORAZEPAM 0.5 MG: 0.5 TABLET ORAL at 03:10

## 2024-09-20 RX ADMIN — TRAMADOL HYDROCHLORIDE 50 MG: 50 TABLET ORAL at 09:57

## 2024-09-20 RX ADMIN — PANTOPRAZOLE SODIUM 40 MG: 40 TABLET, DELAYED RELEASE ORAL at 07:07

## 2024-09-20 RX ADMIN — ACETAMINOPHEN 500 MG: 500 TABLET ORAL at 10:02

## 2024-09-20 RX ADMIN — ASPIRIN 81 MG: 81 TABLET, COATED ORAL at 10:02

## 2024-09-20 RX ADMIN — ACETAMINOPHEN 500 MG: 500 TABLET ORAL at 07:07

## 2024-09-20 RX ADMIN — FOLIC ACID TAB 400 MCG 400 MCG: 400 TAB at 10:11

## 2024-09-20 RX ADMIN — SENNOSIDES AND DOCUSATE SODIUM 1 TABLET: 50; 8.6 TABLET ORAL at 10:01

## 2024-09-20 ASSESSMENT — PAIN DESCRIPTION - DESCRIPTORS
DESCRIPTORS: SHARP;GNAWING
DESCRIPTORS: ACHING
DESCRIPTORS: ACHING

## 2024-09-20 ASSESSMENT — PAIN - FUNCTIONAL ASSESSMENT
PAIN_FUNCTIONAL_ASSESSMENT: PREVENTS OR INTERFERES SOME ACTIVE ACTIVITIES AND ADLS
PAIN_FUNCTIONAL_ASSESSMENT: PREVENTS OR INTERFERES SOME ACTIVE ACTIVITIES AND ADLS

## 2024-09-20 ASSESSMENT — PAIN DESCRIPTION - ORIENTATION
ORIENTATION: RIGHT

## 2024-09-20 ASSESSMENT — PAIN SCALES - GENERAL
PAINLEVEL_OUTOF10: 3
PAINLEVEL_OUTOF10: 10
PAINLEVEL_OUTOF10: 4
PAINLEVEL_OUTOF10: 4

## 2024-09-20 ASSESSMENT — PAIN DESCRIPTION - LOCATION
LOCATION: HIP

## 2024-09-20 ASSESSMENT — PAIN DESCRIPTION - FREQUENCY: FREQUENCY: CONTINUOUS

## 2025-07-16 NOTE — OP NOTES
Phill Lerma MD - Adult Reconstruction and Total Joint Replacement      Kristin Douglas - MRN [id number] - : 1940 ([de-identified] y.o.)    Date: 2023    PREOPERATIVE DIAGNOSIS:  Right medial knee degenerative joint disease    POSTOPERATIVE DIAGNOSIS:  Same    PROCEDURE:  Right medial unicompartmental knee replacement    Implants Used:   [employer] UKA  Femur: 4  Tibia: 4  Poly: 9mm  Implant Name Type Inv. Item Serial No.  Lot No. LRB No. Used Action   CEMENT BNE SIMPLEX W/GENT -- 10/PK - SNA  CEMENT BNE SIMPLEX W/GENT -- 10/PK NA [employer] [id number] Right 1 Implanted   COMPONENT FEM 4 RT MEDL - SNA  COMPONENT FEM 4 RT MEDL NA [employer] [id number] Right 1 Implanted   STEM TIB ANCHR 3-4 - SNA  STEM TIB ANCHR 3-4 NA [employer] [id number] Right 1 Implanted   COMPONENT TIB TY 4 RT MEDL AFFINIUM 3 DIM COAT - SNA  COMPONENT TIB TY 4 RT MEDL AFFINIUM 3 DIM COAT NA [employer] [id number] Right 1 Implanted   INSERT TIB 4 9 MM RT MEDL - SNA  INSERT TIB 4 9 MM RT MEDL NA [employer] [id number] Right 1 Implanted       Anesthesia: GETA + block    Pre-operative antibiotic: Ancef    Surgeon: Phill Lerma     Assist: KIM Phelps (Performing all or most of the following assistant-at-surgery services including but not limited to: proper patient positioning, sterile/prep and draping, placement of instruments/trackers, operative exposure, minor portions of bone / soft tissue excision, final irrigation and debridement, deep and superficial closure, application of final dressings)    EBL: minimal    Drains: none     Specimens: none     Complications: none     Condition: stable to PACU     INDICATIONS: Longstanding knee pain unresponsive to conservative measures. The risks, benefits, and alternatives to the procedure were explained to the patient and they wished to proceed. They understood no guarantees could be given about the outcome of the procedure.     DESCRIPTION OF PROCEDURE:  The patient was brought into the operating room and placed supine on a standard OR table. Anesthesia was provided by the anesthesia team without difficulty. A thigh tourniquet was applied to the operative limb which was then prepped and draped in the usual fashion. Pre-operative antibiotics were administered. An appropriate time-out was performed. The leg was exsanguinated and the tourniquet inflated. We made a small medial parapatellar approach. A portion of the fat pad and medial meniscus were excised. We thoroughly examined the entire knee. The patient was felt to be a good candidate for unicompartmental knee replacement and we elected to proceed. Selective denervation of the patella was performed. We began by placing the tibial cutting jig. This was pinned into place and the medial tibial resection performed. We then checked our resection gap and pinned the distal femoral resection guide. The distal femoral resection was performed and gaps checked. We then sized the femur and placed the finishing guide with the appropriate orientation. Posterior and chamfer cuts were made and lugs drilled. We sized the tibia and placed a trial. We drilled for the lugs of the tibial tray and placed trial implants. Additional cleanup was performed. The tibial tray was inserted and compressed before inserting the keel. The femoral component was then fully seated. We trialed once more and selected our final polyethylene insert. We then snapped the final spacer in to place. The knee was again taken through range of motion and found to be well balanced with good range of motion and no impingement. We thoroughly lavaged and closed in routine fashion. Periarticular injection was performed. A sterile compressive dressing was applied. The patient was awakened, moved to the stretcher, and taken to the recovery room in stable condition. At the conclusion of the procedure, all counts were correct.  There were no immediate complications. [Routine Follow-Up] : routine follow-up visit for [Other: ___] : [unfilled]

## 2025-08-22 ENCOUNTER — TRANSCRIBE ORDERS (OUTPATIENT)
Facility: HOSPITAL | Age: 85
End: 2025-08-22

## 2025-08-22 DIAGNOSIS — Z12.31 OTHER SCREENING MAMMOGRAM: Primary | ICD-10-CM

## (undated) DEVICE — PADDING CAST W6INXL4YD NONSTERILE COT RAYON MICROPLEATED

## (undated) DEVICE — DRAPE,REIN 53X77,STERILE: Brand: MEDLINE

## (undated) DEVICE — SOLUTION IRRIG 3000ML 0.9% SOD CHL USP UROMATIC PLAS CONT

## (undated) DEVICE — HYPODERMIC SAFETY NEEDLE: Brand: MAGELLAN

## (undated) DEVICE — SNAP KOVER: Brand: UNBRANDED

## (undated) DEVICE — SOLUTION SURG PREP 26 CC PURPREP

## (undated) DEVICE — 3M™ IOBAN™ 2 ANTIMICROBIAL INCISE DRAPE 6651EZ: Brand: IOBAN™ 2

## (undated) DEVICE — SUTURE ABSORBABLE MONOFILAMENT 1 CTX 36 CM 48 MM VIO PDS +

## (undated) DEVICE — INSTRUMENT BATTERY

## (undated) DEVICE — CONTAINER,SPECIMEN,3OZ,OR STRL: Brand: MEDLINE

## (undated) DEVICE — STRIP SKIN CLSR W1XL5IN NYLON REINF CURAD STERIL

## (undated) DEVICE — SUTURE MONOCRYL SZ 3-0 L27IN ABSRB UD PS-2 3/8 CIR REV CUT NDL MCP427H

## (undated) DEVICE — YANKAUER,FLEXIBLE HANDLE,REGLR CAPACITY: Brand: MEDLINE INDUSTRIES, INC.

## (undated) DEVICE — TRANSFER SET 3": Brand: MEDLINE INDUSTRIES, INC.

## (undated) DEVICE — BANDAGE COMPR M W6INXL10YD WHT BGE VELC E MTRX HK AND LOOP

## (undated) DEVICE — SUTURE VICRYL COATED ABSORBABLE BRAIDED 2-0 TP1 54 IN COAT UD VICRYL + VCP880T

## (undated) DEVICE — SOLUTION IRRIG 1000ML STRL H2O USP PLAS POUR BTL

## (undated) DEVICE — TOTAL JOINT-MRMC: Brand: MEDLINE INDUSTRIES, INC.

## (undated) DEVICE — SUTURE VCRL SZ 0 L36IN ABSRB UD L36MM CT-1 1/2 CIR J946H

## (undated) DEVICE — STERILE POLYISOPRENE POWDER-FREE SURGICAL GLOVES: Brand: PROTEXIS

## (undated) DEVICE — STRYKER PERFORMANCE SERIES SAGITTAL BLADE: Brand: STRYKER PERFORMANCE SERIES

## (undated) DEVICE — ELECTRODE BLDE L4IN NONINSULATED EDGE

## (undated) DEVICE — ALCOHOL RUBBING ISO 16OZ 70%

## (undated) DEVICE — DRESSING WND ISLAND STD 4X10 IN MULT LAYR STRL SILVERLON

## (undated) DEVICE — SPONGE GZ W4XL4IN COT 12 PLY TYP VII WVN C FLD DSGN STERILE

## (undated) DEVICE — GLOVE ORTHO 8   MSG9480

## (undated) DEVICE — ZIPPERED TOGA, 2X LARGE: Brand: FLYTE, SURGICOOL

## (undated) DEVICE — SUTURE STRATAFIX SYMMETRIC SZ 1 L18IN ABSRB VLT CT1 L36CM SXPP1A404

## (undated) DEVICE — ELECTRODE PT RET AD L9FT HI MOIST COND ADH HYDRGEL CORDED

## (undated) DEVICE — NDL SPNE QNCKE 18GX3.5IN LF --

## (undated) DEVICE — RECIPROCATING BLADE HEAVY DUTY, OFFSET  (77.5 X 1.23 X 11.0MM)

## (undated) DEVICE — PIN EXT FIX SCHNZ 3 MM

## (undated) DEVICE — SUTURE ABSORBABLE BRAIDED 2-0 CT-1 27 IN UD VICRYL J259H

## (undated) DEVICE — SUTURE STRATAFIX SZ 3-0 30CM NONABSORB UD 26MM FS 3/8 SXMP2B412

## (undated) DEVICE — GLOVE SURG SZ 8 L12IN FNGR THK79MIL GRN LTX FREE

## (undated) DEVICE — CEMENT MIXING SYSTEM WITH FEMORAL BREAKWAY NOZZLE: Brand: REVOLUTION

## (undated) DEVICE — SUT VCRL 1 36IN CT1 UD --

## (undated) DEVICE — SUTURE ABSRB L30CM 2-0 VLT SPRL PDS + STRATAFIX SXPP1B410

## (undated) DEVICE — HOOD, PEEL-AWAY: Brand: FLYTE

## (undated) DEVICE — ZIPPERED TOGA, LARGE: Brand: FLYTE

## (undated) DEVICE — 3M™ IOBAN™ 2 ANTIMICROBIAL INCISE DRAPE 6650EZ: Brand: IOBAN™ 2

## (undated) DEVICE — ZIMMER® STERILE DISPOSABLE TOURNIQUET CUFF WITH PROTECTIVE SLEEVE AND PLC, DUAL PORT, SINGLE BLADDER, 34 IN. (86 CM)

## (undated) DEVICE — Device: Brand: JELCO

## (undated) DEVICE — HANDPIECE SET WITH COAXIAL HIGH FLOW TIP AND SUCTION TUBE: Brand: INTERPULSE

## (undated) DEVICE — TOTAL JOINT - SMH: Brand: MEDLINE INDUSTRIES, INC.

## (undated) DEVICE — 6619 2 PTNT ISO SYS INCISE AREA&LT;(&GT;&&LT;)&GT;P: Brand: STERI-DRAPE™ IOBAN™ 2

## (undated) DEVICE — SYR 20ML LL STRL LF --

## (undated) DEVICE — APPLICATOR MEDICATED 26 CC SOLUTION HI LT ORNG CHLORAPREP

## (undated) DEVICE — SCRUBIN SCRUB BRUSH DRY STER: Brand: MEDLINE INDUSTRIES, INC.

## (undated) DEVICE — DRAPE,U/ SHT,SPLIT,PLAS,STERIL: Brand: MEDLINE

## (undated) DEVICE — 4-PORT MANIFOLD: Brand: NEPTUNE 2

## (undated) DEVICE — DRESSING ANTIMIC FOAM MEPILEX BORD POSTOP AG PROD SZ 4X12 IN

## (undated) DEVICE — SYRINGE 20ML LL S/C 50

## (undated) DEVICE — SOLUTION IRRIG 1000ML 0.9% SOD CHL USP POUR PLAS BTL

## (undated) DEVICE — DRAPE,ORTHOMAX,EXTREMITY: Brand: MEDLINE

## (undated) DEVICE — SEALANT HEMOSTAT W/THROM 8ML -- SURGIFLO MATRIX

## (undated) DEVICE — GLOVE SURG SZ 65 L12IN FNGR THK94MIL STD WHT LTX FREE

## (undated) DEVICE — SUT STRATA PDS 30CM SZ 2-0 CT1 -- STRATAFIX SPIRAL 36MM PDS

## (undated) DEVICE — DECANTER BAG 9": Brand: MEDLINE INDUSTRIES, INC.

## (undated) DEVICE — TUBING, SUCTION, 9/32" X 12', STRAIGHT: Brand: MEDLINE INDUSTRIES, INC.

## (undated) DEVICE — PREP SKN CHLRAPRP APL 26ML STR --

## (undated) DEVICE — PREP KIT PEEL PTCH POVIDONE IOD

## (undated) DEVICE — SYSTEM SKIN CLSR 22CM DERMBND PRINEO

## (undated) DEVICE — MARKER,SKIN,WI/RULER AND LABELS: Brand: MEDLINE

## (undated) DEVICE — OSCILLATING TIP SAW CARTRIDGE: Brand: PRECISION FALCON

## (undated) DEVICE — YANKAUER,SMOOTH HANDLE,HIGH CAPACITY: Brand: MEDLINE INDUSTRIES, INC.

## (undated) DEVICE — HANDPIECE SET WITH BONE CLEANING TIP AND SUCTION TUBE: Brand: INTERPULSE

## (undated) DEVICE — GLOVE SURG SZ 65 L12IN FNGR THK79MIL GRN LTX FREE

## (undated) DEVICE — SUTURE STRATAFIX SPRL SZ 1 L14IN ABSRB VLT L48CM CTX 1/2 SXPD2B405

## (undated) DEVICE — C-ARM: Brand: UNBRANDED

## (undated) DEVICE — STERILE POLYISOPRENE POWDER-FREE SURGICAL GLOVES WITH EMOLLIENT COATING: Brand: PROTEXIS

## (undated) DEVICE — SUTURE VICRYL 1 L27IN ABSRB CT BRAID COAT UD J281H

## (undated) DEVICE — LIQUIBAND RAPID ADHESIVE 36/CS 0.8ML: Brand: MEDLINE

## (undated) DEVICE — DRESSING HYDROCOLLOID BORDER 35X10 IN ALUM PRIMASEAL

## (undated) DEVICE — YANKAUER,OPEN TIP,W/O VENT,STERILE: Brand: MEDLINE INDUSTRIES, INC.